# Patient Record
Sex: MALE | Race: WHITE | NOT HISPANIC OR LATINO | Employment: OTHER | URBAN - METROPOLITAN AREA
[De-identification: names, ages, dates, MRNs, and addresses within clinical notes are randomized per-mention and may not be internally consistent; named-entity substitution may affect disease eponyms.]

---

## 2017-10-11 ENCOUNTER — HOSPITAL ENCOUNTER (OUTPATIENT)
Dept: LAB | Facility: HOSPITAL | Age: 82
Discharge: HOME | End: 2017-10-11
Attending: INTERNAL MEDICINE | Admitting: INTERNAL MEDICINE

## 2017-10-11 LAB
ALBUMIN SERPL-MCNC: 3.4 G/DL (ref 3.5–4.8)
ALBUMIN/GLOB SERPL: 1.2 {RATIO} (ref 1–1.7)
ALP SERPL-CCNC: 46 IU/L (ref 32–91)
ALT SERPL-CCNC: 19 IU/L (ref 17–63)
ANION GAP SERPL CALC-SCNC: 10.3 MMOL/L (ref 10–20)
AST SERPL-CCNC: 23 IU/L (ref 15–41)
BASOPHILS # BLD AUTO: 0 10*3/UL (ref 0–0.2)
BASOPHILS NFR BLD AUTO: 1 % (ref 0–2)
BILIRUB SERPL-MCNC: 1 MG/DL (ref 0.3–1.2)
BUN SERPL-MCNC: 16 MG/DL (ref 8–20)
BUN/CREAT SERPL: 17.8 (ref 6.2–20.3)
CALCIUM SERPL-MCNC: 9 MG/DL (ref 8.9–10.3)
CHLORIDE SERPL-SCNC: 109 MMOL/L (ref 101–111)
CHOLEST SERPL-MCNC: 158 MG/DL
CHOLEST/HDLC SERPL: 5.1 {RATIO}
CONV CO2: 23 MMOL/L (ref 22–32)
CONV LDL CHOLESTEROL DIRECT: 104 MG/DL (ref 0–100)
CONV TOTAL PROTEIN: 6.3 G/DL (ref 6.1–7.9)
CREAT UR-MCNC: 0.9 MG/DL (ref 0.7–1.2)
DIFFERENTIAL METHOD BLD: (no result)
EOSINOPHIL # BLD AUTO: 0.1 10*3/UL (ref 0–0.3)
EOSINOPHIL # BLD AUTO: 2 % (ref 0–3)
ERYTHROCYTE [DISTWIDTH] IN BLOOD BY AUTOMATED COUNT: 14.4 % (ref 11.5–14.5)
FERRITIN SERPL-MCNC: 614 NG/ML (ref 24–336)
GLOBULIN UR ELPH-MCNC: 2.9 G/DL (ref 2.5–3.8)
GLUCOSE SERPL-MCNC: 109 MG/DL (ref 65–99)
HCT VFR BLD AUTO: 46.1 % (ref 40–54)
HDLC SERPL-MCNC: 31 MG/DL
HGB BLD-MCNC: 15.7 G/DL (ref 14–18)
IRON SERPL-MCNC: 122 UG/DL (ref 45–182)
LDLC/HDLC SERPL: 3.4 {RATIO}
LIPID INTERPRETATION: ABNORMAL
LYMPHOCYTES # BLD AUTO: 1.6 10*3/UL (ref 0.8–4.8)
LYMPHOCYTES NFR BLD AUTO: 29 % (ref 18–42)
MCH RBC QN AUTO: 34 PG (ref 26–32)
MCHC RBC AUTO-ENTMCNC: 34.1 G/DL (ref 32–36)
MCV RBC AUTO: 99.7 FL (ref 80–94)
MONOCYTES # BLD AUTO: 0.7 10*3/UL (ref 0.1–1.3)
MONOCYTES NFR BLD AUTO: 13 % (ref 2–11)
NEUTROPHILS # BLD AUTO: 3.1 10*3/UL (ref 2.3–8.6)
NEUTROPHILS NFR BLD AUTO: 55 % (ref 50–75)
NRBC BLD AUTO-RTO: 0 /100{WBCS}
NRBC/RBC NFR BLD MANUAL: 0 10*3/UL
PLATELET # BLD AUTO: 155 10*3/UL (ref 150–450)
PMV BLD AUTO: 8.9 FL (ref 7.4–10.4)
POTASSIUM SERPL-SCNC: 4.3 MMOL/L (ref 3.6–5.1)
RBC # BLD AUTO: 4.63 10*6/UL (ref 4.6–6)
SODIUM SERPL-SCNC: 138 MMOL/L (ref 136–144)
TRIGL SERPL-MCNC: 107 MG/DL
VIT B12 SERPL-MCNC: 689 PG/ML (ref 180–914)
VLDLC SERPL CALC-MCNC: 23 MG/DL
WBC # BLD AUTO: 5.6 10*3/UL (ref 4.5–11.5)

## 2018-10-01 ENCOUNTER — HOSPITAL ENCOUNTER (OUTPATIENT)
Dept: LAB | Facility: HOSPITAL | Age: 83
Setting detail: SPECIMEN
Discharge: HOME | End: 2018-10-01
Attending: INTERNAL MEDICINE | Admitting: INTERNAL MEDICINE

## 2018-10-01 LAB
BASOPHILS # BLD AUTO: 0 10*3/UL (ref 0–0.2)
BASOPHILS NFR BLD AUTO: 1 % (ref 0–2)
DIFFERENTIAL METHOD BLD: (no result)
EOSINOPHIL # BLD AUTO: 0.1 10*3/UL (ref 0–0.3)
EOSINOPHIL # BLD AUTO: 1 % (ref 0–3)
ERYTHROCYTE [DISTWIDTH] IN BLOOD BY AUTOMATED COUNT: 14.5 % (ref 11.5–14.5)
HCT VFR BLD AUTO: 42.6 % (ref 40–54)
HGB BLD-MCNC: 14.4 G/DL (ref 14–18)
IRON SATN MFR SERPL: 51 % (ref 20–50)
IRON SERPL-MCNC: 145 UG/DL (ref 45–182)
LYMPHOCYTES # BLD AUTO: 1.5 10*3/UL (ref 0.8–4.8)
LYMPHOCYTES NFR BLD AUTO: 31 % (ref 18–42)
MCH RBC QN AUTO: 33.5 PG (ref 26–32)
MCHC RBC AUTO-ENTMCNC: 33.9 G/DL (ref 32–36)
MCV RBC AUTO: 99 FL (ref 80–94)
MONOCYTES # BLD AUTO: 0.5 10*3/UL (ref 0.1–1.3)
MONOCYTES NFR BLD AUTO: 11 % (ref 2–11)
NEUTROPHILS # BLD AUTO: 2.8 10*3/UL (ref 2.3–8.6)
NEUTROPHILS NFR BLD AUTO: 56 % (ref 50–75)
NRBC BLD AUTO-RTO: 0 /100{WBCS}
NRBC/RBC NFR BLD MANUAL: 0 10*3/UL
PLATELET # BLD AUTO: 163 10*3/UL (ref 150–450)
PMV BLD AUTO: 9 FL (ref 7.4–10.4)
RBC # BLD AUTO: 4.3 10*6/UL (ref 4.6–6)
TIBC SERPL-MCNC: 285 UG/DL (ref 228–428)
WBC # BLD AUTO: 4.9 10*3/UL (ref 4.5–11.5)

## 2019-07-23 ENCOUNTER — TELEPHONE (OUTPATIENT)
Dept: CARDIOLOGY | Facility: CLINIC | Age: 84
End: 2019-07-23

## 2019-07-23 ENCOUNTER — LAB (OUTPATIENT)
Dept: LAB | Facility: HOSPITAL | Age: 84
End: 2019-07-23

## 2019-07-23 DIAGNOSIS — I25.118 CORONARY ARTERY DISEASE OF NATIVE HEART WITH STABLE ANGINA PECTORIS, UNSPECIFIED VESSEL OR LESION TYPE (HCC): ICD-10-CM

## 2019-07-23 DIAGNOSIS — E78.5 HYPERLIPIDEMIA LDL GOAL <100: ICD-10-CM

## 2019-07-23 DIAGNOSIS — I10 ESSENTIAL HYPERTENSION: Primary | ICD-10-CM

## 2019-07-23 DIAGNOSIS — I10 ESSENTIAL HYPERTENSION: ICD-10-CM

## 2019-07-23 LAB
ALBUMIN SERPL-MCNC: 3.3 G/DL (ref 3.5–4.8)
ALBUMIN/GLOB SERPL: 1.3 G/DL (ref 1–1.7)
ALP SERPL-CCNC: 53 U/L (ref 32–91)
ALT SERPL W P-5'-P-CCNC: 14 U/L (ref 17–63)
ANION GAP SERPL CALCULATED.3IONS-SCNC: 12.5 MMOL/L (ref 5–15)
ARTICHOKE IGE QN: 120 MG/DL (ref 0–100)
AST SERPL-CCNC: 22 U/L (ref 15–41)
BASOPHILS # BLD AUTO: 0 10*3/MM3 (ref 0–0.2)
BASOPHILS NFR BLD AUTO: 0.5 % (ref 0–1.5)
BILIRUB SERPL-MCNC: 1.1 MG/DL (ref 0.3–1.2)
BUN BLD-MCNC: 15 MG/DL (ref 8–20)
BUN/CREAT SERPL: 18.8 (ref 6.2–20.3)
CALCIUM SPEC-SCNC: 8.8 MG/DL (ref 8.9–10.3)
CHLORIDE SERPL-SCNC: 107 MMOL/L (ref 101–111)
CHOLEST SERPL-MCNC: 167 MG/DL
CO2 SERPL-SCNC: 23 MMOL/L (ref 22–32)
CREAT BLD-MCNC: 0.8 MG/DL (ref 0.7–1.2)
DEPRECATED RDW RBC AUTO: 51.2 FL (ref 37–54)
EOSINOPHIL # BLD AUTO: 0.2 10*3/MM3 (ref 0–0.4)
EOSINOPHIL NFR BLD AUTO: 3 % (ref 0.3–6.2)
ERYTHROCYTE [DISTWIDTH] IN BLOOD BY AUTOMATED COUNT: 14.9 % (ref 12.3–15.4)
GFR SERPL CREATININE-BSD FRML MDRD: 93 ML/MIN/1.73
GLOBULIN UR ELPH-MCNC: 2.6 GM/DL (ref 2.5–3.8)
GLUCOSE BLD-MCNC: 106 MG/DL (ref 65–99)
HCT VFR BLD AUTO: 45.4 % (ref 37.5–51)
HDLC SERPL QL: 5.39
HDLC SERPL-MCNC: 31 MG/DL
HGB BLD-MCNC: 14.9 G/DL (ref 13–17.7)
LDLC/HDLC SERPL: 3.65 {RATIO}
LYMPHOCYTES # BLD AUTO: 1.6 10*3/MM3 (ref 0.7–3.1)
LYMPHOCYTES NFR BLD AUTO: 30.6 % (ref 19.6–45.3)
MCH RBC QN AUTO: 32.6 PG (ref 26.6–33)
MCHC RBC AUTO-ENTMCNC: 32.8 G/DL (ref 31.5–35.7)
MCV RBC AUTO: 99.4 FL (ref 79–97)
MONOCYTES # BLD AUTO: 0.6 10*3/MM3 (ref 0.1–0.9)
MONOCYTES NFR BLD AUTO: 11.1 % (ref 5–12)
NEUTROPHILS # BLD AUTO: 2.8 10*3/MM3 (ref 1.7–7)
NEUTROPHILS NFR BLD AUTO: 54.8 % (ref 42.7–76)
NRBC BLD AUTO-RTO: 0 /100 WBC (ref 0–0.2)
PLATELET # BLD AUTO: 177 10*3/MM3 (ref 140–450)
PMV BLD AUTO: 8.5 FL (ref 6–12)
POTASSIUM BLD-SCNC: 4.5 MMOL/L (ref 3.6–5.1)
PROT SERPL-MCNC: 5.9 G/DL (ref 6.1–7.9)
RBC # BLD AUTO: 4.56 10*6/MM3 (ref 4.14–5.8)
SODIUM BLD-SCNC: 138 MMOL/L (ref 136–144)
TRIGL SERPL-MCNC: 115 MG/DL
VIT B12 BLD-MCNC: 787 PG/ML (ref 180–914)
VLDLC SERPL-MCNC: 23 MG/DL
WBC NRBC COR # BLD: 5.2 10*3/MM3 (ref 3.4–10.8)

## 2019-07-23 PROCEDURE — 82607 VITAMIN B-12: CPT

## 2019-07-23 PROCEDURE — 85025 COMPLETE CBC W/AUTO DIFF WBC: CPT

## 2019-07-23 PROCEDURE — 36415 COLL VENOUS BLD VENIPUNCTURE: CPT

## 2019-07-23 PROCEDURE — 80061 LIPID PANEL: CPT

## 2019-07-23 PROCEDURE — 80053 COMPREHEN METABOLIC PANEL: CPT

## 2019-07-23 NOTE — TELEPHONE ENCOUNTER
Pt called and wanted to see if lab orders were sent to [facility]. [facility] orders would be over for pt to have drawn.     Placed orders per Dr. Mao in centricity.

## 2019-07-24 RX ORDER — NIACIN 500 MG
TABLET ORAL
COMMUNITY
Start: 2014-08-06

## 2019-07-24 RX ORDER — ATENOLOL 25 MG/1
TABLET ORAL EVERY 24 HOURS
COMMUNITY
Start: 2018-01-15 | End: 2019-07-30 | Stop reason: SDUPTHER

## 2019-07-24 RX ORDER — CHOLECALCIFEROL (VITAMIN D3) 25 MCG
TABLET,CHEWABLE ORAL
COMMUNITY
Start: 2015-08-07

## 2019-07-24 RX ORDER — SIMVASTATIN 20 MG
TABLET ORAL EVERY 24 HOURS
COMMUNITY
Start: 2017-07-17 | End: 2019-07-30 | Stop reason: ALTCHOICE

## 2019-07-24 RX ORDER — MINOXIDIL 2 %
SOLUTION, NON-ORAL TOPICAL
COMMUNITY
Start: 2014-08-06

## 2019-07-30 ENCOUNTER — OFFICE VISIT (OUTPATIENT)
Dept: CARDIOLOGY | Facility: CLINIC | Age: 84
End: 2019-07-30

## 2019-07-30 VITALS
DIASTOLIC BLOOD PRESSURE: 79 MMHG | OXYGEN SATURATION: 94 % | WEIGHT: 243 LBS | HEIGHT: 67 IN | SYSTOLIC BLOOD PRESSURE: 131 MMHG | BODY MASS INDEX: 38.14 KG/M2 | HEART RATE: 43 BPM

## 2019-07-30 DIAGNOSIS — I25.10 CORONARY ARTERY DISEASE INVOLVING NATIVE CORONARY ARTERY OF NATIVE HEART WITHOUT ANGINA PECTORIS: Primary | ICD-10-CM

## 2019-07-30 DIAGNOSIS — E78.5 DYSLIPIDEMIA: ICD-10-CM

## 2019-07-30 PROCEDURE — 99214 OFFICE O/P EST MOD 30 MIN: CPT | Performed by: INTERNAL MEDICINE

## 2019-07-30 PROCEDURE — 93000 ELECTROCARDIOGRAM COMPLETE: CPT | Performed by: INTERNAL MEDICINE

## 2019-07-30 RX ORDER — SIMVASTATIN 40 MG
40 TABLET ORAL DAILY
Qty: 90 TABLET | Refills: 3 | Status: SHIPPED | OUTPATIENT
Start: 2019-07-30 | End: 2020-08-03 | Stop reason: SDUPTHER

## 2019-07-30 RX ORDER — ATENOLOL 25 MG/1
25 TABLET ORAL DAILY
Qty: 90 TABLET | Refills: 3 | Status: SHIPPED | OUTPATIENT
Start: 2019-07-30 | End: 2020-08-03 | Stop reason: SDUPTHER

## 2019-07-30 NOTE — PROGRESS NOTES
Subjective:     Encounter Date:07/30/2019      Patient ID: Royal Beckman is a 83 y.o. male.    Chief Complaint:  1 yr follow up.  CAD with NSTEMI and CABG 2001. Obesity, dyslipidemia, hyperglycemia.   Anemia and thrombocytopenia and iron deficiency and B12 deficiency blood work 2011.     No SOB or chest or arm discomfort with present activity.  No orthopnea . Ankle swelling.  No syncope or near syncope  No sleep disturbance and no past sleep study.  No transient focal neurologic loss.  No discomfort in legs with walking.     PMH:  CAD.     NSTEMI and subsequent CABG in 2001 with LIMA to LAD , SVG to 1st diagonal branch LAD , SVG to marginal branch of Cx , and SVG to  PDA.     At the time of his MI he had discomfort in his left arm.     Hyperlipidemia     ALT 19 Chol  158  HDL 31  10/11/2017  ALT 14 chol 167  HDL 31 /23/2019     Hyperglycemia  8/2/2016, 109 10/11/2017, 106 7/23/2090     Obesity          Streptococcal cellulitis of the lower extremities following CABG.          Anemia  July 2011 Hgb  at that time 11.5 and  B12 169, ferritin 7, iron 21, , saturation 4%. Hgb 15.2 8/4/15, 14.9 8/2/2016. Colonoscopy in 2011 at [facility]. Started on iron sulfate 325 mg qd  in Dec 2013.     Platelets [id number] 8/4/2015, [id number] 8/2/2016     B12 689 10/11/2017, 787 7/23/2019     WBC 4900 Hgb 14.4 MCV 99 iron 145  51% 10/01/2018     WBC 5200 Hgb 14.9 MCV 99.4 platelets [id number]/23/2019       Past cigarette use          Labs      Creatinine 0.9 K 4.3 protein 6.3 albumin 3.4 10/11/2017  Creatinine 0.8 K 4.5 7/23/2019                 ECG 12 Lead  Date/Time: 7/30/2019 11:00 AM  Performed by: Sonny Mao MD  Authorized by: Sonny Mao MD   Previous ECG: no previous ECG available  Comments:   Sinus bradycardia  ST and T wave abnormality  Questionably prolonged QT interval and/or prominent U wave  Abnormal EKG  No previous EKG available for comparison               Objective:      Physical Exam   Constitutional:   Weight 243 pounds with 1 pound loss in the past year and 21 pound loss in the past 2 years  /80 RA= LA lying and standing  HR 50 regular rhythm  O2 sat 94%   Neck: No thyromegaly present.   Cardiovascular:   No carotid bruits  No JVD  No palpable precordial impulses  No murmur, gallop, rub     Pulmonary/Chest:   Unremarkable to percussion and auscultation  No wheezing, rhonchi, rales   Abdominal:   Obese  Liver and spleen not palpable   Musculoskeletal:   Mild lower extremity edema  Right and left dorsalis pedis pulses +2 and posterior tibial pulses not palpable       Lab Review:      7/23/2019  CBC, B12, CMP, lipid panel 7/23/2019 reviewed and pertinent findings recorded PMH  Assessment:       No angina pectoris or SOB with present activity  BP satisfactory  Mild  lower extremity edema   Obese weight  but 21 lb loss in the past 2 years  BS satisfactory  LDL cholesterol still elevated  Creatinine, K, LFT satisfactory  Hemoglobin and platelet count satisfactory       Plan:     Continued weight reduction and 3 g sodium restriction with handout provided and regular mild make exercise 30 minutes 5 days a week.  Increase simvastatin from 20 mg a day to 40 mg a day  Return  appointment 1 year with EKG and CMP, lipid panel, CBC

## 2019-10-15 RX ORDER — SIMVASTATIN 20 MG
TABLET ORAL
Qty: 90 TABLET | Refills: 3 | Status: SHIPPED | OUTPATIENT
Start: 2019-10-15 | End: 2020-08-03 | Stop reason: SDUPTHER

## 2020-08-03 ENCOUNTER — OFFICE VISIT (OUTPATIENT)
Dept: CARDIOLOGY | Facility: CLINIC | Age: 85
End: 2020-08-03

## 2020-08-03 VITALS
OXYGEN SATURATION: 96 % | HEART RATE: 39 BPM | BODY MASS INDEX: 36.49 KG/M2 | DIASTOLIC BLOOD PRESSURE: 68 MMHG | WEIGHT: 219 LBS | HEIGHT: 65 IN | SYSTOLIC BLOOD PRESSURE: 124 MMHG

## 2020-08-03 DIAGNOSIS — I10 ESSENTIAL HYPERTENSION: ICD-10-CM

## 2020-08-03 DIAGNOSIS — I25.810 CORONARY ARTERY DISEASE INVOLVING CORONARY BYPASS GRAFT OF NATIVE HEART WITHOUT ANGINA PECTORIS: ICD-10-CM

## 2020-08-03 DIAGNOSIS — R00.1 BRADYCARDIA: ICD-10-CM

## 2020-08-03 DIAGNOSIS — E78.5 DYSLIPIDEMIA: Primary | ICD-10-CM

## 2020-08-03 DIAGNOSIS — I49.3 PVC (PREMATURE VENTRICULAR CONTRACTION): ICD-10-CM

## 2020-08-03 PROCEDURE — 99214 OFFICE O/P EST MOD 30 MIN: CPT | Performed by: INTERNAL MEDICINE

## 2020-08-03 PROCEDURE — 93000 ELECTROCARDIOGRAM COMPLETE: CPT | Performed by: INTERNAL MEDICINE

## 2020-08-03 RX ORDER — SIMVASTATIN 20 MG
20 TABLET ORAL DAILY
Qty: 90 TABLET | Refills: 3 | Status: SHIPPED | OUTPATIENT
Start: 2020-08-03 | End: 2021-10-04

## 2020-08-03 RX ORDER — ATENOLOL 25 MG/1
25 TABLET ORAL DAILY
Qty: 90 TABLET | Refills: 3 | Status: SHIPPED | OUTPATIENT
Start: 2020-08-03 | End: 2021-08-05 | Stop reason: SDUPTHER

## 2020-08-03 NOTE — PROGRESS NOTES
Subjective:     Encounter Date:08/03/2020      Patient ID: Royal Beckman is a 84 y.o. male.    Chief Complaint : To establish heart doctor history of CAD  History of Present Illness        This is an 83-year-old with PMH of    -CAD,NSTEMI, CABG 2001 with LIMA to LAD, SVG to D1, SVG to OM, SVG to PDA  -Bradycardia  -Dyslipidemia  -Hyperglycemia, obesity  -Anemia, thrombocytopenia  -Strep cellulitis of lower extremities following CABG  -Former smoker quit 1977    Here to establish cardiac care.  Patient denies any chest pain shortness of breath or palpitations.  Review of labs from 7/23/2019 reveal cholesterol 167 triglycerides 115 HDL low at 31 .  CMP with low albumin at 3.3.  Patient's heart rate is low and has PVCs but he states that these are normal for him.  Patient did not have symptoms of chest pain before his car CABG.    Assessment :  -PVCs  -Bradycardia  -CAD, CABG  -Dyslipidemia  -Hypertension    Plan :    Reviewed EKG results with patient  We will check CMP to look at patient's electrolytes causing PVCs.  Will check lipid profile and CMP and CK  Continue aspirin atenolol and simvastatin.  Risk benefits alternatives explained.  Counseled on diet exercise.  Reviewed CAD PVCs bradycardia symptoms to monitor with patient.  We will follow-up in 1 year or sooner if needed.      Assessment:          Diagnosis Plan   1. Dyslipidemia  Comprehensive Metabolic Panel    CK    Lipid Panel   2. Bradycardia  Comprehensive Metabolic Panel    [facility]    Lipid Panel   3. Coronary artery disease involving coronary bypass graft of native heart without angina pectoris  Comprehensive Metabolic Panel    CK    Lipid Panel   4. PVC (premature ventricular contraction)  Comprehensive Metabolic Panel    CK    Lipid Panel   5. Essential hypertension  Comprehensive Metabolic Panel    CK    Lipid Panel          Plan:         Past Medical History:  Past Medical History:   Diagnosis Date   • Anemia    • B12 deficiency    •  Coronary artery disease    • Hyperglycemia    • Hyperlipidemia    • Myocardial infarction (CMS/HCC)      Past Surgical History:  Past Surgical History:   Procedure Laterality Date   • CARDIAC CATHETERIZATION     • COLONOSCOPY     • CORONARY ARTERY BYPASS GRAFT        Allergies:  No Known Allergies  Home Meds:  Current Meds:     Current Outpatient Medications:   •  aspirin 81 MG tablet, ASPIRIN 81 MG ORAL TABLET, Disp: , Rfl:   •  atenolol (TENORMIN) 25 MG tablet, Take 1 tablet by mouth Daily., Disp: 90 tablet, Rfl: 3  •  Cyanocobalamin (B-12) 1000 MCG capsule, B-12 1000 MCG CAPS, Disp: , Rfl:   •  niacin 500 MG tablet, NIACIN 500 MG TABS, Disp: , Rfl:   •  Omega-3 Fatty Acids ([employer] FISH OIL) 1200 MG capsule, [employer]S FISH OIL 1200 MG CAPS, Disp: , Rfl:   •  simvastatin (ZOCOR) 20 MG tablet, Take 1 tablet by mouth Daily., Disp: 90 tablet, Rfl: 3  Social History:   Social History     Tobacco Use   • Smoking status: Former Smoker     Last attempt to quit: 1977     Years since quittin.6   • Smokeless tobacco: Never Used   Substance Use Topics   • Alcohol use: Yes     Comment: occasionally      Family History:  Family History   Problem Relation Age of Onset   • Aneurysm Father    • Heart disease Brother         The following portions of the patient's history were reviewed and updated as appropriate: allergies, current medications, past family history, past medical history, past social history, past surgical history and problem list.      Review of Systems   Constitution: Negative for fever and malaise/fatigue.   HENT: Negative for congestion and hearing loss.    Eyes: Negative for double vision and visual disturbance.   Cardiovascular: Negative for chest pain, claudication, dyspnea on exertion, leg swelling and syncope.   Respiratory: Negative for cough and shortness of breath.    Endocrine: Negative for cold intolerance.   Skin: Negative for color change and rash.   Musculoskeletal: Negative for arthritis and joint pain.  "  Gastrointestinal: Negative for abdominal pain and heartburn.   Genitourinary: Negative for hematuria.   Neurological: Negative for excessive daytime sleepiness and dizziness.   Psychiatric/Behavioral: Negative for depression. The patient is not nervous/anxious.    All other systems reviewed and are negative.    Comprehensive review of systems were reviewed and all others review of systems were found to be negative other than HPI      ECG 12 Lead  Date/Time: 8/3/2020 5:03 PM  Performed by: Sanjiv Bae MD  Authorized by: Sanjiv Bae MD   Comparison: compared with previous ECG from 7/30/2019  Comparison to previous ECG: EKG done today reviewed by me shows marked sinus bradycardia at the rate of 41 bpm with PVCs, compared to EKG from 7/30/2019 patient did not have PVCs but had bradycardia.                 Objective:     Physical Exam  /68   Pulse (!) 39   Ht 165.1 cm (65\")   Wt 99.3 kg (219 lb)   SpO2 96%   BMI 36.44 kg/m²   General:  Appears in no acute distress  Eyes: Sclera is anicteric,  conjunctiva is clear   HEENT:  No JVD. Thyroid not visibly enlarged. No mucosal pallor or cyanosis  Respiratory: Respirations regular and unlabored at rest.  Bilaterally good breath sounds, with good air entry in all fields. No crackles, rubs or wheezes auscultated  Cardiovascular: S1,S2 Regular rate and rhythm. No murmur, rub or gallop auscultated. No pretibial pitting edema  Gastrointestinal: Abdomen soft, flat, non tender. Bowel sounds present.   Musculoskeletal:  No abnormal movements  Extremities: No digital clubbing or cyanosis  Skin: Color pink. Skin warm and dry to touch. No rashes  No xanthoma  Neuro: Alert and awake, no lateralizing deficits appreciated    Lab Reviewed:                  "

## 2021-02-15 PROBLEM — R60.0 LEG EDEMA: Status: ACTIVE | Noted: 2018-11-13

## 2021-07-06 PROCEDURE — 87147 CULTURE TYPE IMMUNOLOGIC: CPT | Performed by: FAMILY MEDICINE

## 2021-07-06 PROCEDURE — 87186 SC STD MICRODIL/AGAR DIL: CPT | Performed by: FAMILY MEDICINE

## 2021-07-06 PROCEDURE — 87070 CULTURE OTHR SPECIMN AEROBIC: CPT | Performed by: FAMILY MEDICINE

## 2021-07-06 PROCEDURE — 87205 SMEAR GRAM STAIN: CPT | Performed by: FAMILY MEDICINE

## 2021-07-27 ENCOUNTER — LAB (OUTPATIENT)
Dept: LAB | Facility: HOSPITAL | Age: 86
End: 2021-07-27

## 2021-07-27 DIAGNOSIS — I10 ESSENTIAL HYPERTENSION: ICD-10-CM

## 2021-07-27 DIAGNOSIS — I49.3 PVC (PREMATURE VENTRICULAR CONTRACTION): ICD-10-CM

## 2021-07-27 DIAGNOSIS — R00.1 BRADYCARDIA: ICD-10-CM

## 2021-07-27 DIAGNOSIS — E78.5 DYSLIPIDEMIA: ICD-10-CM

## 2021-07-27 DIAGNOSIS — I25.810 CORONARY ARTERY DISEASE INVOLVING CORONARY BYPASS GRAFT OF NATIVE HEART WITHOUT ANGINA PECTORIS: ICD-10-CM

## 2021-07-27 LAB
ALBUMIN SERPL-MCNC: 3.9 G/DL (ref 3.5–5.2)
ALBUMIN/GLOB SERPL: 1.5 G/DL
ALP SERPL-CCNC: 60 U/L (ref 39–117)
ALT SERPL W P-5'-P-CCNC: 10 U/L (ref 1–41)
ANION GAP SERPL CALCULATED.3IONS-SCNC: 7.6 MMOL/L (ref 5–15)
AST SERPL-CCNC: 19 U/L (ref 1–40)
BILIRUB SERPL-MCNC: 0.5 MG/DL (ref 0–1.2)
BUN SERPL-MCNC: 14 MG/DL (ref 8–23)
BUN/CREAT SERPL: 15.2 (ref 7–25)
CALCIUM SPEC-SCNC: 9.1 MG/DL (ref 8.6–10.5)
CHLORIDE SERPL-SCNC: 106 MMOL/L (ref 98–107)
CHOLEST SERPL-MCNC: 153 MG/DL (ref 0–200)
CK SERPL-CCNC: 55 U/L (ref 20–200)
CO2 SERPL-SCNC: 24.4 MMOL/L (ref 22–29)
CREAT SERPL-MCNC: 0.92 MG/DL (ref 0.76–1.27)
GFR SERPL CREATININE-BSD FRML MDRD: 79 ML/MIN/1.73
GLOBULIN UR ELPH-MCNC: 2.6 GM/DL
GLUCOSE SERPL-MCNC: 100 MG/DL (ref 65–99)
HDLC SERPL-MCNC: 40 MG/DL (ref 40–60)
LDLC SERPL CALC-MCNC: 97 MG/DL (ref 0–100)
LDLC/HDLC SERPL: 2.41 {RATIO}
POTASSIUM SERPL-SCNC: 4.6 MMOL/L (ref 3.5–5.2)
PROT SERPL-MCNC: 6.5 G/DL (ref 6–8.5)
SODIUM SERPL-SCNC: 138 MMOL/L (ref 136–145)
TRIGL SERPL-MCNC: 84 MG/DL (ref 0–150)
VLDLC SERPL-MCNC: 16 MG/DL (ref 5–40)

## 2021-07-27 PROCEDURE — 80061 LIPID PANEL: CPT

## 2021-07-27 PROCEDURE — 82550 ASSAY OF CK (CPK): CPT

## 2021-07-27 PROCEDURE — 36415 COLL VENOUS BLD VENIPUNCTURE: CPT

## 2021-07-27 PROCEDURE — 80053 COMPREHEN METABOLIC PANEL: CPT

## 2021-08-05 ENCOUNTER — OFFICE VISIT (OUTPATIENT)
Dept: CARDIOLOGY | Facility: CLINIC | Age: 86
End: 2021-08-05

## 2021-08-05 VITALS
HEIGHT: 67 IN | HEART RATE: 43 BPM | WEIGHT: 219 LBS | OXYGEN SATURATION: 98 % | BODY MASS INDEX: 34.37 KG/M2 | DIASTOLIC BLOOD PRESSURE: 79 MMHG | SYSTOLIC BLOOD PRESSURE: 152 MMHG

## 2021-08-05 DIAGNOSIS — I25.810 CORONARY ARTERY DISEASE INVOLVING CORONARY BYPASS GRAFT OF NATIVE HEART WITHOUT ANGINA PECTORIS: ICD-10-CM

## 2021-08-05 DIAGNOSIS — E66.9 OBESITY (BMI 30-39.9): ICD-10-CM

## 2021-08-05 DIAGNOSIS — R00.1 BRADYCARDIA: Primary | ICD-10-CM

## 2021-08-05 DIAGNOSIS — I49.3 PVC (PREMATURE VENTRICULAR CONTRACTION): ICD-10-CM

## 2021-08-05 DIAGNOSIS — I10 ESSENTIAL HYPERTENSION: ICD-10-CM

## 2021-08-05 DIAGNOSIS — E78.5 DYSLIPIDEMIA: ICD-10-CM

## 2021-08-05 PROCEDURE — 93000 ELECTROCARDIOGRAM COMPLETE: CPT | Performed by: INTERNAL MEDICINE

## 2021-08-05 PROCEDURE — 99214 OFFICE O/P EST MOD 30 MIN: CPT | Performed by: INTERNAL MEDICINE

## 2021-08-05 RX ORDER — ATENOLOL 25 MG/1
12.5 TABLET ORAL DAILY
Qty: 90 TABLET | Refills: 3 | Status: SHIPPED | OUTPATIENT
Start: 2021-08-05 | End: 2022-08-22

## 2021-08-05 RX ORDER — AMLODIPINE BESYLATE 10 MG/1
10 TABLET ORAL DAILY
Qty: 90 TABLET | Refills: 3 | Status: SHIPPED | OUTPATIENT
Start: 2021-08-05 | End: 2022-07-25

## 2021-08-05 NOTE — PROGRESS NOTES
Subjective:     Encounter Date:08/05/2021      Patient ID: Royal Beckman is a 85 y.o. male.    Chief Complaint : Bradycardia, CAD, MI, CABG, dyslipidemia  History of Present Illness        This is an 85-year-old with PMH of    -CAD,NSTEMI, CABG 2001 with LIMA to LAD, SVG to D1, SVG to OM, SVG to PDA  -Bradycardia  -Dyslipidemia  -Hyperglycemia, obesity  -Anemia, thrombocytopenia  -Strep cellulitis of lower extremities following CABG  -Former smoker quit 1977    Here for follow-up.  Patient denies any chest pain lightheadedness dizziness loss of consciousness.  Patient had labs from 7/23/2019 reveal cholesterol 167 triglycerides 115 HDL low at 31 .  CMP with low albumin at 3.3.  Labs from 7/27/2021 reveal normal CK and CMP, glucose 100, cholesterol 153, triglycerides 84, HDL 40, LDL 97.  Patient's heart rate is low and has PVCs but he states that these are normal for him all his life.  Patient did not have symptoms of chest pain before his   CABG.  Patient's ideal blood pressure is 152/79, heart rate 43, O2 sat of 98% on room air.    Assessment :  -PVCs  -Bradycardia  -CAD, CABG  -Dyslipidemia  -Hypertension    Plan :    Reviewed EKG results with patient  We will check CMP to look at patient's electrolytes causing PVCs.  Will check lipid profile and CMP and CK  Continue aspirin atenolol and simvastatin to help with PVCs, CAD, CABG, hypertension, dyslipidemia as tolerated.    Reviewed BMI over 34 counseled on weight loss diet and exercise  We will follow-up in 1 year or sooner if needed.  Discussed about Covid vaccination.  Patient's blood pressure is elevated at 152.  Advised him to check blood pressure at home.            ECG 12 Lead    Date/Time: 8/5/2021 9:24 PM  Performed by: Sanjiv Bae MD  Authorized by: Sanjiv Bae MD   Comparison: compared with previous ECG from 8/3/2020  Comparison to previous ECG: EKG done today reviewed/interpreted by me reveals sinus bradycardia at  the rate of 40 bpm with baseline artifact and moderate intraventricular conduction delay and nonspecific ST-T changes, no significant change compared to EKG from 8/3/2020.              The following portions of the patient's history were reviewed and updated as appropriate: allergies, current medications, past family history, past medical history, past social history, past surgical history and problem list.    Assessment:         [facility]     Diagnosis Plan   1. Bradycardia  Comprehensive Metabolic Panel    Lipid Panel    TSH   2. Essential hypertension  Comprehensive Metabolic Panel    Lipid Panel    TSH   3. Coronary artery disease involving coronary bypass graft of native heart without angina pectoris  Comprehensive Metabolic Panel    Lipid Panel    TSH   4. Dyslipidemia  Comprehensive Metabolic Panel    Lipid Panel    TSH   5. PVC (premature ventricular contraction)  Comprehensive Metabolic Panel    Lipid Panel    TSH   6. Obesity (BMI 30-39.9)  Comprehensive Metabolic Panel    Lipid Panel    TSH          Plan:               Past Medical History:  Past Medical History:   Diagnosis Date   • Anemia    • B12 deficiency    • Coronary artery disease    • Hyperglycemia    • Hyperlipidemia    • Myocardial infarction (CMS/HCC)      Past Surgical History:  Past Surgical History:   Procedure Laterality Date   • CARDIAC CATHETERIZATION     • COLONOSCOPY     • CORONARY ARTERY BYPASS GRAFT        Allergies:  No Known Allergies  Home Meds:  Current Meds:     Current Outpatient Medications:   •  aspirin 81 MG tablet, ASPIRIN 81 MG ORAL TABLET, Disp: , Rfl:   •  atenolol (TENORMIN) 25 MG tablet, Take 0.5 tablets by mouth Daily., Disp: 90 tablet, Rfl: 3  •  Cyanocobalamin (B-12) 1000 MCG capsule, B-12 1000 MCG CAPS, Disp: , Rfl:   •  niacin 500 MG tablet, NIACIN 500 MG TABS, Disp: , Rfl:   •  Omega-3 Fatty Acids ([employer] FISH OIL) 1200 MG capsule, [employer] FISH OIL 1200 MG CAPS, Disp: , Rfl:   •  simvastatin (ZOCOR) 20 MG tablet, Take 1  "tablet by mouth Daily., Disp: 90 tablet, Rfl: 3  •  amLODIPine (NORVASC) 10 MG tablet, Take 1 tablet by mouth Daily., Disp: 90 tablet, Rfl: 3  Social History:   Social History     Tobacco Use   • Smoking status: Former Smoker     Quit date:      Years since quittin.6   • Smokeless tobacco: Never Used   Substance Use Topics   • Alcohol use: Yes     Comment: occasionally      Family History:  Family History   Problem Relation Age of Onset   • Aneurysm Father    • Heart disease Brother               Review of Systems   Cardiovascular: Negative for chest pain, leg swelling and palpitations.   Respiratory: Negative for shortness of breath.    Neurological: Negative for dizziness and numbness.     All other systems are negative         Objective:     Physical Exam  /79 (BP Location: Left arm, Patient Position: Sitting, Cuff Size: Large Adult)   Pulse (!) 43   Ht 170.2 cm (67\")   Wt 99.3 kg (219 lb)   SpO2 98%   BMI 34.30 kg/m²   General:  Appears in no acute distress  Eyes: Sclera is anicteric,  conjunctiva is clear   HEENT:  No JVD.  No carotid bruits  Respiratory: Respirations regular and unlabored at rest.  Clear to auscultation  Cardiovascular: S1,S2 Regular rate and rhythm. No murmur, rub or gallop auscultated.   Gastrointestinal: Abdomen nondistended, soft  Extremities: No digital clubbing or cyanosis, no edema  Skin: Color pink. Skin warm and dry to touch. No rashes  No xanthoma  Neuro: Alert and awake, no lateralizing deficits appreciated    Lab Reviewed:                  "

## 2021-10-04 RX ORDER — SIMVASTATIN 20 MG
TABLET ORAL
Qty: 90 TABLET | Refills: 3 | Status: SHIPPED | OUTPATIENT
Start: 2021-10-04 | End: 2022-09-26

## 2021-10-04 NOTE — TELEPHONE ENCOUNTER
Rx Refill Note  Requested Prescriptions     Pending Prescriptions Disp Refills   • simvastatin (ZOCOR) 20 MG tablet [Pharmacy Med Name: SIMVASTATIN 20MG] 90 tablet 2     Sig: TAKE ONE TABLET BY MOUTH EVERY DAY      Last office visit with prescribing clinician: 8/5/2021      Next office visit with prescribing clinician: 3/8/2022     Lipid Panel (07/27/2021 11:40)  Comprehensive Metabolic Panel (07/27/2021 11:40)         Ashli Ortiz MA  10/04/21, 10:09 EDT

## 2022-03-01 ENCOUNTER — LAB (OUTPATIENT)
Dept: LAB | Facility: HOSPITAL | Age: 87
End: 2022-03-01

## 2022-03-01 DIAGNOSIS — I49.3 PVC (PREMATURE VENTRICULAR CONTRACTION): ICD-10-CM

## 2022-03-01 DIAGNOSIS — I10 ESSENTIAL HYPERTENSION: ICD-10-CM

## 2022-03-01 DIAGNOSIS — E78.5 DYSLIPIDEMIA: ICD-10-CM

## 2022-03-01 DIAGNOSIS — E66.9 OBESITY (BMI 30-39.9): ICD-10-CM

## 2022-03-01 DIAGNOSIS — I25.810 CORONARY ARTERY DISEASE INVOLVING CORONARY BYPASS GRAFT OF NATIVE HEART WITHOUT ANGINA PECTORIS: ICD-10-CM

## 2022-03-01 DIAGNOSIS — R00.1 BRADYCARDIA: ICD-10-CM

## 2022-03-01 LAB
ALBUMIN SERPL-MCNC: 3.7 G/DL (ref 3.5–5.2)
ALBUMIN/GLOB SERPL: 1.1 G/DL
ALP SERPL-CCNC: 76 U/L (ref 39–117)
ALT SERPL W P-5'-P-CCNC: 12 U/L (ref 1–41)
ANION GAP SERPL CALCULATED.3IONS-SCNC: 9.4 MMOL/L (ref 5–15)
AST SERPL-CCNC: 18 U/L (ref 1–40)
BILIRUB SERPL-MCNC: 0.6 MG/DL (ref 0–1.2)
BUN SERPL-MCNC: 18 MG/DL (ref 8–23)
BUN/CREAT SERPL: 15.9 (ref 7–25)
CALCIUM SPEC-SCNC: 9.7 MG/DL (ref 8.6–10.5)
CHLORIDE SERPL-SCNC: 109 MMOL/L (ref 98–107)
CHOLEST SERPL-MCNC: 168 MG/DL (ref 0–200)
CO2 SERPL-SCNC: 22.6 MMOL/L (ref 22–29)
CREAT SERPL-MCNC: 1.13 MG/DL (ref 0.76–1.27)
EGFRCR SERPLBLD CKD-EPI 2021: 64.1 ML/MIN/1.73
GLOBULIN UR ELPH-MCNC: 3.3 GM/DL
GLUCOSE SERPL-MCNC: 98 MG/DL (ref 65–99)
HDLC SERPL-MCNC: 41 MG/DL (ref 40–60)
LDLC SERPL CALC-MCNC: 109 MG/DL (ref 0–100)
LDLC/HDLC SERPL: 2.61 {RATIO}
POTASSIUM SERPL-SCNC: 4.7 MMOL/L (ref 3.5–5.2)
PROT SERPL-MCNC: 7 G/DL (ref 6–8.5)
SODIUM SERPL-SCNC: 141 MMOL/L (ref 136–145)
TRIGL SERPL-MCNC: 99 MG/DL (ref 0–150)
TSH SERPL DL<=0.05 MIU/L-ACNC: 1.34 UIU/ML (ref 0.27–4.2)
VLDLC SERPL-MCNC: 18 MG/DL (ref 5–40)

## 2022-03-01 PROCEDURE — 36415 COLL VENOUS BLD VENIPUNCTURE: CPT

## 2022-03-01 PROCEDURE — 80061 LIPID PANEL: CPT

## 2022-03-01 PROCEDURE — 80053 COMPREHEN METABOLIC PANEL: CPT

## 2022-03-01 PROCEDURE — 84443 ASSAY THYROID STIM HORMONE: CPT

## 2022-03-08 ENCOUNTER — OFFICE VISIT (OUTPATIENT)
Dept: CARDIOLOGY | Facility: CLINIC | Age: 87
End: 2022-03-08

## 2022-03-08 VITALS
WEIGHT: 230 LBS | BODY MASS INDEX: 36.1 KG/M2 | SYSTOLIC BLOOD PRESSURE: 134 MMHG | HEART RATE: 49 BPM | DIASTOLIC BLOOD PRESSURE: 72 MMHG | HEIGHT: 67 IN

## 2022-03-08 DIAGNOSIS — R00.1 BRADYCARDIA: Primary | ICD-10-CM

## 2022-03-08 DIAGNOSIS — E66.9 OBESITY (BMI 30-39.9): ICD-10-CM

## 2022-03-08 DIAGNOSIS — I25.810 CORONARY ARTERY DISEASE INVOLVING CORONARY BYPASS GRAFT OF NATIVE HEART WITHOUT ANGINA PECTORIS: ICD-10-CM

## 2022-03-08 DIAGNOSIS — I10 ESSENTIAL HYPERTENSION: ICD-10-CM

## 2022-03-08 DIAGNOSIS — I49.3 PVC (PREMATURE VENTRICULAR CONTRACTION): ICD-10-CM

## 2022-03-08 PROCEDURE — 93000 ELECTROCARDIOGRAM COMPLETE: CPT | Performed by: INTERNAL MEDICINE

## 2022-03-08 PROCEDURE — 99214 OFFICE O/P EST MOD 30 MIN: CPT | Performed by: INTERNAL MEDICINE

## 2022-03-08 NOTE — PROGRESS NOTES
Subjective:     Encounter Date:03/08/2022      Patient ID: Royal Beckman is a 86 y.o. male.    Chief Complaint : Follow-up for CAD, MI, CABG, bradycardia, dyslipidemia  History of Present Illness      Mr. Royal Beckman i has PMH of    -CAD,NSTEMI, CABG 2001 with LIMA to LAD, SVG to D1, SVG to OM, SVG to PDA  -Bradycardia  -Dyslipidemia  -Hyperglycemia, obesity  -Anemia, thrombocytopenia  -Strep cellulitis of lower extremities following CABG  -Former smoker quit 1977    Here for follow-up.  Patient denies any chest pain lightheadedness dizziness loss of consciousness.  Patient had labs from 7/23/2019 reveal cholesterol 167 triglycerides 115 HDL low at 31 .  CMP with low albumin at 3.3.  Labs from 7/27/2021 reveal normal CK and CMP, glucose 100, cholesterol 153, triglycerides 84, HDL 40, LDL 97.  Labs from 3/1/2022 reveal CMP normal.  Lipid profile with cholesterol 168 triglycerides 99 HDL 41 .  TSH normal.    Patient's heart rate is low and has PVCs but he states that these are normal for him all his life.  Patient did not have symptoms of chest pain before his   CABG.  Patient's arterial blood pressure is 134/72, heart rate 44 bpm, O2 sat of 98% on room air.  BMI is over 36.    Assessment :  -PVCs  -Bradycardia  -CAD, CABG  -Dyslipidemia  -Hypertension  -Obesity with BMI over 30    Plan :    Reviewed EKG results with patient  Continue aspirin atenolol and simvastatin to help with PVCs, CAD, CABG, hypertension, dyslipidemia as tolerated.    Reviewed BMI over 30, counseled on weight loss diet and exercise  We will follow-up in 1 year or sooner if needed.  Discussed about Covid vaccination.  Patient already took his booster.  Reviewed low HDL counseled on walking exercise.  Patient already states he is exercising on a regular basis.  We will continue to monitor.  [facility] or shortness of breath walks an hour at 2.4 miles an hour.  We will  follow-up in 1 year/or sooner if needed and check lipid profile and CMP before visit.        ECG 12 Lead    Date/Time: 3/8/2022 6:22 AM  Performed by: Sanjiv Bae MD  Authorized by: Sanjiv Bae MD   Comparison: compared with previous ECG from 8/5/2021  Comparison to previous ECG: EKG done today reviewed/interpreted by me reveals sinus bradycardia at the rate of 43 bpm with occasional PVCs and moderate intraventricular conduction delay nonspecific ST-T changes, no new change compared to EKG from 8/3/2021              The following portions of the patient's history were reviewed and updated as appropriate: allergies, current medications, past family history, past medical history, past social history, past surgical history and problem list.    Assessment:         [facility]     Diagnosis Plan   1. Bradycardia  Comprehensive Metabolic Panel    Lipid Panel   2. PVC (premature ventricular contraction)  Comprehensive Metabolic Panel    Lipid Panel   3. Essential hypertension  Comprehensive Metabolic Panel    Lipid Panel   4. Coronary artery disease involving coronary bypass graft of native heart without angina pectoris  Comprehensive Metabolic Panel    Lipid Panel   5. Obesity (BMI 30-39.9)            Plan:               Past Medical History:  Past Medical History:   Diagnosis Date   • Anemia    • B12 deficiency    • Coronary artery disease    • Hyperglycemia    • Hyperlipidemia    • Myocardial infarction (HCC)      Past Surgical History:  Past Surgical History:   Procedure Laterality Date   • CARDIAC CATHETERIZATION     • COLONOSCOPY     • CORONARY ARTERY BYPASS GRAFT        Allergies:  No Known Allergies  Home Meds:  Current Meds:     Current Outpatient Medications:   •  amLODIPine (NORVASC) 10 MG tablet, Take 1 tablet by mouth Daily., Disp: 90 tablet, Rfl: 3  •  aspirin 81 MG tablet, ASPIRIN 81 MG ORAL TABLET, Disp: , Rfl:   •  atenolol (TENORMIN) 25 MG tablet, Take 0.5 tablets by mouth Daily.,  "Disp: 90 tablet, Rfl: 3  •  Cyanocobalamin (B-12) 1000 MCG capsule, B-12 1000 MCG CAPS, Disp: , Rfl:   •  niacin 500 MG tablet, NIACIN 500 MG TABS, Disp: , Rfl:   •  Omega-3 Fatty Acids ([employer] FISH OIL) 1200 MG capsule, CVS FISH OIL 1200 MG CAPS, Disp: , Rfl:   •  simvastatin (ZOCOR) 20 MG tablet, TAKE ONE TABLET BY MOUTH EVERY DAY, Disp: 90 tablet, Rfl: 3  Social History:   Social History     Tobacco Use   • Smoking status: Former Smoker     Quit date:      Years since quittin.2   • Smokeless tobacco: Never Used   Substance Use Topics   • Alcohol use: Yes     Comment: occasionally      Family History:  Family History   Problem Relation Age of Onset   • Aneurysm Father    • Heart disease Brother               Review of Systems   Cardiovascular: Negative for chest pain, leg swelling and palpitations.   Respiratory: Negative for shortness of breath.    Neurological: Negative for dizziness and numbness.     All other systems are negative         Objective:     Physical Exam  /72 (BP Location: Left arm, Patient Position: Sitting, Cuff Size: Large Adult)   Pulse (!) 49   Ht 170.2 cm (67\")   Wt 104 kg (230 lb)   BMI 36.02 kg/m²   General:  Appears in no acute distress  Eyes: Sclera is anicteric,  conjunctiva is clear   HEENT:  No JVD.  No carotid bruits  Respiratory: Respirations regular and unlabored at rest.  Clear to auscultation  Cardiovascular: S1,S2 Regular rate and rhythm. No murmur, rub or gallop auscultated.   Extremities: No digital clubbing or cyanosis, no edema  Skin: Color pink. Skin warm and dry to touch. No rashes  No xanthoma  Neuro: Alert and awake.    Lab Reviewed:         Sanjiv Bae MD  3/13/2022 06:26 EDT      Much of the above report is an electronic transcription/translation of the spoken language to printed text using Dragon Software. As such, the subtleties and finesse of the spoken language may permit erroneous, or at times, nonsensical words or phrases to be " inadvertently transcribed; thus changes may be made at a later date to rectify these errors.

## 2022-07-25 RX ORDER — AMLODIPINE BESYLATE 10 MG/1
TABLET ORAL
Qty: 90 TABLET | Refills: 2 | Status: SHIPPED | OUTPATIENT
Start: 2022-07-25

## 2022-07-25 NOTE — TELEPHONE ENCOUNTER
Rx Refill Note  Requested Prescriptions     Pending Prescriptions Disp Refills   • amLODIPine (NORVASC) 10 MG tablet [Pharmacy Med Name: AMLODIPINE 10MG] 90 tablet 2     Sig: TAKE ONE TABLET BY MOUTH EVERY DAY      Last office visit with prescribing clinician: 3/8/2022      Next office visit with prescribing clinician: 3/9/2023            Lynn Mckenna MA  07/25/22, 11:05 EDT

## 2022-08-08 ENCOUNTER — TELEPHONE (OUTPATIENT)
Dept: FAMILY MEDICINE CLINIC | Facility: CLINIC | Age: 87
End: 2022-08-08

## 2022-08-08 ENCOUNTER — LAB (OUTPATIENT)
Dept: FAMILY MEDICINE CLINIC | Facility: CLINIC | Age: 87
End: 2022-08-08

## 2022-08-08 ENCOUNTER — OFFICE VISIT (OUTPATIENT)
Dept: FAMILY MEDICINE CLINIC | Facility: CLINIC | Age: 87
End: 2022-08-08

## 2022-08-08 VITALS
HEIGHT: 67 IN | SYSTOLIC BLOOD PRESSURE: 124 MMHG | RESPIRATION RATE: 16 BRPM | HEART RATE: 68 BPM | OXYGEN SATURATION: 96 % | DIASTOLIC BLOOD PRESSURE: 62 MMHG | WEIGHT: 225 LBS | BODY MASS INDEX: 35.31 KG/M2

## 2022-08-08 DIAGNOSIS — R41.3 MEMORY DIFFICULTIES: ICD-10-CM

## 2022-08-08 DIAGNOSIS — Z76.89 ENCOUNTER TO ESTABLISH CARE: Primary | ICD-10-CM

## 2022-08-08 LAB
ALBUMIN SERPL-MCNC: 4 G/DL (ref 3.5–5.2)
ALBUMIN/GLOB SERPL: 1.4 G/DL
ALP SERPL-CCNC: 64 U/L (ref 39–117)
ALT SERPL W P-5'-P-CCNC: 9 U/L (ref 1–41)
AMORPH URATE CRY URNS QL MICRO: ABNORMAL /HPF
ANION GAP SERPL CALCULATED.3IONS-SCNC: 10 MMOL/L (ref 5–15)
AST SERPL-CCNC: 19 U/L (ref 1–40)
BACTERIA UR QL AUTO: ABNORMAL /HPF
BILIRUB SERPL-MCNC: 0.6 MG/DL (ref 0–1.2)
BILIRUB UR QL STRIP: NEGATIVE
BUN SERPL-MCNC: 14 MG/DL (ref 8–23)
BUN/CREAT SERPL: 15.1 (ref 7–25)
CALCIUM SPEC-SCNC: 9 MG/DL (ref 8.6–10.5)
CHLORIDE SERPL-SCNC: 105 MMOL/L (ref 98–107)
CLARITY UR: CLEAR
CO2 SERPL-SCNC: 24 MMOL/L (ref 22–29)
COLOR UR: ABNORMAL
CREAT SERPL-MCNC: 0.93 MG/DL (ref 0.76–1.27)
EGFRCR SERPLBLD CKD-EPI 2021: 81 ML/MIN/1.73
GLOBULIN UR ELPH-MCNC: 2.8 GM/DL
GLUCOSE SERPL-MCNC: 88 MG/DL (ref 65–99)
GLUCOSE UR STRIP-MCNC: NEGATIVE MG/DL
HGB UR QL STRIP.AUTO: NEGATIVE
HYALINE CASTS UR QL AUTO: ABNORMAL /LPF
KETONES UR QL STRIP: ABNORMAL
LEUKOCYTE ESTERASE UR QL STRIP.AUTO: ABNORMAL
MUCOUS THREADS URNS QL MICRO: ABNORMAL /HPF
NITRITE UR QL STRIP: NEGATIVE
PH UR STRIP.AUTO: 5.5 [PH] (ref 5–8)
POTASSIUM SERPL-SCNC: 4.4 MMOL/L (ref 3.5–5.2)
PROT SERPL-MCNC: 6.8 G/DL (ref 6–8.5)
PROT UR QL STRIP: ABNORMAL
RBC # UR STRIP: ABNORMAL /HPF
REF LAB TEST METHOD: ABNORMAL
SODIUM SERPL-SCNC: 139 MMOL/L (ref 136–145)
SP GR UR STRIP: 1.02 (ref 1–1.03)
SQUAMOUS #/AREA URNS HPF: ABNORMAL /HPF
TSH SERPL DL<=0.05 MIU/L-ACNC: 1.47 UIU/ML (ref 0.27–4.2)
UROBILINOGEN UR QL STRIP: ABNORMAL
WBC # UR STRIP: ABNORMAL /HPF

## 2022-08-08 PROCEDURE — 81001 URINALYSIS AUTO W/SCOPE: CPT | Performed by: NURSE PRACTITIONER

## 2022-08-08 PROCEDURE — 99204 OFFICE O/P NEW MOD 45 MIN: CPT | Performed by: NURSE PRACTITIONER

## 2022-08-08 PROCEDURE — 36415 COLL VENOUS BLD VENIPUNCTURE: CPT

## 2022-08-08 PROCEDURE — 84443 ASSAY THYROID STIM HORMONE: CPT | Performed by: NURSE PRACTITIONER

## 2022-08-08 PROCEDURE — 80053 COMPREHEN METABOLIC PANEL: CPT | Performed by: NURSE PRACTITIONER

## 2022-08-08 NOTE — PROGRESS NOTES
"Chief Complaint  Establish Care    Subjective     {Problem List  Visit Diagnosis   Encounters  Notes  Medications  Labs  Result Review Imaging  Media :23}     Royal Beckman presents to [facility]  History of Present Illness    Is a new patient, her today to establish care and has concerns about his memory    His wife and son are present today    He is c/o trouble with remembering things - \"anything and everything, it just depends\"  Worse over the past few months to 1 year  Has forgotten name of his daughter in law ( to son x 30 years), having trouble remembering where places are  Is putting things away in incorrect places at home  Is becoming frustrated with trying to find words  Forgets conversatoins from a couple of days ago and from a couple of minutes ago    Forgets where he is sometimes and where he is going    Has h/o cad/mi/cabg, HTN, varicose vein, b12 def., obesity, hyperglycemia, bph, anemia, retinal detachment  Current medicines are amlodipine 10mg, asa 81mg, atenolol 12.5 mg, b12, niacin, omega 3, zocor 20mg    Sees cardiology, Dr. Bae  Ophthalmology, Dr. Arce    Wife has an upcoming appt with a neurologist and would like for him to see the same - will call with the name    Review of Systems   Constitutional: Negative.  Negative for activity change, appetite change, fatigue and fever.   Respiratory: Negative.  Negative for cough, shortness of breath and wheezing.    Cardiovascular: Negative.  Negative for chest pain.   Gastrointestinal: Negative.  Negative for abdominal pain, constipation and diarrhea.   Genitourinary: Negative.    Musculoskeletal: Positive for back pain.        Endorses low back pain and hip pain  His son tells me that he has an unusually high pain tolerance   Neurological: Negative for dizziness, weakness and headaches.        Endorses dfficulty with his memory   Psychiatric/Behavioral: Negative for dysphoric mood and sleep " "disturbance. The patient is not nervous/anxious.      Exercises regularly, three times weekly at the [facility] since having a heart attack    Objective   Vital Signs:  /62 (BP Location: Left arm, Patient Position: Sitting)   Pulse 68   Resp 16   Ht 170.2 cm (67.01\")   Wt 102 kg (225 lb)   SpO2 96%   BMI 35.23 kg/m²     BP Readings from Last 3 Encounters:   08/08/22 124/62   03/08/22 134/72   08/05/21 152/79        Wt Readings from Last 3 Encounters:   08/08/22 102 kg (225 lb)   03/08/22 104 kg (230 lb)   08/05/21 99.3 kg (219 lb)              Physical Exam  Vitals reviewed.   Constitutional:       Appearance: Normal appearance. He is obese.   Cardiovascular:      Rate and Rhythm: Normal rate and regular rhythm.      Heart sounds: Normal heart sounds.   Pulmonary:      Effort: Pulmonary effort is normal.      Breath sounds: Normal breath sounds.   Musculoskeletal:      Cervical back: Neck supple.      Right lower leg: Edema present.      Left lower leg: Edema present.   Skin:     General: Skin is warm.   Neurological:      Mental Status: He is alert and oriented to person, place, and time.          MMSE done at this visit - scored 27/30    Result Review :     CMP    CMP 3/1/22   Glucose 98   BUN 18   Creatinine 1.13   Sodium 141   Potassium 4.7   Chloride 109 (A)   Calcium 9.7   Albumin 3.70   Total Bilirubin 0.6   Alkaline Phosphatase 76   AST (SGOT) 18   ALT (SGPT) 12   (A) Abnormal value                Lipid Panel    Lipid Panel 3/1/22   Total Cholesterol 168   Triglycerides 99   HDL Cholesterol 41   VLDL Cholesterol 18   LDL Cholesterol  109 (A)   LDL/HDL Ratio 2.61   (A) Abnormal value            TSH    TSH 3/1/22   TSH 1.340                     Assessment and Plan    Diagnoses and all orders for this visit:    1. Encounter to establish care (Primary)    2. Memory difficulties  -     Ambulatory Referral to Neurology  -     Comprehensive metabolic panel; Future  -     TSH Rfx On Abnormal To Free T4; " Future  -     Urinalysis With Culture If Indicated - Urine, Clean Catch; Future             Follow Up   Return in about 4 months (around 12/8/2022).  Patient was given instructions and counseling regarding his condition or for health maintenance advice. Please see specific information pulled into the AVS if appropriate.

## 2022-08-08 NOTE — TELEPHONE ENCOUNTER
PATIENT'S WIFE CALLED BACK TO LET OCTAVIO KNOW THAT SHE GOES TO [facility] AT [facility] AND THAT IS THE OFFICE SHE WOULD LIKE PATIENT TO BE REFERRED TO.

## 2022-08-22 RX ORDER — ATENOLOL 25 MG/1
TABLET ORAL
Qty: 45 TABLET | Refills: 3 | Status: SHIPPED | OUTPATIENT
Start: 2022-08-22

## 2022-08-22 NOTE — TELEPHONE ENCOUNTER
Rx Refill Note  Requested Prescriptions     Pending Prescriptions Disp Refills   • atenolol (TENORMIN) 25 MG tablet [Pharmacy Med Name: ATENOLOL 25MG] 45 tablet 3     Sig: TAKE 1/2 TABLET BY MOUTH EVERY DAY      Last office visit with prescribing clinician: 3/8/2022      Next office visit with prescribing clinician: 3/9/2023            Lin Horner MA  08/22/22, 16:59 EDT

## 2022-09-26 RX ORDER — SIMVASTATIN 20 MG
TABLET ORAL
Qty: 90 TABLET | Refills: 2 | Status: SHIPPED | OUTPATIENT
Start: 2022-09-26

## 2022-09-26 NOTE — TELEPHONE ENCOUNTER
Rx Refill Note  Requested Prescriptions     Pending Prescriptions Disp Refills   • simvastatin (ZOCOR) 20 MG tablet [Pharmacy Med Name: SIMVASTATIN 20MG]  2     Sig: TAKE ONE TABLET BY MOUTH EVERY DAY      Last office visit with prescribing clinician: 3/8/2022      Next office visit with prescribing clinician: 3/9/2023            Iman Sin MA  09/26/22, 10:21 EDT

## 2022-11-28 DIAGNOSIS — E55.9 VITAMIN D DEFICIENCY: ICD-10-CM

## 2022-11-28 DIAGNOSIS — Z12.5 PROSTATE CANCER SCREENING: ICD-10-CM

## 2022-11-28 DIAGNOSIS — Z00.00 MEDICARE ANNUAL WELLNESS VISIT, SUBSEQUENT: Primary | ICD-10-CM

## 2022-12-01 ENCOUNTER — LAB (OUTPATIENT)
Dept: FAMILY MEDICINE CLINIC | Facility: CLINIC | Age: 87
End: 2022-12-01

## 2022-12-01 DIAGNOSIS — Z12.5 PROSTATE CANCER SCREENING: ICD-10-CM

## 2022-12-01 DIAGNOSIS — Z00.00 MEDICARE ANNUAL WELLNESS VISIT, SUBSEQUENT: ICD-10-CM

## 2022-12-01 DIAGNOSIS — E55.9 VITAMIN D DEFICIENCY: ICD-10-CM

## 2022-12-01 LAB
25(OH)D3 SERPL-MCNC: 20.6 NG/ML (ref 30–100)
ALBUMIN SERPL-MCNC: 3.7 G/DL (ref 3.5–5.2)
ALBUMIN/GLOB SERPL: 1.4 G/DL
ALP SERPL-CCNC: 76 U/L (ref 39–117)
ALT SERPL W P-5'-P-CCNC: 10 U/L (ref 1–41)
ANION GAP SERPL CALCULATED.3IONS-SCNC: 10.8 MMOL/L (ref 5–15)
AST SERPL-CCNC: 20 U/L (ref 1–40)
BILIRUB SERPL-MCNC: 0.4 MG/DL (ref 0–1.2)
BUN SERPL-MCNC: 14 MG/DL (ref 8–23)
BUN/CREAT SERPL: 14.6 (ref 7–25)
CALCIUM SPEC-SCNC: 9.1 MG/DL (ref 8.6–10.5)
CHLORIDE SERPL-SCNC: 108 MMOL/L (ref 98–107)
CHOLEST SERPL-MCNC: 130 MG/DL (ref 0–200)
CO2 SERPL-SCNC: 23.2 MMOL/L (ref 22–29)
CREAT SERPL-MCNC: 0.96 MG/DL (ref 0.76–1.27)
DEPRECATED RDW RBC AUTO: 47.9 FL (ref 37–54)
EGFRCR SERPLBLD CKD-EPI 2021: 77.5 ML/MIN/1.73
ERYTHROCYTE [DISTWIDTH] IN BLOOD BY AUTOMATED COUNT: 13.6 % (ref 12.3–15.4)
GLOBULIN UR ELPH-MCNC: 2.6 GM/DL
GLUCOSE SERPL-MCNC: 100 MG/DL (ref 65–99)
HBA1C MFR BLD: 5.2 % (ref 3.5–5.6)
HCT VFR BLD AUTO: 38.4 % (ref 37.5–51)
HDLC SERPL-MCNC: 34 MG/DL (ref 40–60)
HGB BLD-MCNC: 13 G/DL (ref 13–17.7)
LDLC SERPL CALC-MCNC: 78 MG/DL (ref 0–100)
LDLC/HDLC SERPL: 2.28 {RATIO}
MCH RBC QN AUTO: 31.9 PG (ref 26.6–33)
MCHC RBC AUTO-ENTMCNC: 33.9 G/DL (ref 31.5–35.7)
MCV RBC AUTO: 94.3 FL (ref 79–97)
PLATELET # BLD AUTO: 134 10*3/MM3 (ref 140–450)
PMV BLD AUTO: 11.5 FL (ref 6–12)
POTASSIUM SERPL-SCNC: 4.4 MMOL/L (ref 3.5–5.2)
PROT SERPL-MCNC: 6.3 G/DL (ref 6–8.5)
PSA SERPL-MCNC: 1.16 NG/ML (ref 0–4)
RBC # BLD AUTO: 4.07 10*6/MM3 (ref 4.14–5.8)
SODIUM SERPL-SCNC: 142 MMOL/L (ref 136–145)
TRIGL SERPL-MCNC: 92 MG/DL (ref 0–150)
TSH SERPL DL<=0.05 MIU/L-ACNC: 1.87 UIU/ML (ref 0.27–4.2)
VLDLC SERPL-MCNC: 18 MG/DL (ref 5–40)
WBC NRBC COR # BLD: 5.14 10*3/MM3 (ref 3.4–10.8)

## 2022-12-01 PROCEDURE — 85025 COMPLETE CBC W/AUTO DIFF WBC: CPT | Performed by: NURSE PRACTITIONER

## 2022-12-01 PROCEDURE — 83036 HEMOGLOBIN GLYCOSYLATED A1C: CPT | Performed by: NURSE PRACTITIONER

## 2022-12-01 PROCEDURE — 84443 ASSAY THYROID STIM HORMONE: CPT | Performed by: NURSE PRACTITIONER

## 2022-12-01 PROCEDURE — G0103 PSA SCREENING: HCPCS | Performed by: NURSE PRACTITIONER

## 2022-12-01 PROCEDURE — 80053 COMPREHEN METABOLIC PANEL: CPT | Performed by: NURSE PRACTITIONER

## 2022-12-01 PROCEDURE — 36415 COLL VENOUS BLD VENIPUNCTURE: CPT

## 2022-12-01 PROCEDURE — 82306 VITAMIN D 25 HYDROXY: CPT | Performed by: NURSE PRACTITIONER

## 2022-12-01 PROCEDURE — 80061 LIPID PANEL: CPT | Performed by: NURSE PRACTITIONER

## 2022-12-03 RX ORDER — ERGOCALCIFEROL 1.25 MG/1
50000 CAPSULE ORAL WEEKLY
Qty: 12 CAPSULE | Refills: 0 | Status: SHIPPED | OUTPATIENT
Start: 2022-12-03

## 2022-12-12 ENCOUNTER — OFFICE VISIT (OUTPATIENT)
Dept: FAMILY MEDICINE CLINIC | Facility: CLINIC | Age: 87
End: 2022-12-12

## 2022-12-12 VITALS
TEMPERATURE: 97.8 F | DIASTOLIC BLOOD PRESSURE: 62 MMHG | RESPIRATION RATE: 16 BRPM | SYSTOLIC BLOOD PRESSURE: 120 MMHG | BODY MASS INDEX: 35.52 KG/M2 | WEIGHT: 221 LBS | HEART RATE: 46 BPM | HEIGHT: 66 IN

## 2022-12-12 DIAGNOSIS — Z00.00 MEDICARE ANNUAL WELLNESS VISIT, SUBSEQUENT: Primary | ICD-10-CM

## 2022-12-12 DIAGNOSIS — Z00.00 ANNUAL PHYSICAL EXAM: ICD-10-CM

## 2022-12-12 DIAGNOSIS — Z23 NEEDS FLU SHOT: ICD-10-CM

## 2022-12-12 PROCEDURE — G0439 PPPS, SUBSEQ VISIT: HCPCS | Performed by: NURSE PRACTITIONER

## 2022-12-12 PROCEDURE — 1170F FXNL STATUS ASSESSED: CPT | Performed by: NURSE PRACTITIONER

## 2022-12-12 PROCEDURE — 1160F RVW MEDS BY RX/DR IN RCRD: CPT | Performed by: NURSE PRACTITIONER

## 2022-12-12 PROCEDURE — 90686 IIV4 VACC NO PRSV 0.5 ML IM: CPT | Performed by: NURSE PRACTITIONER

## 2022-12-12 PROCEDURE — G0008 ADMIN INFLUENZA VIRUS VAC: HCPCS | Performed by: NURSE PRACTITIONER

## 2022-12-12 NOTE — PROGRESS NOTES
The ABCs of the Annual Wellness Visit  Subsequent Medicare Wellness Visit    Subjective    Royal Beckman is a 87 y.o. male who presents for a Subsequent Medicare Wellness Visit and annual physical exam.  Labs were done prior to visit encounter - overall were fine, vitamin d is low at 200.6, started on replacement therapy.    At last visit was referred to neuro for issues with his memory - he has an appointment with Dr. Seipel on 12/15/22    He is exercising regularly, eating a healthy and balanced diet    The following portions of the patient's history were reviewed and   updated as appropriate: allergies, current medications, past family history, past medical history, past social history, past surgical history and problem list.    Compared to one year ago, the patient feels his physical   health is the same.    Compared to one year ago, the patient feels his mental   health is the same.    Recent Hospitalizations:  He was not admitted to the hospital during the last year.       Current Medical Providers:  Patient Care Team:  Edin Santacruz APRN as PCP - General (Family Medicine)    Outpatient Medications Prior to Visit   Medication Sig Dispense Refill   • amLODIPine (NORVASC) 10 MG tablet TAKE ONE TABLET BY MOUTH EVERY DAY 90 tablet 2   • aspirin 81 MG tablet ASPIRIN 81 MG ORAL TABLET     • atenolol (TENORMIN) 25 MG tablet TAKE 1/2 TABLET BY MOUTH EVERY DAY 45 tablet 3   • Cyanocobalamin (B-12) 1000 MCG capsule B-12 1000 MCG CAPS     • niacin 500 MG tablet NIACIN 500 MG TABS     • Omega-3 Fatty Acids ([employer] FISH OIL) 1200 MG capsule CVS FISH OIL 1200 MG CAPS     • simvastatin (ZOCOR) 20 MG tablet TAKE ONE TABLET BY MOUTH EVERY DAY 90 tablet 2   • vitamin D (ERGOCALCIFEROL) 1.25 MG (23601 UT) capsule capsule Take 1 capsule by mouth 1 (One) Time Per Week. 12 capsule 0     No facility-administered medications prior to visit.       No opioid medication identified on active medication list. I have reviewed chart  "for other potential  high risk medication/s and harmful drug interactions in the elderly.          Aspirin is on active medication list. Aspirin use is indicated based on review of current medical condition/s. Pros and cons of this therapy have been discussed today. Benefits of this medication outweigh potential harm.  Patient has been encouraged to continue taking this medication.  .      Patient Active Problem List   Diagnosis   • Chronic coronary artery disease   • Hypertension   • Old myocardial infarction   • Asymptomatic varicose veins of lower extremity   • Benign prostatic hyperplasia   • Hyperglycemia   • Leg edema   • Obesity with body mass index 30 or greater     Advance Care Planning  Advance Directive is not on file.  ACP discussion was declined by the patient. Patient has an advance directive (not in EMR), copy requested.     Objective    Vitals:    12/12/22 1333   BP: 120/62   BP Location: Right arm   Patient Position: Sitting   Pulse: (!) 46   Resp: 16   Temp: 97.8 °F (36.6 °C)   Weight: 100 kg (221 lb)   Height: 167.6 cm (66\")     Estimated body mass index is 35.67 kg/m² as calculated from the following:    Height as of this encounter: 167.6 cm (66\").    Weight as of this encounter: 100 kg (221 lb).    Class 2 Severe Obesity (BMI >=35 and <=39.9). Obesity-related health conditions include the following: hypertension and coronary heart disease. Obesity is unchanged. BMI is is above average; BMI management plan is completed. We discussed portion control and increasing exercise.      Does the patient have evidence of cognitive impairment? Yes    He has been having difficulties with his memory.    Lab Results   Component Value Date    TRIG 92 12/01/2022    HDL 34 (L) 12/01/2022    LDL 78 12/01/2022    VLDL 18 12/01/2022    HGBA1C 5.2 12/01/2022        HEALTH RISK ASSESSMENT    Smoking Status:  Social History     Tobacco Use   Smoking Status Former   • Types: Cigarettes   • Quit date: 1977   • Years " since quittin.9   Smokeless Tobacco Never     Alcohol Consumption:  Social History     Substance and Sexual Activity   Alcohol Use Yes    Comment: occasionally     Fall Risk Screen:    RAGHAVENDRA Fall Risk Assessment was completed, and patient is at LOW risk for falls.Assessment completed on:2022    Depression Screening:  PHQ-2/PHQ-9 Depression Screening 2022   Little Interest or Pleasure in Doing Things 0-->not at all   Feeling Down, Depressed or Hopeless 0-->not at all   PHQ-9: Brief Depression Severity Measure Score 0       Health Habits and Functional and Cognitive Screening:  No flowsheet data found.    Age-appropriate Screening Schedule:  Refer to the list below for future screening recommendations based on patient's age, sex and/or medical conditions. Orders for these recommended tests are listed in the plan section. The patient has been provided with a written plan.    Health Maintenance   Topic Date Due   • TDAP/TD VACCINES (1 - Tdap) Never done   • ZOSTER VACCINE (1 of 2) Never done   • INFLUENZA VACCINE  Never done   • LIPID PANEL  2023                CMS Preventative Services Quick Reference  Risk Factors Identified During Encounter  Hearing Problem: see's audiologist, has hearing aid  The above risks/problems have been discussed with the patient.  Pertinent information has been shared with the patient in the After Visit Summary.  An After Visit Summary and PPPS were made available to the patient.    Follow Up:   Next Medicare Wellness visit to be scheduled in 1 year.       Additional E&M Note during same encounter follows:  Patient has multiple medical problems which are significant and separately identifiable that require additional work above and beyond the Medicare Wellness Visit.      Chief Complaint  Annual Exam and Medicare Wellness-subsequent    Subjective        HPI  Royal Beckman is also being seen today for annual physical    Review of Systems   Constitutional: Negative for  "appetite change, fatigue and fever.   Respiratory: Negative.  Negative for chest tightness and shortness of breath.    Cardiovascular: Negative.  Negative for chest pain, palpitations and leg swelling.   Gastrointestinal: Negative.  Negative for abdominal pain, constipation and diarrhea.   Genitourinary: Negative.  Negative for dysuria, frequency and urgency.   Musculoskeletal: Negative.    Allergic/Immunologic: Negative for environmental allergies.   Neurological: Negative.  Negative for dizziness and weakness.   Psychiatric/Behavioral: Negative.  Negative for dysphoric mood and sleep disturbance. The patient is not nervous/anxious.        Objective   Vital Signs:  /62 (BP Location: Right arm, Patient Position: Sitting)   Pulse (!) 46   Temp 97.8 °F (36.6 °C)   Resp 16   Ht 167.6 cm (66\")   Wt 100 kg (221 lb)   BMI 35.67 kg/m²     Physical Exam  Vitals reviewed.   Constitutional:       Appearance: Normal appearance. He is obese.   HENT:      Right Ear: Tympanic membrane and ear canal normal.      Left Ear: Tympanic membrane and ear canal normal.      Mouth/Throat:      Mouth: Mucous membranes are moist.      Pharynx: Oropharynx is clear.   Neck:      Vascular: No carotid bruit.   Cardiovascular:      Rate and Rhythm: Regular rhythm. Bradycardia present.      Pulses: Normal pulses.      Comments: Trace bilateral ankle edema  Pulmonary:      Effort: Pulmonary effort is normal.      Breath sounds: Normal breath sounds.   Abdominal:      General: Bowel sounds are normal.      Palpations: Abdomen is soft.      Tenderness: There is no abdominal tenderness. There is no right CVA tenderness or left CVA tenderness.   Musculoskeletal:      Cervical back: Neck supple. No tenderness.      Right lower le+ Edema present.      Left lower le+ Edema present.   Skin:     General: Skin is warm.   Neurological:      Mental Status: He is alert and oriented to person, place, and time.            Lipid Panel    Lipid " Panel 3/1/22 12/1/22   Total Cholesterol 168 130   Triglycerides 99 92   HDL Cholesterol 41 34 (A)   VLDL Cholesterol 18 18   LDL Cholesterol  109 (A) 78   LDL/HDL Ratio 2.61 2.28   (A) Abnormal value            TSH    TSH 3/1/22 8/8/22 12/1/22   TSH 1.340 1.470 1.870           Most Recent A1C    HGBA1C Most Recent 12/1/22   Hemoglobin A1C 5.2           UA    Urinalysis 8/8/22 8/8/22    1044 1044   Squamous Epithelial Cells, UA  0-2   Specific Gravity, UA 1.023    Ketones, UA Trace (A)    Blood, UA Negative    Leukocytes, UA Trace (A)    Nitrite, UA Negative    RBC, UA  0-2   WBC, UA  0-2   Bacteria, UA  None Seen   (A) Abnormal value            PSA    PSA 12/1/22   PSA 1.160                      Assessment and Plan   Diagnoses and all orders for this visit:    1. Medicare annual wellness visit, subsequent (Primary)    2. Annual physical exam    3. Needs flu shot  -     FluLaval/Fluzone >6 mos (5227-3210)             Follow Up   Return in about 1 year (around 12/12/2023) for Annual physical, Medicare Wellness.  Patient was given instructions and counseling regarding his condition or for health maintenance advice. Please see specific information pulled into the AVS if appropriate.

## 2022-12-13 NOTE — PROGRESS NOTES
"Chief Complaint  NEW PATIENT (MEMORY)    Subjective            Royal Beckman presents to [facility] for Memory  History of Present Illness   New patient referred by Edin JAY for memory Patient He c/o trouble with remembering things - \"anything and everything, it just depends\"     Has forgotten name of his daughter in law ( to son x 30 years),   having trouble remembering where places. Is putting things away in incorrect places at home  ,is becoming frustrated with trying to find words,    Forgets conversatoins from a couple of days ago and from a couple of minutes and   Forgets where he is sometimes and where he is going  .patient states memory has been worse over the past few months to 1 year.     Patient does have a family  h/o dementia (father)   He is currently not taking any medications for memory    Worked for telephone company for 30 years.     Has cochlear implant about 2008    Maximum   Score Patient's   Score Questions   5 5 \"What is the year?Season?Date?Day of the week?Month?\"   5 5 \"Where are we now: State?County?Town/city?Hospital?Floor?\"   3 3 3 Unrelated objects Number of trials:___   5 5 Count backward from 100 by sevens or spell WORLD backwards   3 0 Name 3 things from above   2 2 Identify 2 objects   1 1 Repeat the phrase: No ifs, ands,or buts.   3 2 Take paper in right hand, fold it in half, and put it on the floor.   1 1 Please read this and do what it says. \"Close your eyes\"   1 1 Make up and write a sentence about anything. Noun and verb   1 1 Copy this picture 10 angles must be present.   30 27 Total MMSE       Family History   Problem Relation Age of Onset   • Aneurysm Father    • Heart disease Brother        Past Medical History:   Diagnosis Date   • Anemia    • B12 deficiency    • Coronary artery disease    • Hyperglycemia    • Hyperlipidemia    • Myocardial infarction (HCC)        Social History     Socioeconomic History   • Marital status: " "   Tobacco Use   • Smoking status: Former     Types: Cigarettes     Quit date:      Years since quittin.9   • Smokeless tobacco: Never   Vaping Use   • Vaping Use: Never used   Substance and Sexual Activity   • Alcohol use: Yes     Comment: occasionally   • Drug use: Never   • Sexual activity: Defer         Current Outpatient Medications:   •  amLODIPine (NORVASC) 10 MG tablet, TAKE ONE TABLET BY MOUTH EVERY DAY, Disp: 90 tablet, Rfl: 2  •  aspirin 81 MG tablet, ASPIRIN 81 MG ORAL TABLET, Disp: , Rfl:   •  atenolol (TENORMIN) 25 MG tablet, TAKE 1/2 TABLET BY MOUTH EVERY DAY, Disp: 45 tablet, Rfl: 3  •  Cyanocobalamin (B-12) 1000 MCG capsule, B-12 1000 MCG CAPS, Disp: , Rfl:   •  niacin 500 MG tablet, NIACIN 500 MG TABS, Disp: , Rfl:   •  Omega-3 Fatty Acids ([employer]S FISH OIL) 1200 MG capsule, CVS FISH OIL 1200 MG CAPS, Disp: , Rfl:   •  simvastatin (ZOCOR) 20 MG tablet, TAKE ONE TABLET BY MOUTH EVERY DAY, Disp: 90 tablet, Rfl: 2  •  vitamin D (ERGOCALCIFEROL) 1.25 MG (94693 UT) capsule capsule, Take 1 capsule by mouth 1 (One) Time Per Week., Disp: 12 capsule, Rfl: 0  •  donepezil (Aricept) 5 MG tablet, Take 1 tablet by mouth Every Night., Disp: 90 tablet, Rfl: 0    Review of Systems   HENT: Positive for hearing loss.    Endocrine: Positive for cold intolerance.   Psychiatric/Behavioral: Positive for confusion.   All other systems reviewed and are negative.           Objective   Vital Signs:   /74   Pulse 53   Temp 97.8 °F (36.6 °C) (Infrared)   Ht 167.6 cm (66\")   Wt 102 kg (224 lb)   BMI 36.15 kg/m²     Physical Exam  Vitals reviewed.   Constitutional:       Appearance: Normal appearance.   HENT:      Nose: Nose normal.   Eyes:      Extraocular Movements: Extraocular movements intact.      Conjunctiva/sclera: Conjunctivae normal.      Pupils: Pupils are equal, round, and reactive to light.   Cardiovascular:      Rate and Rhythm: Normal rate.      Pulses: Normal pulses.   Pulmonary:      " Effort: Pulmonary effort is normal. No respiratory distress.   Neurological:      General: No focal deficit present.      Mental Status: He is alert and oriented to person, place, and time.   Psychiatric:         Mood and Affect: Mood normal.        Result Review :                Neurologic Exam     Mental Status   Oriented to person, place, and time.   Level of consciousness: alert    Cranial Nerves     CN III, IV, VI   Pupils are equal, round, and reactive to light.    CN V   Facial sensation intact.     CN VII   Facial expression full, symmetric.              Assessment and Plan    Diagnoses and all orders for this visit:    1. Memory loss (Primary)  -     CT Head With & Without Contrast; Future  -     Vitamin E; Future  -     Vitamin B6; Future  -     T Pallidum Antibody (FTA-Ab); Future  -     donepezil (Aricept) 5 MG tablet; Take 1 tablet by mouth Every Night.  Dispense: 90 tablet; Refill: 0      Mild memory impairment  Will obtain additional labs  Ct of head since cannot have mri brain  Start aricept      Follow Up   Return in about 6 months (around 6/15/2023).  Patient was given instructions and counseling regarding his condition or for health maintenance advice. Please see specific information pulled into the AVS if appropriate.         This document has been electronically signed by Joseph Seipel, MD on December 15, 2022 10:14 EST

## 2022-12-15 ENCOUNTER — LAB (OUTPATIENT)
Dept: LAB | Facility: HOSPITAL | Age: 87
End: 2022-12-15

## 2022-12-15 ENCOUNTER — OFFICE VISIT (OUTPATIENT)
Dept: NEUROLOGY | Facility: CLINIC | Age: 87
End: 2022-12-15

## 2022-12-15 VITALS
DIASTOLIC BLOOD PRESSURE: 74 MMHG | HEART RATE: 53 BPM | TEMPERATURE: 97.8 F | SYSTOLIC BLOOD PRESSURE: 126 MMHG | BODY MASS INDEX: 36 KG/M2 | HEIGHT: 66 IN | WEIGHT: 224 LBS

## 2022-12-15 DIAGNOSIS — R41.3 MEMORY LOSS: Primary | ICD-10-CM

## 2022-12-15 DIAGNOSIS — R41.3 MEMORY LOSS: ICD-10-CM

## 2022-12-15 PROCEDURE — 84207 ASSAY OF VITAMIN B-6: CPT

## 2022-12-15 PROCEDURE — 86780 TREPONEMA PALLIDUM: CPT

## 2022-12-15 PROCEDURE — 99204 OFFICE O/P NEW MOD 45 MIN: CPT | Performed by: PSYCHIATRY & NEUROLOGY

## 2022-12-15 PROCEDURE — 36415 COLL VENOUS BLD VENIPUNCTURE: CPT

## 2022-12-15 PROCEDURE — 84446 ASSAY OF VITAMIN E: CPT

## 2022-12-15 RX ORDER — DONEPEZIL HYDROCHLORIDE 5 MG/1
5 TABLET, FILM COATED ORAL NIGHTLY
Qty: 90 TABLET | Refills: 0 | Status: SHIPPED | OUTPATIENT
Start: 2022-12-15 | End: 2023-01-12 | Stop reason: SDUPTHER

## 2022-12-16 LAB — T PALLIDUM AB SER QL IF: NON REACTIVE

## 2022-12-19 LAB — PYRIDOXAL PHOS SERPL-MCNC: 5.7 UG/L (ref 3.4–65.2)

## 2022-12-20 LAB
A-TOCOPHEROL VIT E SERPL-MCNC: 16 MG/L (ref 9–29)
GAMMA TOCOPHEROL SERPL-MCNC: 0.6 MG/L (ref 0.5–4.9)

## 2023-01-05 ENCOUNTER — TELEPHONE (OUTPATIENT)
Dept: NEUROLOGY | Facility: CLINIC | Age: 88
End: 2023-01-05
Payer: MEDICARE

## 2023-01-05 ENCOUNTER — HOSPITAL ENCOUNTER (OUTPATIENT)
Dept: CT IMAGING | Facility: HOSPITAL | Age: 88
Discharge: HOME | End: 2023-01-05
Admitting: PSYCHIATRY & NEUROLOGY
Payer: MEDICARE

## 2023-01-05 DIAGNOSIS — R41.3 MEMORY LOSS: ICD-10-CM

## 2023-01-05 LAB
CREAT BLDA-MCNC: 0.8 MG/DL (ref 0.6–1.3)
EGFRCR SERPLBLD CKD-EPI 2021: 86.7 ML/MIN/1.73

## 2023-01-05 PROCEDURE — 0 IOPAMIDOL PER 1 ML: Performed by: PSYCHIATRY & NEUROLOGY

## 2023-01-05 PROCEDURE — 70470 CT HEAD/BRAIN W/O & W/DYE: CPT

## 2023-01-05 PROCEDURE — 82565 ASSAY OF CREATININE: CPT

## 2023-01-05 RX ADMIN — IOPAMIDOL 50 ML: 755 INJECTION, SOLUTION INTRAVENOUS at 14:35

## 2023-01-05 NOTE — PROGRESS NOTES
The brain shows no specific cause for memory impairment except for jayne age related changes.  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

## 2023-01-06 ENCOUNTER — TELEPHONE (OUTPATIENT)
Dept: NEUROLOGY | Facility: CLINIC | Age: 88
End: 2023-01-06
Payer: MEDICARE

## 2023-01-06 NOTE — TELEPHONE ENCOUNTER
----- Message from Joseph F Seipel, MD sent at 1/5/2023  4:55 PM EST -----  The brain shows no specific cause for memory impairment except for jayne age related changes.  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

## 2023-01-09 ENCOUNTER — TELEPHONE (OUTPATIENT)
Dept: NEUROLOGY | Facility: CLINIC | Age: 88
End: 2023-01-09
Payer: MEDICARE

## 2023-01-09 DIAGNOSIS — I67.1 ANEURYSM, CEREBRAL, NONRUPTURED: Primary | ICD-10-CM

## 2023-01-09 NOTE — TELEPHONE ENCOUNTER
.  Will refer to neurosurgery, dr Edis Huerta at [facility], operated by [facility] for evaluation and management of the aneurysm found on the head CT

## 2023-01-11 ENCOUNTER — TELEPHONE (OUTPATIENT)
Dept: NEUROLOGY | Facility: CLINIC | Age: 88
End: 2023-01-11

## 2023-01-11 DIAGNOSIS — R41.3 MEMORY LOSS: ICD-10-CM

## 2023-01-11 NOTE — TELEPHONE ENCOUNTER
Provider: SEIPEL  Caller: JANET  Relationship to Patient: WIFE  Pharmacy: N/A  Phone Number: [phone]  Reason for Call: PT'S WIFE CALLED AND WANTED TO KNOW IF PATIENT SHOULD CONTINUE TO TAKE DONEPEZIL?    PLEASE REVIEW AND ADVISE.  THANK YOU

## 2023-01-12 ENCOUNTER — TELEPHONE (OUTPATIENT)
Dept: NEUROLOGY | Facility: CLINIC | Age: 88
End: 2023-01-12
Payer: MEDICARE

## 2023-01-12 RX ORDER — DONEPEZIL HYDROCHLORIDE 10 MG/1
10 TABLET, FILM COATED ORAL NIGHTLY
Qty: 90 TABLET | Refills: 3 | Status: SHIPPED | OUTPATIENT
Start: 2023-01-12 | End: 2023-06-12 | Stop reason: SDUPTHER

## 2023-01-13 NOTE — TELEPHONE ENCOUNTER
Left detail message  
Royal calling into the pharmacy in regards to his prescription donepezil (Aricept) 10 MG tablet.  Patient went to [facility] to  new script and patient was going to be charged 90 dollars for this.  Patient mentioned he still has 20-30 days left of his 5mg pills and wanted to know why it was switched to one 10mg pill vs the 5mg tablets.  Patient does not want to get new script if he still has the 5mg tablet for a little while.  Mentioned to patient I would send a message to Dr. Seipel and see what he advises.   
THE standard dose after one month is 10mg    Use up the 5mg pills before starting the 10mg pills    Is the 10mg dose more expensive than the 5mg dose?  
Previously Declined (within the last year)

## 2023-01-16 NOTE — PROGRESS NOTES
Subjective   History of Present Illness: Royal Beckman is a 87 y.o. 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   CT head done on 1/5/23.  He is doing well today.  He is with his son and wife.  They help provide some of the medical history.  The CT was obtained because he has had severe memory loss over the past year.  He has been getting confused frequently and forgetting where he was supposed to be going and what he was doing.  He is also at times forgetting the names of friends and family members.  He denies any headaches.  Denies any episodes of loss of consciousness.  Denies any changes in strength or sensation.  Denies any strokelike episodes.    History of Present Illness    The following portions of the patient's history were reviewed and updated as appropriate: allergies, past family history, past medical history, past social history, past surgical history and problem list.    Past Medical History:   Diagnosis Date   • Anemia    • B12 deficiency    • Coronary artery disease    • Hyperglycemia    • Hyperlipidemia    • Myocardial infarction (HCC)         Past Surgical History:   Procedure Laterality Date   • CARDIAC CATHETERIZATION     • COLONOSCOPY     • CORONARY ARTERY BYPASS GRAFT            Current Outpatient Medications:   •  amLODIPine (NORVASC) 10 MG tablet, TAKE ONE TABLET BY MOUTH EVERY DAY, Disp: 90 tablet, Rfl: 2  •  aspirin 81 MG tablet, ASPIRIN 81 MG ORAL TABLET, Disp: , Rfl:   •  atenolol (TENORMIN) 25 MG tablet, TAKE 1/2 TABLET BY MOUTH EVERY DAY, Disp: 45 tablet, Rfl: 3  •  Cyanocobalamin (B-12) 1000 MCG capsule, B-12 1000 MCG CAPS, Disp: , Rfl:   •  donepezil (Aricept) 10 MG tablet, Take 1 tablet by mouth Every Night., Disp: 90 tablet, Rfl: 3  •  niacin 500 MG tablet, NIACIN 500 MG TABS, Disp: , Rfl:   •  Omega-3 Fatty Acids (CVS FISH OIL) 1200 MG capsule, CVS FISH OIL 1200 MG CAPS, Disp: , Rfl:   •   "simvastatin (ZOCOR) 20 MG tablet, TAKE ONE TABLET BY MOUTH EVERY DAY, Disp: 90 tablet, Rfl: 2  •  vitamin D (ERGOCALCIFEROL) 1.25 MG (51393 UT) capsule capsule, Take 1 capsule by mouth 1 (One) Time Per Week., Disp: 12 capsule, Rfl: 0     No Known Allergies     Social History     Socioeconomic History   • Marital status:    Tobacco Use   • Smoking status: Former     Types: Cigarettes     Quit date:      Years since quittin.0   • Smokeless tobacco: Never   Vaping Use   • Vaping Use: Never used   Substance and Sexual Activity   • Alcohol use: Yes     Comment: occasionally   • Drug use: Never   • Sexual activity: Defer        Family History   Problem Relation Age of Onset   • Aneurysm Father    • Heart disease Brother         Review of Systems   Constitutional: Positive for fatigue. Negative for chills and fever.   HENT: Positive for tinnitus (due to hearing aid). Negative for ear pain.    Eyes: Negative for pain and visual disturbance.   Respiratory: Negative for cough and shortness of breath.    Cardiovascular: Negative for chest pain and palpitations.   Gastrointestinal: Negative for nausea and vomiting.   Genitourinary: Negative for difficulty urinating and enuresis.   Musculoskeletal: Positive for gait problem (hip issue).   Skin: Negative for rash.   Neurological: Negative for dizziness, seizures, syncope, speech difficulty, light-headedness, numbness and headaches.   Psychiatric/Behavioral: Positive for confusion and decreased concentration.       Objective     Vitals:    23 1401   BP: 138/64   Pulse: (!) 49   Resp: 16   Temp: 97 °F (36.1 °C)   SpO2: 96%   Weight: 100 kg (221 lb)   Height: 167.6 cm (66\")     Body mass index is 35.67 kg/m².      Physical Exam  Neurologic Exam  Awake, alert, oriented x3  Pupils equal round reactive to light  Extraocular muscles intact  Face symmetric  Speech is fluent and clear  No pronator drift  Motor exam  Bilateral deltoids 5/5, bilateral biceps 5/5, " bilateral triceps 5/5, bilateral wrist extension 5/5 bilateral hand  5/5  Bilateral hip flexion 5/5, bilateral knee extension 5/5, bilateral DF/PF 5/5  No clonus  No Onofre's reflex  Steady normal gait  Able to detect  light touch in all 4 extremities      Assessment & Plan   Independent Review of Radiographic Studies:      I personally reviewed the images from the following studies.  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  There is significant streak artifact from the left-sided cochlear implant.  No other lesions are identified.    Medical Decision Makin-year-old male with a 10 mm incidentally found anterior communicating artery aneurysm  -The CT head was performed due to severe memory loss which has developed over the past year.  We reviewed the natural history of intracranial aneurysms.  We also discussed management strategies including a craniotomy for clip ligation and endovascular embolization.  We talked about the risks and benefits of both techniques.  We have decided to follow-up in 6 months with a repeat CTA to evaluate for any growth of the aneurysm.  If there is any growth of the aneurysm we will consider treatment.  We have decided to hold off on embolization at this time due to the ongoing cognitive decline  -I have stressed the importance of maintaining systolic blood pressure less than 140 mmHg.  He currently does not smoke cigarettes  -I have asked him to call back or come back sooner if he develops any severe headaches or new neurologic symptoms  Diagnoses and all orders for this visit:    1. Brain aneurysm (Primary)  -     CT Angiogram Head With Contrast; Future  -     CT Angiogram Carotids; Future      Return in about 6 months (around 2023).    I spent 45 minutes reviewing the medical record, reviewing the CT images, discussing the management of intracranial aneurysms

## 2023-01-25 ENCOUNTER — OFFICE VISIT (OUTPATIENT)
Dept: NEUROSURGERY | Facility: CLINIC | Age: 88
End: 2023-01-25
Payer: MEDICARE

## 2023-01-25 VITALS
RESPIRATION RATE: 16 BRPM | TEMPERATURE: 97 F | DIASTOLIC BLOOD PRESSURE: 64 MMHG | SYSTOLIC BLOOD PRESSURE: 138 MMHG | BODY MASS INDEX: 35.52 KG/M2 | HEART RATE: 49 BPM | OXYGEN SATURATION: 96 % | WEIGHT: 221 LBS | HEIGHT: 66 IN

## 2023-01-25 DIAGNOSIS — I67.1 BRAIN ANEURYSM: Primary | ICD-10-CM

## 2023-01-25 PROCEDURE — 99204 OFFICE O/P NEW MOD 45 MIN: CPT | Performed by: NEUROLOGICAL SURGERY

## 2023-01-27 ENCOUNTER — PATIENT ROUNDING (BHMG ONLY) (OUTPATIENT)
Dept: NEUROSURGERY | Facility: CLINIC | Age: 88
End: 2023-01-27
Payer: MEDICARE

## 2023-03-07 ENCOUNTER — LAB (OUTPATIENT)
Dept: LAB | Facility: HOSPITAL | Age: 88
End: 2023-03-07
Payer: MEDICARE

## 2023-03-07 DIAGNOSIS — R00.1 BRADYCARDIA: ICD-10-CM

## 2023-03-07 DIAGNOSIS — I49.3 PVC (PREMATURE VENTRICULAR CONTRACTION): ICD-10-CM

## 2023-03-07 DIAGNOSIS — I25.810 CORONARY ARTERY DISEASE INVOLVING CORONARY BYPASS GRAFT OF NATIVE HEART WITHOUT ANGINA PECTORIS: ICD-10-CM

## 2023-03-07 DIAGNOSIS — I10 ESSENTIAL HYPERTENSION: ICD-10-CM

## 2023-03-07 LAB
ALBUMIN SERPL-MCNC: 4 G/DL (ref 3.5–5.2)
ALBUMIN/GLOB SERPL: 1.5 G/DL
ALP SERPL-CCNC: 90 U/L (ref 39–117)
ALT SERPL W P-5'-P-CCNC: 13 U/L (ref 1–41)
ANION GAP SERPL CALCULATED.3IONS-SCNC: 8.5 MMOL/L (ref 5–15)
AST SERPL-CCNC: 21 U/L (ref 1–40)
BILIRUB SERPL-MCNC: 0.5 MG/DL (ref 0–1.2)
BUN SERPL-MCNC: 18 MG/DL (ref 8–23)
BUN/CREAT SERPL: 22.5 (ref 7–25)
CALCIUM SPEC-SCNC: 9.5 MG/DL (ref 8.6–10.5)
CHLORIDE SERPL-SCNC: 106 MMOL/L (ref 98–107)
CHOLEST SERPL-MCNC: 139 MG/DL (ref 0–200)
CO2 SERPL-SCNC: 26.5 MMOL/L (ref 22–29)
CREAT SERPL-MCNC: 0.8 MG/DL (ref 0.76–1.27)
EGFRCR SERPLBLD CKD-EPI 2021: 86.7 ML/MIN/1.73
GLOBULIN UR ELPH-MCNC: 2.6 GM/DL
GLUCOSE SERPL-MCNC: 99 MG/DL (ref 65–99)
HDLC SERPL-MCNC: 40 MG/DL (ref 40–60)
LDLC SERPL CALC-MCNC: 84 MG/DL (ref 0–100)
LDLC/HDLC SERPL: 2.09 {RATIO}
POTASSIUM SERPL-SCNC: 4.5 MMOL/L (ref 3.5–5.2)
PROT SERPL-MCNC: 6.6 G/DL (ref 6–8.5)
SODIUM SERPL-SCNC: 141 MMOL/L (ref 136–145)
TRIGL SERPL-MCNC: 78 MG/DL (ref 0–150)
VLDLC SERPL-MCNC: 15 MG/DL (ref 5–40)

## 2023-03-07 PROCEDURE — 80053 COMPREHEN METABOLIC PANEL: CPT

## 2023-03-07 PROCEDURE — 36415 COLL VENOUS BLD VENIPUNCTURE: CPT

## 2023-03-07 PROCEDURE — 80061 LIPID PANEL: CPT

## 2023-03-09 ENCOUNTER — OFFICE VISIT (OUTPATIENT)
Dept: CARDIOLOGY | Facility: CLINIC | Age: 88
End: 2023-03-09
Payer: MEDICARE

## 2023-03-09 VITALS
HEART RATE: 57 BPM | WEIGHT: 216 LBS | SYSTOLIC BLOOD PRESSURE: 117 MMHG | DIASTOLIC BLOOD PRESSURE: 53 MMHG | HEIGHT: 66 IN | BODY MASS INDEX: 34.72 KG/M2 | OXYGEN SATURATION: 96 %

## 2023-03-09 DIAGNOSIS — R00.1 BRADYCARDIA: Primary | ICD-10-CM

## 2023-03-09 DIAGNOSIS — E66.9 OBESITY (BMI 30-39.9): ICD-10-CM

## 2023-03-09 DIAGNOSIS — I10 ESSENTIAL HYPERTENSION: ICD-10-CM

## 2023-03-09 DIAGNOSIS — E78.5 DYSLIPIDEMIA: ICD-10-CM

## 2023-03-09 DIAGNOSIS — I49.3 PVC (PREMATURE VENTRICULAR CONTRACTION): ICD-10-CM

## 2023-03-09 DIAGNOSIS — I25.810 CORONARY ARTERY DISEASE INVOLVING CORONARY BYPASS GRAFT OF NATIVE HEART WITHOUT ANGINA PECTORIS: ICD-10-CM

## 2023-03-09 PROCEDURE — 93000 ELECTROCARDIOGRAM COMPLETE: CPT | Performed by: INTERNAL MEDICINE

## 2023-03-09 PROCEDURE — 99214 OFFICE O/P EST MOD 30 MIN: CPT | Performed by: INTERNAL MEDICINE

## 2023-04-24 RX ORDER — AMLODIPINE BESYLATE 10 MG/1
TABLET ORAL
Qty: 90 TABLET | Refills: 1 | Status: SHIPPED | OUTPATIENT
Start: 2023-04-24

## 2023-04-24 NOTE — TELEPHONE ENCOUNTER
Rx Refill Note  Requested Prescriptions     Pending Prescriptions Disp Refills   • amLODIPine (NORVASC) 10 MG tablet [Pharmacy Med Name: AMLODIPINE 10MG] 90 tablet 1     Sig: TAKE ONE TABLET BY MOUTH EVERY DAY      Last office visit with prescribing clinician: 3/9/2023   Next office visit with prescribing clinician: 3/11/2024                        Lynn Mckenna MA  04/24/23, 09:48 EDT

## 2023-06-12 ENCOUNTER — OFFICE VISIT (OUTPATIENT)
Dept: NEUROLOGY | Facility: CLINIC | Age: 88
End: 2023-06-12
Payer: MEDICARE

## 2023-06-12 VITALS
HEIGHT: 66 IN | DIASTOLIC BLOOD PRESSURE: 67 MMHG | WEIGHT: 205 LBS | BODY MASS INDEX: 32.95 KG/M2 | HEART RATE: 42 BPM | SYSTOLIC BLOOD PRESSURE: 120 MMHG

## 2023-06-12 DIAGNOSIS — R41.3 MEMORY LOSS: Primary | ICD-10-CM

## 2023-06-12 DIAGNOSIS — I67.1 CEREBRAL ANEURYSM WITHOUT RUPTURE: ICD-10-CM

## 2023-06-12 PROCEDURE — 3078F DIAST BP <80 MM HG: CPT | Performed by: PSYCHIATRY & NEUROLOGY

## 2023-06-12 PROCEDURE — 99214 OFFICE O/P EST MOD 30 MIN: CPT | Performed by: PSYCHIATRY & NEUROLOGY

## 2023-06-12 PROCEDURE — 1160F RVW MEDS BY RX/DR IN RCRD: CPT | Performed by: PSYCHIATRY & NEUROLOGY

## 2023-06-12 PROCEDURE — 3074F SYST BP LT 130 MM HG: CPT | Performed by: PSYCHIATRY & NEUROLOGY

## 2023-06-12 PROCEDURE — 1159F MED LIST DOCD IN RCRD: CPT | Performed by: PSYCHIATRY & NEUROLOGY

## 2023-06-12 RX ORDER — DONEPEZIL HYDROCHLORIDE 10 MG/1
10 TABLET, FILM COATED ORAL 2 TIMES DAILY
Qty: 180 TABLET | Refills: 3 | Status: SHIPPED | OUTPATIENT
Start: 2023-06-12

## 2023-06-12 NOTE — PROGRESS NOTES
"Chief Complaint  Follow-up (MEMORY)    Subjective          Royal Beckman presents to [facility] for F/U MEMORY  History of Present Illness  Patient is here to f/u on memory, per spouse patient has good and bad days,   Memory is some better.   he is currently taking aricept 10 mg 1 hs and reports no side effects    He will turn the wrong way at times, eventually gets to where he needs to be.     Pt has a aneurysm has seen surgeon will have repeat scan, may have procedure                  ===PREV. OV 12/15/22===  New patient referred by Edin JAY for memory Patient He c/o trouble with remembering things - \"anything and everything, it just depends\"      Has forgotten name of his daughter in law ( to son x 30 years),   having trouble remembering where places. Is putting things away in incorrect places at home  ,is becoming frustrated with trying to find words,     Forgets conversatoins from a couple of days ago and from a couple of minutes and   Forgets where he is sometimes and where he is going  .patient states memory has been worse over the past few months to 1 year.      Patient does have a family  h/o dementia (father)   He is currently not taking any medications for memory     Worked for [employer] company for 30 years.      Has cochlear implant about 2008     Maximum   Score Patient's   Score Questions   5 5 \"What is the year?Season?Date?Day of the week?Month?\"   5 5 \"Where are we now: State?County?Town/city?Hospital?Floor?\"   3 3 3 Unrelated objects Number of trials:___   5 5 Count backward from 100 by sevens or spell WORLD backwards   3 0 Name 3 things from above   2 2 Identify 2 objects   1 1 Repeat the phrase: No ifs, ands,or buts.   3 2 Take paper in right hand, fold it in half, and put it on the floor.   1 1 Please read this and do what it says. \"Close your eyes\"   1 1 Make up and write a sentence about anything. Noun and verb   1 1 Copy this picture 10 angles must " "be present.   30 27 Total MMS       Current Outpatient Medications:     amLODIPine (NORVASC) 10 MG tablet, TAKE ONE TABLET BY MOUTH EVERY DAY, Disp: 90 tablet, Rfl: 1    aspirin 81 MG tablet, ASPIRIN 81 MG ORAL TABLET, Disp: , Rfl:     atenolol (TENORMIN) 25 MG tablet, TAKE 1/2 TABLET BY MOUTH EVERY DAY, Disp: 45 tablet, Rfl: 3    Cyanocobalamin (B-12) 1000 MCG capsule, B-12 1000 MCG CAPS, Disp: , Rfl:     donepezil (Aricept) 10 MG tablet, Take 1 tablet by mouth 2 (Two) Times a Day., Disp: 180 tablet, Rfl: 3    niacin 500 MG tablet, NIACIN 500 MG TABS, Disp: , Rfl:     Omega-3 Fatty Acids (CVS FISH OIL) 1200 MG capsule, CVS FISH OIL 1200 MG CAPS, Disp: , Rfl:     simvastatin (ZOCOR) 20 MG tablet, TAKE ONE TABLET BY MOUTH EVERY DAY, Disp: 90 tablet, Rfl: 2    vitamin D (ERGOCALCIFEROL) 1.25 MG (28108 UT) capsule capsule, Take 1 capsule by mouth 1 (One) Time Per Week., Disp: 12 capsule, Rfl: 0    Review of Systems   Constitutional:  Positive for activity change, appetite change and fatigue.   Respiratory:  Positive for cough.    Cardiovascular:  Positive for leg swelling.   Endocrine: Positive for cold intolerance.   Musculoskeletal:  Positive for back pain and joint swelling.   Psychiatric/Behavioral:  Positive for behavioral problems, confusion and sleep disturbance.    All other systems reviewed and are negative.       Objective:    Vital Signs:   /67   Pulse (!) 42   Ht 167.6 cm (66\")   Wt 93 kg (205 lb)   BMI 33.09 kg/m²     Physical Exam  Vitals reviewed.   Constitutional:       Appearance: Normal appearance.   Cardiovascular:      Rate and Rhythm: Normal rate.   Pulmonary:      Effort: Pulmonary effort is normal. No respiratory distress.   Neurological:      Mental Status: He is alert and oriented to person, place, and time.   Psychiatric:         Mood and Affect: Mood normal.      Result Review :                Neurologic Exam     Mental Status   Oriented to person, place, and time.       Assessment " and Plan    Diagnoses and all orders for this visit:    1. Memory loss (Primary)  -     donepezil (Aricept) 10 MG tablet; Take 1 tablet by mouth 2 (Two) Times a Day.  Dispense: 180 tablet; Refill: 3    2. 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  Patient was given instructions and counseling regarding his condition or for health maintenance advice. Please see specific information pulled into the AVS if appropriate.     This document has been electronically signed by Joseph Seipel, MD on June 12, 2023 16:15 EDT

## 2023-07-03 ENCOUNTER — TELEPHONE (OUTPATIENT)
Dept: NEUROLOGY | Facility: CLINIC | Age: 88
End: 2023-07-03

## 2023-07-03 NOTE — TELEPHONE ENCOUNTER
Provider: SEIPEL, JOSEPH, MD    Caller: JANET    Relationship to Patient: SELF    Phone Number: [phone]    Reason for Call: CALLING REGARDING THE CHANGE OF PRESCRIPTION ON DONEPEZIL 10 MG.  STATED PT IS TO TAKE IT TWICE A DAY.   STATED THE PHARMACY ([facility]) TOLD HER THE INSURANCE WON'T PAY FOR THE INCREASE AND THEY WOULD GET IN TOUCH WITH PROVIDER.   STATED SHE WENT TO GET A REFILL AND WAS TOLD IT WAS TOO EARLY.   STATED PT HAS 10 DAYS LEFT.  STATED IT IS ALRIGHT TO LEAVE A MESSAGE ON THE MACHINE.    PLEASE CALL & ADVISE

## 2023-07-21 NOTE — PROGRESS NOTES
Subjective   History of Present Illness: Royal Beckman is a 87 y.o. male is here today for follow-up for brain aneurysm. CTA head/CTA Carotids done on 7/6/23.  He is here today with his son and wife.  They help provide much of the medical history.  They report that his dementia has progressed over the past year.  They report that he frequently gets lost.  He also has outbursts of anger and trouble controlling his emotions.  He denies any headaches.  Denies any changes in strength or sensation.  Denies any strokelike episodes.     History of Present Illness    The following portions of the patient's history were reviewed and updated as appropriate: allergies, current medications, past family history, past medical history, past social history, past surgical history, and problem list.    Past Medical History:   Diagnosis Date    Anemia     B12 deficiency     Coronary artery disease     Deep vein thrombosis 10/2001    quad bypass    Hyperglycemia     Hyperlipidemia     Low back pain 6 to 8 months ago    Myocardial infarction     Peripheral neuropathy don't remember        Past Surgical History:   Procedure Laterality Date    CARDIAC CATHETERIZATION      COLONOSCOPY      CORONARY ARTERY BYPASS GRAFT            Current Outpatient Medications:     amLODIPine (NORVASC) 10 MG tablet, TAKE ONE TABLET BY MOUTH EVERY DAY, Disp: 90 tablet, Rfl: 3    aspirin 81 MG tablet, ASPIRIN 81 MG ORAL TABLET, Disp: , Rfl:     atenolol (TENORMIN) 25 MG tablet, TAKE 1/2 TABLET BY MOUTH EVERY DAY, Disp: 45 tablet, Rfl: 3    Cyanocobalamin (B-12) 1000 MCG capsule, B-12 1000 MCG CAPS, Disp: , Rfl:     donepezil (Aricept) 10 MG tablet, Take 1 tablet by mouth 2 (Two) Times a Day., Disp: 180 tablet, Rfl: 3    niacin 500 MG tablet, NIACIN 500 MG TABS, Disp: , Rfl:     Omega-3 Fatty Acids (CVS FISH OIL) 1200 MG capsule, CVS FISH OIL 1200 MG CAPS, Disp: , Rfl:     simvastatin (ZOCOR) 20 MG tablet, TAKE ONE TABLET BY MOUTH EVERY DAY, Disp: 90 tablet,  "Rfl: 2    vitamin D (ERGOCALCIFEROL) 1.25 MG (30219 UT) capsule capsule, Take 1 capsule by mouth 1 (One) Time Per Week., Disp: 12 capsule, Rfl: 0     No Known Allergies     Social History     Socioeconomic History    Marital status:    Tobacco Use    Smoking status: Former     Packs/day: 1.50     Years: 5.00     Pack years: 7.50     Types: Cigarettes     Start date: 10/10/1964     Quit date: 12/15/1969     Years since quittin.6    Smokeless tobacco: Never   Vaping Use    Vaping Use: Never used   Substance and Sexual Activity    Alcohol use: Yes     Comment: occasional, only    Drug use: No    Sexual activity: Not Currently     Partners: Female        Family History   Problem Relation Age of Onset    Aneurysm Father     Heart disease Brother         Review of Systems   Constitutional:  Positive for fatigue.   Musculoskeletal:  Positive for gait problem (legs are weak and make balance off sometimes).   Neurological:  Positive for weakness (legs). Negative for dizziness, light-headedness and headaches.   Psychiatric/Behavioral:  Positive for confusion and decreased concentration.      Objective     Vitals:    23 1422   BP: 110/58   Pulse: (!) 43   Resp: 16   Temp: 97.3 °F (36.3 °C)   SpO2: 97%   Weight: 91.9 kg (202 lb 9.6 oz)   Height: 167.6 cm (65.98\")     Body mass index is 32.72 kg/m².      Physical Exam  Neurologic Exam  Awake, alert, oriented x3  Pupils equal round reactive to light  Extraocular muscles intact  Face symmetric  Speech is fluent and clear  No pronator drift  Motor exam  Bilateral deltoids 5/5, bilateral biceps 5/5, bilateral triceps 5/5, bilateral wrist extension 5/5 bilateral hand  5/5  Bilateral hip flexion 5/5, bilateral knee extension 5/5, bilateral DF/PF 5/5  No clonus  No Onofre's reflex  Steady normal gait  Able to detect  light touch in all 4 extremities        Assessment & Plan   Independent Review of Radiographic Studies:      I personally reviewed the images from " the following studies.  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  He denies any recent headaches.  He has had further progression of his cognitive decline.  We discussed the risks and benefits of a cerebral angiogram and the natural history of intracranial aneurysms.  Due to the progressive nature of his cognitive decline/dementia he would like to hold off from treatment for now of the aneurysm.  I will plan to see him back in 1 year with a repeat CTA to evaluate for any growth.  If there is any growth of the aneurysm we will again discussed the risks and benefits of treatment  Diagnoses and all orders for this visit:    1. Brain aneurysm (Primary)  -     CT Angiogram Head With Contrast; Future  -     CT Angiogram Carotids; Future      Return in about 1 year (around 2024).  I spent 30 minutes reviewing the medical record, discussing the natural history of aneurysms, discussing the signs and symptoms of subarachnoid hemorrhage/aneurysm rupture, discussing the risks and benefits of aneurysm treatment

## 2023-07-24 ENCOUNTER — OFFICE VISIT (OUTPATIENT)
Dept: NEUROSURGERY | Facility: CLINIC | Age: 88
End: 2023-07-24
Payer: MEDICARE

## 2023-07-24 VITALS
DIASTOLIC BLOOD PRESSURE: 58 MMHG | RESPIRATION RATE: 16 BRPM | HEIGHT: 66 IN | SYSTOLIC BLOOD PRESSURE: 110 MMHG | OXYGEN SATURATION: 97 % | WEIGHT: 202.6 LBS | HEART RATE: 43 BPM | BODY MASS INDEX: 32.56 KG/M2 | TEMPERATURE: 97.3 F

## 2023-07-24 DIAGNOSIS — I67.1 BRAIN ANEURYSM: Primary | ICD-10-CM

## 2023-07-24 PROCEDURE — 3074F SYST BP LT 130 MM HG: CPT | Performed by: NEUROLOGICAL SURGERY

## 2023-07-24 PROCEDURE — 3078F DIAST BP <80 MM HG: CPT | Performed by: NEUROLOGICAL SURGERY

## 2023-07-24 PROCEDURE — 99214 OFFICE O/P EST MOD 30 MIN: CPT | Performed by: NEUROLOGICAL SURGERY

## 2023-07-24 PROCEDURE — 1159F MED LIST DOCD IN RCRD: CPT | Performed by: NEUROLOGICAL SURGERY

## 2023-07-24 PROCEDURE — 1160F RVW MEDS BY RX/DR IN RCRD: CPT | Performed by: NEUROLOGICAL SURGERY

## 2023-08-12 ENCOUNTER — HOSPITAL ENCOUNTER (OUTPATIENT)
Dept: CARDIOLOGY | Facility: HOSPITAL | Age: 88
Discharge: HOME | End: 2023-08-12
Payer: MEDICARE

## 2023-08-12 ENCOUNTER — TRANSCRIBE ORDERS (OUTPATIENT)
Dept: CARDIOLOGY | Facility: HOSPITAL | Age: 88
End: 2023-08-12
Payer: MEDICARE

## 2023-08-12 DIAGNOSIS — R60.0 LOCALIZED EDEMA: Primary | ICD-10-CM

## 2023-08-12 PROCEDURE — 93971 EXTREMITY STUDY: CPT

## 2023-08-16 ENCOUNTER — TELEPHONE (OUTPATIENT)
Dept: FAMILY MEDICINE CLINIC | Facility: CLINIC | Age: 88
End: 2023-08-16

## 2023-08-16 ENCOUNTER — OFFICE VISIT (OUTPATIENT)
Dept: FAMILY MEDICINE CLINIC | Facility: CLINIC | Age: 88
End: 2023-08-16
Payer: MEDICARE

## 2023-08-16 ENCOUNTER — LAB (OUTPATIENT)
Dept: FAMILY MEDICINE CLINIC | Facility: CLINIC | Age: 88
End: 2023-08-16
Payer: MEDICARE

## 2023-08-16 VITALS
RESPIRATION RATE: 18 BRPM | OXYGEN SATURATION: 97 % | HEART RATE: 50 BPM | HEIGHT: 66 IN | DIASTOLIC BLOOD PRESSURE: 58 MMHG | WEIGHT: 189 LBS | SYSTOLIC BLOOD PRESSURE: 116 MMHG | BODY MASS INDEX: 30.37 KG/M2

## 2023-08-16 DIAGNOSIS — M79.662 PAIN OF LEFT CALF: ICD-10-CM

## 2023-08-16 DIAGNOSIS — R60.0 LEG EDEMA: ICD-10-CM

## 2023-08-16 DIAGNOSIS — I83.92 VARICOSE VEINS OF LEFT LOWER EXTREMITY, UNSPECIFIED WHETHER COMPLICATED: Primary | ICD-10-CM

## 2023-08-16 PROCEDURE — 99213 OFFICE O/P EST LOW 20 MIN: CPT | Performed by: NURSE PRACTITIONER

## 2023-08-16 PROCEDURE — 1160F RVW MEDS BY RX/DR IN RCRD: CPT | Performed by: NURSE PRACTITIONER

## 2023-08-16 PROCEDURE — 80048 BASIC METABOLIC PNL TOTAL CA: CPT | Performed by: NURSE PRACTITIONER

## 2023-08-16 PROCEDURE — 1159F MED LIST DOCD IN RCRD: CPT | Performed by: NURSE PRACTITIONER

## 2023-08-16 PROCEDURE — 36415 COLL VENOUS BLD VENIPUNCTURE: CPT

## 2023-08-16 NOTE — PROGRESS NOTES
"Chief Complaint  Leg Pain    Subjective          Royal Beckman presents to [facility]  History of Present Illness    H/o cad, htn, varicose veins, obesity, bph, peripheral neuropathy, low back pain    Is here today with c/o lower left leg pain    Was seen on 8/12 at  with ble edema, pain in the left calf and varicose veins on the left  He had an ultrasound    He also c/o left knee and hip feeling achy  Has been using diclofenac topical pain reliever with some relief    He tells me that his leg hurts more when he is walking, however sitting down does not alleviate the pain    Review of Systems   Respiratory: Negative.  Negative for shortness of breath.    Cardiovascular: Negative.  Negative for chest pain.   Musculoskeletal:  Positive for myalgias.        H/o aching pain in his left leg which has been ongoing for the past two months, it has not gotten any worse, he does get some relief with use of topical pain releiver   Neurological: Negative.        Objective   Vital Signs:  /58 (BP Location: Right arm)   Pulse 50   Resp 18   Ht 167.4 cm (65.91\")   Wt 85.7 kg (189 lb)   SpO2 97%   BMI 30.59 kg/mý     BP Readings from Last 3 Encounters:   08/16/23 116/58   08/12/23 99/55   07/24/23 110/58        Wt Readings from Last 3 Encounters:   08/16/23 85.7 kg (189 lb)   08/12/23 85.7 kg (189 lb)   07/24/23 91.9 kg (202 lb 9.6 oz)              Physical Exam  Vitals reviewed.   Constitutional:       Appearance: Normal appearance.   Cardiovascular:      Rate and Rhythm: Normal rate and regular rhythm.      Heart sounds: Normal heart sounds.      Comments: Trace b/l le edema  Pulmonary:      Effort: Pulmonary effort is normal.      Breath sounds: Normal breath sounds.   Musculoskeletal:      Right lower leg: Edema present.      Left lower leg: Edema present.   Skin:     General: Skin is warm.   Neurological:      Mental Status: He is alert and oriented to person, place, and time. "      Result Review :                 Assessment and Plan    Diagnoses and all orders for this visit:    1. Varicose veins of left lower extremity, unspecified whether complicated (Primary)  -     Ambulatory Referral to Vascular Surgery    2. Leg edema  -     Basic metabolic panel; Future             Follow Up   Return in about 3 months (around 11/16/2023) for Medicare Wellness, annual physical.  Patient was given instructions and counseling regarding his condition or for health maintenance advice. Please see specific information pulled into the AVS if appropriate.

## 2023-08-16 NOTE — TELEPHONE ENCOUNTER
You referred patient to [facility] today but that is in [place] and they have trouble getting to [place].  Is there some place patient can be referred to in [place]?  Wife wants to be called with your answer.

## 2023-08-17 ENCOUNTER — TELEPHONE (OUTPATIENT)
Dept: FAMILY MEDICINE CLINIC | Facility: CLINIC | Age: 88
End: 2023-08-17
Payer: MEDICARE

## 2023-08-17 LAB
ANION GAP SERPL CALCULATED.3IONS-SCNC: 12.1 MMOL/L (ref 5–15)
BUN SERPL-MCNC: 17 MG/DL (ref 8–23)
BUN/CREAT SERPL: 27.4 (ref 7–25)
CALCIUM SPEC-SCNC: 9.4 MG/DL (ref 8.6–10.5)
CHLORIDE SERPL-SCNC: 108 MMOL/L (ref 98–107)
CO2 SERPL-SCNC: 21.9 MMOL/L (ref 22–29)
CREAT SERPL-MCNC: 0.62 MG/DL (ref 0.76–1.27)
EGFRCR SERPLBLD CKD-EPI 2021: 93.1 ML/MIN/1.73
GLUCOSE SERPL-MCNC: 95 MG/DL (ref 65–99)
POTASSIUM SERPL-SCNC: 4.2 MMOL/L (ref 3.5–5.2)
SODIUM SERPL-SCNC: 142 MMOL/L (ref 136–145)

## 2023-08-17 NOTE — TELEPHONE ENCOUNTER
----- Message from PIERRE Love sent at 8/17/2023  7:23 AM EDT -----  Please call  Karuna and tell him that his lab results are fine.  Thank you

## 2023-08-17 NOTE — TELEPHONE ENCOUNTER
Please call Mr. Beckman and tell him that the referral was sent to a practice in [place].  Thank you

## 2023-08-28 RX ORDER — ATENOLOL 25 MG/1
TABLET ORAL
Qty: 45 TABLET | Refills: 2 | Status: SHIPPED | OUTPATIENT
Start: 2023-08-28

## 2023-08-28 NOTE — TELEPHONE ENCOUNTER
Rx Refill Note  Requested Prescriptions     Signed Prescriptions Disp Refills    atenolol (TENORMIN) 25 MG tablet 45 tablet 2     Sig: TAKE 1/2 TABLET BY MOUTH EVERY DAY     Authorizing Provider: CHAPINCITO SHEA     Ordering User: YONI PAEZ      Last office visit with prescribing clinician: 3/9/2023   Last telemedicine visit with prescribing clinician: Visit date not found   Next office visit with prescribing clinician: 3/11/2024                           Yoni Paez MA  08/28/23, 14:48 EDT

## 2023-12-14 ENCOUNTER — LAB (OUTPATIENT)
Dept: FAMILY MEDICINE CLINIC | Facility: CLINIC | Age: 88
End: 2023-12-14
Payer: MEDICARE

## 2023-12-14 ENCOUNTER — OFFICE VISIT (OUTPATIENT)
Dept: FAMILY MEDICINE CLINIC | Facility: CLINIC | Age: 88
End: 2023-12-14
Payer: MEDICARE

## 2023-12-14 VITALS
RESPIRATION RATE: 18 BRPM | OXYGEN SATURATION: 98 % | HEIGHT: 66 IN | HEART RATE: 54 BPM | SYSTOLIC BLOOD PRESSURE: 118 MMHG | DIASTOLIC BLOOD PRESSURE: 60 MMHG | BODY MASS INDEX: 33.27 KG/M2 | WEIGHT: 207 LBS

## 2023-12-14 DIAGNOSIS — E55.9 VITAMIN D DEFICIENCY: ICD-10-CM

## 2023-12-14 DIAGNOSIS — G89.29 CHRONIC PAIN OF LEFT KNEE: ICD-10-CM

## 2023-12-14 DIAGNOSIS — Z00.00 MEDICARE ANNUAL WELLNESS VISIT, SUBSEQUENT: ICD-10-CM

## 2023-12-14 DIAGNOSIS — M25.562 CHRONIC PAIN OF LEFT KNEE: ICD-10-CM

## 2023-12-14 DIAGNOSIS — R71.0 DECREASED HEMOGLOBIN: ICD-10-CM

## 2023-12-14 DIAGNOSIS — Z00.00 MEDICARE ANNUAL WELLNESS VISIT, SUBSEQUENT: Primary | ICD-10-CM

## 2023-12-14 LAB
25(OH)D3 SERPL-MCNC: 25.6 NG/ML (ref 30–100)
ALBUMIN SERPL-MCNC: 4.1 G/DL (ref 3.5–5.2)
ALBUMIN/GLOB SERPL: 2 G/DL
ALP SERPL-CCNC: 88 U/L (ref 39–117)
ALT SERPL W P-5'-P-CCNC: 16 U/L (ref 1–41)
ANION GAP SERPL CALCULATED.3IONS-SCNC: 9 MMOL/L (ref 5–15)
AST SERPL-CCNC: 20 U/L (ref 1–40)
BILIRUB SERPL-MCNC: 0.4 MG/DL (ref 0–1.2)
BUN SERPL-MCNC: 15 MG/DL (ref 8–23)
BUN/CREAT SERPL: 17.6 (ref 7–25)
CALCIUM SPEC-SCNC: 9.3 MG/DL (ref 8.6–10.5)
CHLORIDE SERPL-SCNC: 109 MMOL/L (ref 98–107)
CO2 SERPL-SCNC: 23 MMOL/L (ref 22–29)
CREAT SERPL-MCNC: 0.85 MG/DL (ref 0.76–1.27)
DEPRECATED RDW RBC AUTO: 47.8 FL (ref 37–54)
EGFRCR SERPLBLD CKD-EPI 2021: 84.6 ML/MIN/1.73
ERYTHROCYTE [DISTWIDTH] IN BLOOD BY AUTOMATED COUNT: 13.6 % (ref 12.3–15.4)
GLOBULIN UR ELPH-MCNC: 2.1 GM/DL
GLUCOSE SERPL-MCNC: 120 MG/DL (ref 65–99)
HCT VFR BLD AUTO: 39.5 % (ref 37.5–51)
HGB BLD-MCNC: 12.8 G/DL (ref 13–17.7)
MCH RBC QN AUTO: 30.9 PG (ref 26.6–33)
MCHC RBC AUTO-ENTMCNC: 32.4 G/DL (ref 31.5–35.7)
MCV RBC AUTO: 95.4 FL (ref 79–97)
PLATELET # BLD AUTO: 179 10*3/MM3 (ref 140–450)
PMV BLD AUTO: 11.6 FL (ref 6–12)
POTASSIUM SERPL-SCNC: 4.1 MMOL/L (ref 3.5–5.2)
PROT SERPL-MCNC: 6.2 G/DL (ref 6–8.5)
RBC # BLD AUTO: 4.14 10*6/MM3 (ref 4.14–5.8)
SODIUM SERPL-SCNC: 141 MMOL/L (ref 136–145)
WBC NRBC COR # BLD AUTO: 5.31 10*3/MM3 (ref 3.4–10.8)

## 2023-12-14 PROCEDURE — 82306 VITAMIN D 25 HYDROXY: CPT | Performed by: NURSE PRACTITIONER

## 2023-12-14 PROCEDURE — 80053 COMPREHEN METABOLIC PANEL: CPT | Performed by: NURSE PRACTITIONER

## 2023-12-14 PROCEDURE — 85027 COMPLETE CBC AUTOMATED: CPT | Performed by: NURSE PRACTITIONER

## 2023-12-14 PROCEDURE — 81003 URINALYSIS AUTO W/O SCOPE: CPT | Performed by: NURSE PRACTITIONER

## 2023-12-14 PROCEDURE — 36415 COLL VENOUS BLD VENIPUNCTURE: CPT

## 2023-12-14 NOTE — PROGRESS NOTES
The ABCs of the Annual Wellness Visit  Subsequent Medicare Wellness Visit    Subjective      Royal Beckman is a 88 y.o. male who presents for a Subsequent Medicare Wellness Visit.    The following portions of the patient's history were reviewed and   updated as appropriate: allergies, current medications, past family history, past medical history, past social history, past surgical history, and problem list.  He has h/o cad, htn, varicose veins, hld, dvt, obesity, hyperglycemia, b12 def., anemia, low back pain, peripheral neuropathy    His current medicines are as noted below    BP is managed with amlodipine 10 mg qd, and atenolol 12.5 mg qd    Cholesterol is treated with simvastatin 20 mg qd    Sees cardiology, Dr. Bae,  Neurology, Dr. Seipel    Compared to one year ago, the patient feels his physical   health is the same.    Compared to one year ago, the patient feels his mental   health is the same.    Review of Systems   Constitutional: Negative.  Negative for appetite change, fatigue and fever.   Respiratory: Negative.  Negative for shortness of breath.    Cardiovascular: Negative.  Negative for chest pain.   Gastrointestinal: Negative.  Negative for abdominal pain, blood in stool, constipation and diarrhea.   Genitourinary:  Positive for frequency. Negative for dysuria and urgency.        Endorses chronic urinary frequency   Musculoskeletal:  Positive for arthralgias.        C/o chronic left knee pain for the past 1 year, his wife tells me that he has complained about it more in the past year but it has been bothering him for more than 1 year   Neurological:  Negative for dizziness, weakness and headaches.        Endorses some positional vertigo at times    Has h/o brain aneurysm, sees neurology, Dr. Seipel and has been referred to a neurosurgeon   Psychiatric/Behavioral: Negative.  Negative for dysphoric mood and sleep disturbance. The patient is not nervous/anxious.        Recent Hospitalizations:  He  was not admitted to the hospital during the last year.     Physical Exam  Vitals reviewed.   Constitutional:       Appearance: Normal appearance.   HENT:      Right Ear: Ear canal and external ear normal.      Left Ear: Tympanic membrane, ear canal and external ear normal.      Nose: Nose normal.      Mouth/Throat:      Mouth: Mucous membranes are moist.      Pharynx: Oropharynx is clear.   Eyes:      Extraocular Movements: Extraocular movements intact.   Neck:      Thyroid: No thyromegaly.      Vascular: No carotid bruit.   Cardiovascular:      Rate and Rhythm: Normal rate and regular rhythm.      Pulses: Normal pulses.      Heart sounds: Normal heart sounds.   Pulmonary:      Effort: Pulmonary effort is normal.      Breath sounds: Normal breath sounds.   Abdominal:      General: Bowel sounds are normal.      Palpations: Abdomen is soft.      Tenderness: There is no abdominal tenderness. There is no right CVA tenderness or left CVA tenderness.   Musculoskeletal:         General: Normal range of motion.      Cervical back: Neck supple. No tenderness.      Right lower leg: No edema.      Left lower leg: No edema.   Lymphadenopathy:      Cervical: No cervical adenopathy.   Skin:     General: Skin is warm.   Neurological:      Mental Status: He is alert and oriented to person, place, and time.   Psychiatric:         Mood and Affect: Mood normal.           BP Readings from Last 3 Encounters:   12/14/23 118/60   08/16/23 116/58   08/12/23 99/55      Current Medical Providers:  Patient Care Team:  Edin Mckeon APRN as PCP - General (Family Medicine)  Sanjiv Bae MD as Consulting Physician (Cardiology)    Outpatient Medications Prior to Visit   Medication Sig Dispense Refill    amLODIPine (NORVASC) 10 MG tablet TAKE ONE TABLET BY MOUTH EVERY DAY 90 tablet 3    aspirin 81 MG tablet ASPIRIN 81 MG ORAL TABLET      atenolol (TENORMIN) 25 MG tablet TAKE 1/2 TABLET BY MOUTH EVERY DAY 45 tablet 2     "Cyanocobalamin (B-12) 1000 MCG capsule B-12 1000 MCG CAPS      Diclofenac Sodium (VOLTAREN) 1 % gel gel Apply 4 g topically to the appropriate area as directed 3 (Three) Times a Day As Needed (pain). 100 g 0    donepezil (Aricept) 10 MG tablet Take 1 tablet by mouth 2 (Two) Times a Day. 180 tablet 3    niacin 500 MG tablet NIACIN 500 MG TABS      Omega-3 Fatty Acids ([employer] FISH OIL) 1200 MG capsule [employer] FISH OIL 1200 MG CAPS      simvastatin (ZOCOR) 20 MG tablet TAKE ONE TABLET BY MOUTH EVERY DAY 90 tablet 2    vitamin D (ERGOCALCIFEROL) 1.25 MG (97530 UT) capsule capsule Take 1 capsule by mouth 1 (One) Time Per Week. (Patient not taking: Reported on 8/16/2023) 12 capsule 0     No facility-administered medications prior to visit.       No opioid medication identified on active medication list. I have reviewed chart for other potential  high risk medication/s and harmful drug interactions in the elderly.        Aspirin is on active medication list. Aspirin use is indicated based on review of current medical condition/s. Pros and cons of this therapy have been discussed today. Benefits of this medication outweigh potential harm.  Patient has been encouraged to continue taking this medication.  .      Patient Active Problem List   Diagnosis    Chronic coronary artery disease    Hypertension    Old myocardial infarction    Asymptomatic varicose veins of lower extremity    Benign prostatic hyperplasia    Hyperglycemia    Leg edema    Obesity with body mass index 30 or greater     Advance Care Planning   Advance Care Planning     Advance Directive is not on file.  ACP discussion was declined by the patient. Patient has an advance directive (not in EMR), copy requested.     Objective    Vitals:    12/14/23 1103   BP: 118/60   BP Location: Left arm   Patient Position: Sitting   Cuff Size: Adult   Pulse: 54   Resp: 18   SpO2: 98%   Weight: 93.9 kg (207 lb)   Height: 167.4 cm (65.91\")     Estimated body mass index is 33.5 kg/m² " "as calculated from the following:    Height as of this encounter: 167.4 cm (65.91\").    Weight as of this encounter: 93.9 kg (207 lb).    BMI is >= 30 and <35. (Class 1 Obesity). The following options were offered after discussion;: exercise counseling/recommendations and nutrition counseling/recommendations    Does the patient have evidence of cognitive impairment?   No            HEALTH RISK ASSESSMENT    Smoking Status:  Social History     Tobacco Use   Smoking Status Former    Packs/day: 1.50    Years: 5.00    Additional pack years: 0.00    Total pack years: 7.50    Types: Cigarettes    Start date: 10/10/1964    Quit date: 12/15/1969    Years since quittin.0    Passive exposure: Past   Smokeless Tobacco Never     Alcohol Consumption:  Social History     Substance and Sexual Activity   Alcohol Use Not Currently    Comment: occasional, only     Fall Risk Screen:    RAGHAVENDRA Fall Risk Assessment was completed, and patient is at LOW risk for falls.Assessment completed on:2023    Depression Screenin/16/2023     1:46 PM   PHQ-2/PHQ-9 Depression Screening   Little Interest or Pleasure in Doing Things 0-->not at all   Feeling Down, Depressed or Hopeless 0-->not at all   PHQ-9: Brief Depression Severity Measure Score 0       Health Habits and Functional and Cognitive Screenin/14/2023    11:05 AM   Functional & Cognitive Status   Do you have difficulty preparing food and eating? No   Do you have difficulty bathing yourself, getting dressed or grooming yourself? No   Do you have difficulty using the toilet? No   Do you have difficulty moving around from place to place? No   Do you have trouble with steps or getting out of a bed or a chair? No   Current Diet Well Balanced Diet   Dental Exam Up to date   Eye Exam Up to date   Exercise (times per week) 3 times per week   Current Exercises Include Walking;Treadmill   Do you need help using the phone?  No   Are you deaf or do you have serious " difficulty hearing?  No   Do you need help to go to places out of walking distance? No   Do you need help shopping? No   Do you need help preparing meals?  No   Do you need help with housework?  No   Do you need help with laundry? No   Do you need help taking your medications? No   Do you need help managing money? No   Do you ever drive or ride in a car without wearing a seat belt? No   Have you felt unusual stress, anger or loneliness in the last month? No   Who do you live with? Spouse   If you need help, do you have trouble finding someone available to you? No   Have you been bothered in the last four weeks by sexual problems? No   Do you have difficulty concentrating, remembering or making decisions? No       Age-appropriate Screening Schedule:  Refer to the list below for future screening recommendations based on patient's age, sex and/or medical conditions. Orders for these recommended tests are listed in the plan section. The patient has been provided with a written plan.    Health Maintenance   Topic Date Due    TDAP/TD VACCINES (1 - Tdap) Never done    ZOSTER VACCINE (1 of 2) Never done    COVID-19 Vaccine (1) 12/16/2023 (Originally 2/15/1936)    Pneumococcal Vaccine 65+ (1 - PCV) 08/16/2024 (Originally 8/15/1941)    LIPID PANEL  03/07/2024    ANNUAL WELLNESS VISIT  12/14/2024    BMI FOLLOWUP  12/14/2024    INFLUENZA VACCINE  Completed                  CMS Preventative Services Quick Reference  Risk Factors Identified During Encounter:    Chronic Pain: Orthopedics Referral Ordered  Hearing Problem:  has a hearing aid with routine follow up with audiology    The above risks/problems have been discussed with the patient.  Pertinent information has been shared with the patient in the After Visit Summary.    Diagnoses and all orders for this visit:    1. Medicare annual wellness visit, subsequent (Primary)  -     Comprehensive Metabolic Panel; Future  -     CBC (No Diff); Future  -     Urinalysis With Culture  If Indicated -; Future    2. Vitamin D deficiency  -     Vitamin D,25-Hydroxy; Future    3. Chronic pain of left knee  -     Ambulatory Referral to Orthopedic Surgery    Continue current dose of     Follow Up:   Next Medicare Wellness visit to be scheduled in 1 year.      An After Visit Summary and PPPS were made available to the patient.

## 2023-12-15 ENCOUNTER — TELEPHONE (OUTPATIENT)
Dept: FAMILY MEDICINE CLINIC | Facility: CLINIC | Age: 88
End: 2023-12-15
Payer: MEDICARE

## 2023-12-15 LAB
BILIRUB UR QL STRIP: NEGATIVE
CLARITY UR: CLEAR
COLOR UR: YELLOW
GLUCOSE UR STRIP-MCNC: NEGATIVE MG/DL
HGB UR QL STRIP.AUTO: NEGATIVE
HOLD SPECIMEN: NORMAL
KETONES UR QL STRIP: NEGATIVE
LEUKOCYTE ESTERASE UR QL STRIP.AUTO: NEGATIVE
NITRITE UR QL STRIP: NEGATIVE
PH UR STRIP.AUTO: 5.5 [PH] (ref 5–8)
PROT UR QL STRIP: NEGATIVE
SP GR UR STRIP: 1.03 (ref 1–1.03)
UROBILINOGEN UR QL STRIP: NORMAL

## 2023-12-15 NOTE — TELEPHONE ENCOUNTER
Spoke to patient's wife, Carrol, relayed results and all recommendations.  Appt scheduled to come back on 12/28/23 for repeat H&H, orders already in.  She stated understanding and had no further questions.

## 2023-12-15 NOTE — TELEPHONE ENCOUNTER
----- Message from PIERRE Love sent at 12/15/2023  7:48 AM EST -----  Please call with results - thank you  Vitamin D is 25.6 which is still a little bit low, normal is .  He can add a daily dose of vitamin D3 1000 IU which is available over-the-counter.  On the metabolic panel his blood sugar was a little bit high at 120, probably from the cookies he had for breakfast, the rest of the metabolic panel looks fine (kidney function liver function and electrolytes).  Blood counts are okay as well, hemoglobin is very slightly decreased would like to repeat a hemoglobin and hematocrit lab test at his convenience in a week to 2 weeks.  Urinalysis is normal.

## 2023-12-28 ENCOUNTER — LAB (OUTPATIENT)
Dept: FAMILY MEDICINE CLINIC | Facility: CLINIC | Age: 88
End: 2023-12-28
Payer: MEDICARE

## 2023-12-28 DIAGNOSIS — R71.0 DECREASED HEMOGLOBIN: ICD-10-CM

## 2023-12-28 LAB
HCT VFR BLD AUTO: 40.7 % (ref 37.5–51)
HGB BLD-MCNC: 13.2 G/DL (ref 13–17.7)

## 2023-12-28 PROCEDURE — 85018 HEMOGLOBIN: CPT | Performed by: NURSE PRACTITIONER

## 2023-12-28 PROCEDURE — 36415 COLL VENOUS BLD VENIPUNCTURE: CPT

## 2023-12-28 PROCEDURE — 85014 HEMATOCRIT: CPT | Performed by: NURSE PRACTITIONER

## 2023-12-29 ENCOUNTER — TELEPHONE (OUTPATIENT)
Dept: FAMILY MEDICINE CLINIC | Facility: CLINIC | Age: 88
End: 2023-12-29
Payer: MEDICARE

## 2023-12-29 ENCOUNTER — OFFICE VISIT (OUTPATIENT)
Dept: ORTHOPEDIC SURGERY | Facility: CLINIC | Age: 88
End: 2023-12-29
Payer: MEDICARE

## 2023-12-29 VITALS — WEIGHT: 207 LBS | HEART RATE: 47 BPM | OXYGEN SATURATION: 97 % | HEIGHT: 66 IN | BODY MASS INDEX: 33.27 KG/M2

## 2023-12-29 DIAGNOSIS — M25.562 LEFT KNEE PAIN, UNSPECIFIED CHRONICITY: Primary | ICD-10-CM

## 2023-12-29 PROCEDURE — 1159F MED LIST DOCD IN RCRD: CPT | Performed by: FAMILY MEDICINE

## 2023-12-29 PROCEDURE — 1160F RVW MEDS BY RX/DR IN RCRD: CPT | Performed by: FAMILY MEDICINE

## 2023-12-29 PROCEDURE — 99203 OFFICE O/P NEW LOW 30 MIN: CPT | Performed by: FAMILY MEDICINE

## 2023-12-29 NOTE — PROGRESS NOTES
Primary Care Sports Medicine Office Visit Note     Patient ID: Royal Beckman is a 88 y.o. male.    Chief Complaint:  Chief Complaint   Patient presents with    Left Knee - Pain, Initial Evaluation     HPI:    Mr. Royal Beckman is a 88 y.o. male. The patient presents to the clinic today for left knee pain. He is accompanied by an adult female.    The adult female reports that the patient has a high tolerance for pain. The patient's left knee has been bothering him and he has been favoring it for approximately 1 year. He notes mild edema in his left knee. He denies giving way. He applies diclofenac gel for his left knee pain. He has not tried glucosamine-chondroitin. He does not believe he has received a steroid injection in the past. He attends the [facility] 3 times weekly and uses the treadmill.    He has been receiving venous injections in his bilateral lower extremities.     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    Hyperglycemia     Hyperlipidemia     Hypertension     Low back pain 6 to 8 months ago    Myocardial infarction     Peripheral neuropathy don't remember       Past Surgical History:   Procedure Laterality Date    CARDIAC CATHETERIZATION      COLONOSCOPY      CORONARY ARTERY BYPASS GRAFT         Family History   Problem Relation Age of Onset    Aneurysm Father     Heart disease Brother      Social History     Occupational History    Not on file   Tobacco Use    Smoking status: Former     Packs/day: 1.50     Years: 5.00     Additional pack years: 0.00     Total pack years: 7.50     Types: Cigarettes     Start date: 10/10/1964     Quit date: 12/15/1969     Years since quittin.0     Passive exposure: Past    Smokeless tobacco: Never   Vaping Use    Vaping Use: Never used   Substance and Sexual Activity    Alcohol use: Not Currently     Comment: occasional, only    Drug use: No  "   Sexual activity: Not Currently     Partners: Female      Review of Systems   Constitutional:  Negative for activity change, fatigue and fever.   Musculoskeletal:  Positive for arthralgias.   Skin:  Negative for color change and rash.   Neurological:  Negative for numbness.     Objective:    Pulse (!) 47   Ht 167.4 cm (65.91\")   Wt 93.9 kg (207 lb)   SpO2 97%   BMI 33.50 kg/m²     Physical Examination:  Physical Exam  Vitals and nursing note reviewed.   Constitutional:       General: He is not in acute distress.     Appearance: He is well-developed. He is not diaphoretic.   HENT:      Head: Normocephalic and atraumatic.   Eyes:      Conjunctiva/sclera: Conjunctivae normal.   Pulmonary:      Effort: Pulmonary effort is normal. No respiratory distress.   Musculoskeletal:      Left knee: No effusion.      Instability Tests: Medial Danielle test negative and lateral Danielle test negative.   Skin:     General: Skin is warm.      Capillary Refill: Capillary refill takes less than 2 seconds.   Neurological:      Mental Status: He is alert.       Left Ankle Exam     Range of Motion   The patient has normal left ankle ROM.       Left Knee Exam     Muscle Strength   The patient has normal left knee strength.    Tenderness   The patient is experiencing tenderness in the lateral joint line, medial joint line and patella.    Range of Motion   Extension:  normal   Flexion:  normal     Tests   Danielle:  Medial - negative Lateral - negative  Varus: negative Valgus: negative  Left knee patellar apprehension test: +patellar grind testing, +peewee banda testing.    Other   Erythema: absent  Sensation: normal  Pulse: present (distal to the knee, DP and PT palpable)  Swelling: none  Effusion: no effusion present        Imaging and other tests:  Three view x-ray bilateral knees today shows mild near symmetric tricompartmental osteoarthritis, worsening in the lateral compartment of the left knee.    Assessment and Plan:    1. " Left knee pain, unspecified chronicity  - XR Knee 3 View Left    2. Primary osteoarthritis of the left knee    After discussion of pathology, I recommended we take a conservative approach as this is early in the disease course.  I would like to see this patient attempt weight loss as every 1 pound lost will help take 4 pounds of pressure off the knees.  I also recommended icing 3 times daily as needed after activity.  The patient can try glucosamine/chondroitin supplementation for knee fluidity.  I would also like to see this patient exercise 20 to 30 minutes a day 3 days a week.  Working on quadriceps strengthening.  RTC in 3 to 6 months if no improvement, or sooner if symptoms worsen.  We can always consider corticosteroid injection at that time if needed.     Transcribed from ambient dictation for Kvng Ruvalcaba II,  by Luiza Serrano.  12/29/23   14:20 EST    Patient or patient representative verbalized consent to the visit recording.  I have personally performed the services described in this document as transcribed by the above individual, and it is both accurate and complete.    Disclaimer: Please note that areas of this note were completed with computer voice recognition software.  Quite often unanticipated grammatical, syntax, homophones, and other interpretive errors are inadvertently transcribed by the computer software. Please excuse any errors that have escaped final proofreading.

## 2023-12-29 NOTE — TELEPHONE ENCOUNTER
No answer  Hub to read  Please let the patient know that his hemoglobin is better than a few weeks ago. No acute concerns.

## 2023-12-29 NOTE — TELEPHONE ENCOUNTER
----- Message from Cuong Gonzalez MD sent at 12/29/2023 10:36 AM EST -----  Please let the patient know that his hemoglobin is better than a few weeks ago. No acute concerns.

## 2024-01-04 ENCOUNTER — LAB (OUTPATIENT)
Dept: LAB | Facility: HOSPITAL | Age: 89
End: 2024-01-04
Payer: MEDICARE

## 2024-01-04 DIAGNOSIS — R19.7 DIARRHEA, UNSPECIFIED TYPE: ICD-10-CM

## 2024-01-04 LAB
ADV 40+41 DNA STL QL NAA+NON-PROBE: NOT DETECTED
ASTRO TYP 1-8 RNA STL QL NAA+NON-PROBE: DETECTED
C CAYETANENSIS DNA STL QL NAA+NON-PROBE: NOT DETECTED
C COLI+JEJ+UPSA DNA STL QL NAA+NON-PROBE: NOT DETECTED
CRYPTOSP DNA STL QL NAA+NON-PROBE: NOT DETECTED
E HISTOLYT DNA STL QL NAA+NON-PROBE: NOT DETECTED
EAEC PAA PLAS AGGR+AATA ST NAA+NON-PRB: NOT DETECTED
EC STX1+STX2 GENES STL QL NAA+NON-PROBE: NOT DETECTED
EPEC EAE GENE STL QL NAA+NON-PROBE: NOT DETECTED
ETEC LTA+ST1A+ST1B TOX ST NAA+NON-PROBE: NOT DETECTED
G LAMBLIA DNA STL QL NAA+NON-PROBE: NOT DETECTED
NOROVIRUS GI+II RNA STL QL NAA+NON-PROBE: NOT DETECTED
P SHIGELLOIDES DNA STL QL NAA+NON-PROBE: NOT DETECTED
RVA RNA STL QL NAA+NON-PROBE: NOT DETECTED
S ENT+BONG DNA STL QL NAA+NON-PROBE: NOT DETECTED
SAPO I+II+IV+V RNA STL QL NAA+NON-PROBE: NOT DETECTED
SHIGELLA SP+EIEC IPAH ST NAA+NON-PROBE: NOT DETECTED
V CHOL+PARA+VUL DNA STL QL NAA+NON-PROBE: NOT DETECTED
V CHOLERAE DNA STL QL NAA+NON-PROBE: NOT DETECTED
Y ENTEROCOL DNA STL QL NAA+NON-PROBE: NOT DETECTED

## 2024-01-04 PROCEDURE — 87324 CLOSTRIDIUM AG IA: CPT

## 2024-01-04 PROCEDURE — 87507 IADNA-DNA/RNA PROBE TQ 12-25: CPT

## 2024-01-04 PROCEDURE — 87449 NOS EACH ORGANISM AG IA: CPT

## 2024-01-05 LAB
C DIFF GDH + TOXINS A+B STL QL IA.RAPID: NEGATIVE
C DIFF GDH + TOXINS A+B STL QL IA.RAPID: NEGATIVE

## 2024-02-19 ENCOUNTER — TELEPHONE (OUTPATIENT)
Dept: ORTHOPEDIC SURGERY | Facility: CLINIC | Age: 89
End: 2024-02-19
Payer: MEDICARE

## 2024-02-19 NOTE — TELEPHONE ENCOUNTER
Caller: JANET MENDOZA    Relationship to patient: Emergency Contact    Best call back number: [phone] (home)     Patient is needing: PATIENTS WIFE CALLED STATING THAT THE PATIENT IS WANTING TO TRY CORTISONE INJECTIONS IN HIS LEFT KNEE THAT HE HAS SEEN DR SANTANA FOR. PATIENT HAS NOT HAD ANY INJECTIONS SO FAR. PLEASE GIVE A CALL BACK TO THE PATIENT TO SCHEDULE. THE WIFE IS NOT ON A BH VERBAL RELEASE.   PATIENTS WIFE STATED PLEASE BE AWARE THAT PATIENT HAS SOMEWHAT OF DEMENTIA. THEY ALSO SCREEN CALLS AND IF YOU DON'T ANNOUNCE THAT YOU ARE Zoroastrian THEY WILL HANG UP.

## 2024-02-26 ENCOUNTER — OFFICE VISIT (OUTPATIENT)
Dept: ORTHOPEDIC SURGERY | Facility: CLINIC | Age: 89
End: 2024-02-26
Payer: MEDICARE

## 2024-02-26 VITALS — HEIGHT: 66 IN | BODY MASS INDEX: 31.66 KG/M2 | HEART RATE: 55 BPM | OXYGEN SATURATION: 96 % | WEIGHT: 197 LBS

## 2024-02-26 DIAGNOSIS — M17.12 PRIMARY OSTEOARTHRITIS OF LEFT KNEE: Primary | ICD-10-CM

## 2024-02-26 PROCEDURE — 1159F MED LIST DOCD IN RCRD: CPT | Performed by: FAMILY MEDICINE

## 2024-02-26 PROCEDURE — 20610 DRAIN/INJ JOINT/BURSA W/O US: CPT | Performed by: FAMILY MEDICINE

## 2024-02-26 PROCEDURE — 1160F RVW MEDS BY RX/DR IN RCRD: CPT | Performed by: FAMILY MEDICINE

## 2024-02-26 RX ORDER — TRIAMCINOLONE ACETONIDE 40 MG/ML
80 INJECTION, SUSPENSION INTRA-ARTICULAR; INTRAMUSCULAR
Status: COMPLETED | OUTPATIENT
Start: 2024-02-26 | End: 2024-02-26

## 2024-02-26 RX ADMIN — TRIAMCINOLONE ACETONIDE 80 MG: 40 INJECTION, SUSPENSION INTRA-ARTICULAR; INTRAMUSCULAR at 15:23

## 2024-02-26 NOTE — PROGRESS NOTES
Primary Care Sports Medicine Office Visit Note     Patient ID: Royal Beckman is a 88 y.o. male.    Chief Complaint:  Chief Complaint   Patient presents with    Left Knee - Pain, Follow-up     HPI:    Mr. Royal Beckman is a 88 y.o. male. The patient presents to the clinic today for follow up evaluation of left knee pain. An adult female accompanies him.    He has been getting injections in his veins in his bilateral lower extremities by Dr. Guzman. He has 1 more appointment left in 2 months. His knee is still bothering him; however, it is not as bad as it was. He did not get an injection when he was here. He did not take the anti-inflammatory medication that was prescribed last time. He quit using the treadmill and has gone bicycling.    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    Hyperglycemia     Hyperlipidemia     Hypertension     Knee swelling wiyhin the past several months    Low back pain 6 to 8 months ago    Myocardial infarction     Peripheral neuropathy don't remember       Past Surgical History:   Procedure Laterality Date    CARDIAC CATHETERIZATION      COLONOSCOPY      CORONARY ARTERY BYPASS GRAFT         Family History   Problem Relation Age of Onset    Aneurysm Father     Heart disease Brother      Social History     Occupational History    Not on file   Tobacco Use    Smoking status: Former     Packs/day: 1.50     Years: 5.00     Additional pack years: 0.00     Total pack years: 7.50     Types: Cigarettes     Start date: 10/10/1964     Quit date: 12/15/1969     Years since quittin.2     Passive exposure: Past    Smokeless tobacco: Never   Vaping Use    Vaping Use: Never used   Substance and Sexual Activity    Alcohol use: Yes     Comment: occasional, only    Drug use: No    Sexual activity: Not Currently     Partners: Female      Review of Systems   Constitutional:   "Negative for activity change, fatigue and fever.   Musculoskeletal:  Positive for arthralgias.   Skin:  Negative for color change and rash.   Neurological:  Negative for numbness.     Objective:    Pulse 55   Ht 167.6 cm (66\")   Wt 89.4 kg (197 lb)   SpO2 96%   BMI 31.80 kg/m²     Physical Examination:  Physical Exam  Vitals and nursing note reviewed.   Constitutional:       General: He is not in acute distress.     Appearance: He is well-developed. He is not diaphoretic.   HENT:      Head: Normocephalic and atraumatic.   Eyes:      Conjunctiva/sclera: Conjunctivae normal.   Pulmonary:      Effort: Pulmonary effort is normal. No respiratory distress.   Musculoskeletal:      Left knee: No effusion.      Instability Tests: Medial Danielle test negative and lateral Danielle test negative.   Skin:     General: Skin is warm.      Capillary Refill: Capillary refill takes less than 2 seconds.   Neurological:      Mental Status: He is alert.       Left Ankle Exam     Range of Motion   The patient has normal left ankle ROM.       Left Knee Exam     Muscle Strength   The patient has normal left knee strength.    Tenderness   The patient is experiencing tenderness in the lateral joint line, medial joint line and patella.    Range of Motion   Extension:  normal   Flexion:  normal     Tests   Danielle:  Medial - negative Lateral - negative  Varus: negative Valgus: negative  Left knee patellar apprehension test: +patellar grind testing, +peewee banda testing.    Other   Erythema: absent  Sensation: normal  Pulse: present (distal to the knee, DP and PT palpable)  Swelling: none  Effusion: no effusion present        Imaging and other tests:  No new imaging today.    Assessment and Plan:    1. Primary osteoarthritis of the left knee    After discussion of risks and benefits, the patient elected to proceed with corticosteroid injection to the left knee.  The patient tolerated this procedure well without any complaints or " problems.  I recommended continuation of conservative management as previous, RTC in 3-6 months or sooner if symptoms recur.     Transcribed from ambient dictation for Kvng Ruvalcaba II,  by Neha Silva.  02/26/24   14:06 EST    Patient or patient representative verbalized consent to the visit recording.  I have personally performed the services described in this document as transcribed by the above individual, and it is both accurate and complete.    Disclaimer: Please note that areas of this note were completed with computer voice recognition software.  Quite often unanticipated grammatical, syntax, homophones, and other interpretive errors are inadvertently transcribed by the computer software. Please excuse any errors that have escaped final proofreading.

## 2024-02-26 NOTE — PROGRESS NOTES
Procedure   Large Joint Arthrocentesis: L knee  Date/Time: 2/26/2024 3:23 PM  Consent given by: patient  Site marked: site marked  Timeout: Immediately prior to procedure a time out was called to verify the correct patient, procedure, equipment, support staff and site/side marked as required   Supporting Documentation  Indications: pain   Procedure Details  Location: knee - L knee  Preparation: Patient was prepped and draped in the usual sterile fashion  Needle size: 25 G  Approach: anteromedial  Medications administered: 80 mg triamcinolone acetonide 40 MG/ML (2cc of 1% lidocaine without epinepherine)  Patient tolerance: patient tolerated the procedure well with no immediate complications (Blood loss negligable, pt admits to immediate decrease in pain and improved ROM with gentle ambulation post injection.)

## 2024-02-29 ENCOUNTER — LAB (OUTPATIENT)
Dept: LAB | Facility: HOSPITAL | Age: 89
End: 2024-02-29
Payer: MEDICARE

## 2024-02-29 DIAGNOSIS — I10 ESSENTIAL HYPERTENSION: ICD-10-CM

## 2024-02-29 DIAGNOSIS — I25.810 CORONARY ARTERY DISEASE INVOLVING CORONARY BYPASS GRAFT OF NATIVE HEART WITHOUT ANGINA PECTORIS: ICD-10-CM

## 2024-02-29 DIAGNOSIS — I49.3 PVC (PREMATURE VENTRICULAR CONTRACTION): ICD-10-CM

## 2024-02-29 DIAGNOSIS — R00.1 BRADYCARDIA: ICD-10-CM

## 2024-02-29 DIAGNOSIS — E78.5 DYSLIPIDEMIA: ICD-10-CM

## 2024-02-29 DIAGNOSIS — E66.9 OBESITY (BMI 30-39.9): ICD-10-CM

## 2024-02-29 LAB
ALBUMIN SERPL-MCNC: 4.2 G/DL (ref 3.5–5.2)
ALBUMIN/GLOB SERPL: 1.6 G/DL
ALP SERPL-CCNC: 89 U/L (ref 39–117)
ALT SERPL W P-5'-P-CCNC: 10 U/L (ref 1–41)
ANION GAP SERPL CALCULATED.3IONS-SCNC: 9 MMOL/L (ref 5–15)
AST SERPL-CCNC: 15 U/L (ref 1–40)
BILIRUB SERPL-MCNC: 0.6 MG/DL (ref 0–1.2)
BUN SERPL-MCNC: 18 MG/DL (ref 8–23)
BUN/CREAT SERPL: 22.5 (ref 7–25)
CALCIUM SPEC-SCNC: 9.5 MG/DL (ref 8.6–10.5)
CHLORIDE SERPL-SCNC: 108 MMOL/L (ref 98–107)
CHOLEST SERPL-MCNC: 180 MG/DL (ref 0–200)
CO2 SERPL-SCNC: 26 MMOL/L (ref 22–29)
CREAT SERPL-MCNC: 0.8 MG/DL (ref 0.76–1.27)
EGFRCR SERPLBLD CKD-EPI 2021: 86.2 ML/MIN/1.73
GLOBULIN UR ELPH-MCNC: 2.7 GM/DL
GLUCOSE SERPL-MCNC: 95 MG/DL (ref 65–99)
HDLC SERPL-MCNC: 47 MG/DL (ref 40–60)
LDLC SERPL CALC-MCNC: 115 MG/DL (ref 0–100)
LDLC/HDLC SERPL: 2.41 {RATIO}
POTASSIUM SERPL-SCNC: 3.9 MMOL/L (ref 3.5–5.2)
PROT SERPL-MCNC: 6.9 G/DL (ref 6–8.5)
SODIUM SERPL-SCNC: 143 MMOL/L (ref 136–145)
TRIGL SERPL-MCNC: 98 MG/DL (ref 0–150)
VLDLC SERPL-MCNC: 18 MG/DL (ref 5–40)

## 2024-02-29 PROCEDURE — 80053 COMPREHEN METABOLIC PANEL: CPT

## 2024-02-29 PROCEDURE — 36415 COLL VENOUS BLD VENIPUNCTURE: CPT

## 2024-02-29 PROCEDURE — 80061 LIPID PANEL: CPT

## 2024-03-18 RX ORDER — SIMVASTATIN 20 MG
TABLET ORAL
Qty: 90 TABLET | Refills: 0 | Status: SHIPPED | OUTPATIENT
Start: 2024-03-18

## 2024-03-18 NOTE — TELEPHONE ENCOUNTER
Rx Refill Note  Requested Prescriptions     Pending Prescriptions Disp Refills    simvastatin (ZOCOR) 20 MG tablet [Pharmacy Med Name: SIMVASTATIN 20MG] 90 tablet 1     Sig: TAKE ONE TABLET BY MOUTH EVERY DAY      Last office visit with prescribing clinician: 3/9/2023   Last telemedicine visit with prescribing clinician: Visit date not found   Next office visit with prescribing clinician: 4/8/2024                         Would you like a call back once the refill request has been completed: [] Yes [] No    If the office needs to give you a call back, can they leave a voicemail: [] Yes [] No    Iman Sin MA  03/18/24, 10:53 EDT

## 2024-04-07 NOTE — PROGRESS NOTES
Subjective:     Encounter Date:03/11/2024      Patient ID: Royal Beckman is a 88 y.o. male.    Chief Complaint and history of present illness:     Follow-up for CAD, MI, CABG, bradycardia, dyslipidemia     History of Present Illness          Mr. Royal Beckman i has PMH of     -CAD,NSTEMI, CABG 2001 with LIMA to LAD, SVG to D1, SVG to OM, SVG to PDA  -Bradycardia  -Dyslipidemia  -Hyperglycemia, obesity  -Anemia, thrombocytopenia  -Strep cellulitis of lower extremities following CABG  -Former smoker quit 1977     Here for follow-up.  Patient patient has occasional twinges of chest pain.     Patient's arterial blood pressure is 1[birth year]24/1964, heart rate 48, O2 sat of 94% on room air.  BMI is over 30.     Patient had labs from 7/23/2019 reveal cholesterol 167 triglycerides 115 HDL low at 31 .  CMP with low albumin at 3.3.  Labs from 7/27/2021 reveal normal CK and CMP, glucose 100, cholesterol 153, triglycerides 84, HDL 40, LDL 97.  Labs from 3/1/2022 reveal CMP normal.  Lipid profile with cholesterol 168 triglycerides 99 HDL 41 .  TSH normal.  Labs from 2/29/2024 reveal lipid profile with cholesterol 180, triglycerides 98, HDL 47,      Patient's heart rate is low and has PVCs but he states that these are normal for him all his life.  Patient did not have symptoms of chest pain before his   CABG.        Assessment :    -PVCs  -Bradycardia  -CAD, CABG  -Dyslipidemia  -Hypertension  -Obesity with BMI over 30     Plan :     Patient's cholesterol is not to goal will change simvastatin to atorvastatin  Continue aspirin atenolol and atorvastatin to help with PVCs, CAD, CABG, hypertension, dyslipidemia as tolerated.    Reviewed BMI over 30, counseled on weight loss diet and exercise  We will follow-up in 1 year or sooner if needed.  Reviewed low HDL counseled on walking exercise.    [facility] or shortness of breath walks an hour.                        ECG 12 Lead     Date/Time: 3/9/2023 2:57 PM  Performed by: Sanjiv Bae MD  Authorized by: Sanjiv Bae MD   Comparison: compared with previous ECG from 3/8/2022  Comparison to previous ECG: EKG done today reviewed/interpreted by me reveals sinus bradycardia at the rate of 45 bpm with nonspecific ST-T changes and baseline artifact.  No new change compared EKG from 3/8/2022                    ECG 12 Lead    Date/Time: 4/8/2024 4:44 PM  Performed by: Sanjiv Bea MD    Authorized by: Sanjiv Bae MD  Comparison: compared with previous ECG from 3/9/2023  Comparison to previous ECG: EKG done today reviewed/interpreted by me reveals sinus rhythm with a rate of 60 bpm with moderate intraventricular conduction delay, nonspecific ST-T flattening.  No significant change compared EKG from 3/9/2023          Copied text in this portion of the note has been reviewed and is accurate as of 4/8/2024  The following portions of the patient's history were reviewed and updated as appropriate: allergies, current medications, past family history, past medical history, past social history, past surgical history and problem list.    Assessment:         MDM       Diagnosis Plan   1. Bradycardia  ECG 12 Lead      2. PVC (premature ventricular contraction)  ECG 12 Lead      3. Essential hypertension  ECG 12 Lead      4. Dyslipidemia  ECG 12 Lead      5. Coronary artery disease involving coronary bypass graft of native heart without angina pectoris  ECG 12 Lead      6. Obesity (BMI 30-39.9)  ECG 12 Lead             Plan:               Past Medical History:  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    Hyperglycemia     Hyperlipidemia     Hypertension     Knee swelling wiyhin the past several months    Low back pain 6 to 8 months ago    Myocardial infarction      Peripheral neuropathy don't remember     Past Surgical History:  Past Surgical History:   Procedure Laterality Date    CARDIAC CATHETERIZATION      COLONOSCOPY      CORONARY ARTERY BYPASS GRAFT        Allergies:  No Known Allergies  Home Meds:  Current Meds:     Current Outpatient Medications:     amLODIPine (NORVASC) 10 MG tablet, TAKE ONE TABLET BY MOUTH EVERY DAY, Disp: 90 tablet, Rfl: 3    aspirin 81 MG tablet, ASPIRIN 81 MG ORAL TABLET, Disp: , Rfl:     atenolol (TENORMIN) 25 MG tablet, TAKE 1/2 TABLET BY MOUTH EVERY DAY, Disp: 45 tablet, Rfl: 2    Cyanocobalamin (B-12) 1000 MCG capsule, B-12 1000 MCG CAPS, Disp: , Rfl:     Diclofenac Sodium (VOLTAREN) 1 % gel gel, Apply 4 g topically to the appropriate area as directed 3 (Three) Times a Day As Needed (pain)., Disp: 100 g, Rfl: 0    donepezil (Aricept) 10 MG tablet, Take 1 tablet by mouth 2 (Two) Times a Day., Disp: 180 tablet, Rfl: 3    niacin 500 MG tablet, NIACIN 500 MG TABS, Disp: , Rfl:     Omega-3 Fatty Acids (CVS FISH OIL) 1200 MG capsule, CVS FISH OIL 1200 MG CAPS, Disp: , Rfl:     atorvastatin (LIPITOR) 20 MG tablet, Take 1 tablet by mouth Daily., Disp: 90 tablet, Rfl: 3  Social History:   Social History     Tobacco Use    Smoking status: Former     Current packs/day: 0.00     Average packs/day: 1.5 packs/day for 5.2 years (7.8 ttl pk-yrs)     Types: Cigarettes     Start date: 10/10/1964     Quit date: 12/15/1969     Years since quittin.3     Passive exposure: Past    Smokeless tobacco: Never   Substance Use Topics    Alcohol use: Yes     Comment: occasional, only      Family History:  Family History   Problem Relation Age of Onset    Aneurysm Father     Heart disease Brother         he passe away several tears ago              Review of Systems   Constitutional: Positive for malaise/fatigue.   Cardiovascular:  Positive for chest pain, dyspnea on exertion and leg swelling. Negative for palpitations.   Respiratory:  Positive for shortness of  "breath. Negative for cough.    Gastrointestinal:  Negative for abdominal pain, nausea and vomiting.   Neurological:  Negative for dizziness, focal weakness, headaches, light-headedness and numbness.   All other systems reviewed and are negative.    All other systems are negative         Objective:     Physical Exam  /66 (BP Location: Left arm, Patient Position: Sitting, Cuff Size: Adult)   Pulse (!) 48   Ht 162.6 cm (64\")   Wt 88 kg (194 lb)   SpO2 94%   BMI 33.30 kg/m²   General:  Appears in no acute distress  Eyes: Sclera is anicteric,  conjunctiva is clear   HEENT:  No JVD.  No carotid bruits  Respiratory: Respirations regular and unlabored at rest.  Clear to auscultation  Cardiovascular: S1,S2 Regular rate and rhythm. .   Extremities: No digital clubbing or cyanosis, no edema  Skin: Color pink. Skin warm and dry to touch. No rashes  No xanthoma  Neuro: Alert and awake.    Lab Reviewed:         Sanjiv Bae MD  4/8/2024 16:45 EDT      EMR Dragon/Transcription:   \"Dictated utilizing Dragon dictation\".        "

## 2024-04-08 ENCOUNTER — OFFICE VISIT (OUTPATIENT)
Dept: CARDIOLOGY | Facility: CLINIC | Age: 89
End: 2024-04-08
Payer: MEDICARE

## 2024-04-08 VITALS
WEIGHT: 194 LBS | SYSTOLIC BLOOD PRESSURE: 125 MMHG | DIASTOLIC BLOOD PRESSURE: 66 MMHG | OXYGEN SATURATION: 94 % | HEIGHT: 64 IN | BODY MASS INDEX: 33.12 KG/M2 | HEART RATE: 48 BPM

## 2024-04-08 DIAGNOSIS — I49.3 PVC (PREMATURE VENTRICULAR CONTRACTION): ICD-10-CM

## 2024-04-08 DIAGNOSIS — E78.5 DYSLIPIDEMIA: ICD-10-CM

## 2024-04-08 DIAGNOSIS — R00.1 BRADYCARDIA: Primary | ICD-10-CM

## 2024-04-08 DIAGNOSIS — I10 ESSENTIAL HYPERTENSION: ICD-10-CM

## 2024-04-08 DIAGNOSIS — E66.9 OBESITY (BMI 30-39.9): ICD-10-CM

## 2024-04-08 DIAGNOSIS — I25.810 CORONARY ARTERY DISEASE INVOLVING CORONARY BYPASS GRAFT OF NATIVE HEART WITHOUT ANGINA PECTORIS: ICD-10-CM

## 2024-04-08 PROCEDURE — 93000 ELECTROCARDIOGRAM COMPLETE: CPT | Performed by: INTERNAL MEDICINE

## 2024-04-08 PROCEDURE — 99214 OFFICE O/P EST MOD 30 MIN: CPT | Performed by: INTERNAL MEDICINE

## 2024-04-08 PROCEDURE — 1159F MED LIST DOCD IN RCRD: CPT | Performed by: INTERNAL MEDICINE

## 2024-04-08 PROCEDURE — 1160F RVW MEDS BY RX/DR IN RCRD: CPT | Performed by: INTERNAL MEDICINE

## 2024-04-08 RX ORDER — ATORVASTATIN CALCIUM 20 MG/1
20 TABLET, FILM COATED ORAL DAILY
Qty: 90 TABLET | Refills: 3 | Status: SHIPPED | OUTPATIENT
Start: 2024-04-08

## 2024-05-28 RX ORDER — ATENOLOL 25 MG/1
TABLET ORAL
Qty: 45 TABLET | Refills: 3 | Status: SHIPPED | OUTPATIENT
Start: 2024-05-28

## 2024-05-28 NOTE — TELEPHONE ENCOUNTER
Rx Refill Note  Requested Prescriptions     Pending Prescriptions Disp Refills    atenolol (TENORMIN) 25 MG tablet [Pharmacy Med Name: ATENOLOL 25MG] 45 tablet 1     Sig: TAKE 1/2 TABLET BY MOUTH EVERY DAY      Last office visit with prescribing clinician: 4/8/2024   Last telemedicine visit with prescribing clinician: Visit date not found   Next office visit with prescribing clinician: 4/10/2025                         Would you like a call back once the refill request has been completed: [] Yes [] No    If the office needs to give you a call back, can they leave a voicemail: [] Yes [] No    Iman Sin MA  05/28/24, 14:10 EDT

## 2024-05-30 ENCOUNTER — OFFICE VISIT (OUTPATIENT)
Dept: ORTHOPEDIC SURGERY | Facility: CLINIC | Age: 89
End: 2024-05-30
Payer: MEDICARE

## 2024-05-30 VITALS — HEIGHT: 66 IN | BODY MASS INDEX: 32.14 KG/M2 | WEIGHT: 200 LBS

## 2024-05-30 DIAGNOSIS — M17.12 PRIMARY OSTEOARTHRITIS OF LEFT KNEE: Primary | ICD-10-CM

## 2024-05-30 RX ADMIN — TRIAMCINOLONE ACETONIDE 80 MG: 40 INJECTION, SUSPENSION INTRA-ARTICULAR; INTRAMUSCULAR at 16:28

## 2024-05-30 NOTE — PROGRESS NOTES
Primary Care Sports Medicine Office Visit Note    Patient ID: Royal Beckman is a 88 y.o. male.    Chief Complaint:  Chief Complaint   Patient presents with    Left Knee - Follow-up, Pain       History of Present Illness  The patient presents for evaluation of knee pain.    The patient reports experiencing pain and swelling in the medial joint line of his knee. He has previously received injections from Dr. Cook, which provided some relief. However, it has been approximately 2 to 3 months since his last injection. He describes a sensation of stiffness and tightness in his knee, which impedes his mobility, necessitating caution during ambulation. He also mentions a recent incident where he slipped and fell while wearing shoes.       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    Hyperglycemia     Hyperlipidemia     Hypertension     Knee swelling wiyhin the past several months    Low back pain 6 to 8 months ago    Low back strain     Myocardial infarction     Neck strain     Peripheral neuropathy don't remember       Past Surgical History:   Procedure Laterality Date    CARDIAC CATHETERIZATION      COLONOSCOPY      CORONARY ARTERY BYPASS GRAFT         Family History   Problem Relation Age of Onset    Aneurysm Father     Heart disease Brother         he passe away several tears ago     Social History     Occupational History    Not on file   Tobacco Use    Smoking status: Former     Current packs/day: 0.00     Average packs/day: 1.5 packs/day for 5.2 years (7.8 ttl pk-yrs)     Types: Cigarettes     Start date: 10/10/1964     Quit date: 12/15/1969     Years since quittin.4     Passive exposure: Past    Smokeless tobacco: Never   Vaping Use    Vaping status: Never Used   Substance and Sexual Activity    Alcohol use: Yes     Comment: occasional, only    Drug use: No    Sexual activity: Not  "Currently     Partners: Female      Review of Systems  Objective:  Review of Systems:  Review of Systems   Constitutional:  Negative for activity change, fatigue and fever.   Musculoskeletal:  Positive for arthralgias.   Skin:  Negative for color change and rash.   Neurological:  Negative for numbness.     Physical Exam  Ht 167.6 cm (66\")   Wt 90.7 kg (200 lb)   BMI 32.28 kg/m²   Vitals and nursing note reviewed.   Constitutional:       General: he is not in acute distress.     Appearance: he is well-developed. He is not diaphoretic.   HENT:      Head: Normocephalic and atraumatic.   Eyes:      Conjunctiva/sclera: Conjunctivae normal.   Pulmonary:      Effort: Pulmonary effort is normal. No respiratory distress.   Skin:     General: Skin is warm.      Capillary Refill: Capillary refill takes less than 2 seconds.   Neurological:      Mental Status: he  is alert.     Left Ankle Exam     Range of Motion   The patient has normal left ankle ROM.       Left Knee Exam     Muscle Strength   The patient has normal left knee strength.    Tenderness   The patient is experiencing tenderness in the lateral joint line, medial joint line and patella.    Range of Motion   Extension:  normal   Flexion:  normal     Tests   Danielle:  Medial - negative Lateral - negative  Varus: negative Valgus: negative  Left knee patellar apprehension test: +patellar grind testing, +peewee banda testing.    Other   Erythema: absent  Sensation: normal  Pulse: present (distal to the knee, DP and PT palpable)  Swelling: none  Effusion: no effusion present            Results  No new imaging today.     Assessment and Plan:    1. Primary osteoarthritis of left knee    After discussion of risks and benefits, the patient elected to proceed with corticosteroid injection to the L knee.  The patient tolerated this procedure well without any complaints or problems.  I recommended continuation of conservative management as previous, RTC in 3-6 months or " "sooner if symptoms recur.    Kvng SCHAEFER. \"Chance\" Jordon GALVAN DO, CAQSM  05/30/24  11:46 EDT    Disclaimer: Please note that areas of this note were completed with computer voice recognition software.  Quite often unanticipated grammatical, syntax, homophones, and other interpretive errors are inadvertently transcribed by the computer software. Please excuse any errors that have escaped final proofreading.    Patient or patient representative verbalized consent for the use of Ambient Listening during the visit with  Kvng Ruvalcaba II, DO for chart documentation. 05/30/24  11:46 EDT  "

## 2024-05-31 RX ORDER — TRIAMCINOLONE ACETONIDE 40 MG/ML
80 INJECTION, SUSPENSION INTRA-ARTICULAR; INTRAMUSCULAR
Status: COMPLETED | OUTPATIENT
Start: 2024-05-30 | End: 2024-05-30

## 2024-05-31 NOTE — PROGRESS NOTES
Procedure   Large Joint Arthrocentesis: L knee  Date/Time: 5/30/2024 4:28 PM  Consent given by: patient  Site marked: site marked  Timeout: Immediately prior to procedure a time out was called to verify the correct patient, procedure, equipment, support staff and site/side marked as required   Supporting Documentation  Indications: pain   Procedure Details  Location: knee - L knee  Preparation: Patient was prepped and draped in the usual sterile fashion  Needle size: 25 G  Approach: anteromedial  Medications administered: 80 mg triamcinolone acetonide 40 MG/ML (2cc of 1% lidocaine without epinepherine)  Patient tolerance: patient tolerated the procedure well with no immediate complications (Blood loss negligable, pt admits to immediate decrease in pain and improved ROM with gentle ambulation post injection.)

## 2024-06-10 NOTE — PROGRESS NOTES
"Chief Complaint  Memory Loss    Subjective          Royal Beckman presents to [facility] for MEMORY  History of Present Illness  Patient is here to f/u on memory, he is currently taking aricept 10 mg 1 qd    Patient states his memory is worse    He is becoming more irritability, anxiety,                   Maximum   Score Patient's   Score Questions   5 4 \"What is the year?Season?Date?Day of the week?Month?\"   5 3 \"Where are we now: State?County?Town/city?Hospital?Floor?\"   3 3 3 Unrelated objects Number of trials:___   5 5 Count backward from 100 by sevens or spell WORLD backwards   3 0 Name 3 things from above   2 2 Identify 2 objects   1 0 Repeat the phrase: No ifs, ands,or buts.   3 3 Take paper in right hand, fold it in half, and put it on the floor.   1 0 Please read this and do what it says. \"Close your eyes\"   1 1 Make up and write a sentence about anything. Noun and verb   1 1 Copy this picture 10 angles must be present.   30 22 Total MMSE                ====PREV. OV 6-======  Patient is here to f/u on memory, per spouse patient has good and bad days,   Memory is some better.   he is currently taking aricept 10 mg 1 hs and reports no side effects   He will turn the wrong way at times, eventually gets to where he needs to be.   Pt has a aneurysm has seen surgeon will have repeat scan, may have procedure  Expand All Collapse All  Chief Complaint  Follow-up (MEMORY)   Subjective  Royal Beckman presents to [facility] for F/U MEMORY  History of Present Illness  Patient is here to f/u on memory, per spouse patient has good and bad days,   Memory is some better.   he is currently taking aricept 10 mg 1 hs and reports no side effects     He will turn the wrong way at times, eventually gets to where he needs to be.    Pt has a aneurysm has seen surgeon will have repeat scan, may have procedure        ===PREV. OV 12/15/22===  New patient referred by " "Edin JAY for memory Patient He c/o trouble with remembering things - \"anything and everything, it just depends\"      Has forgotten name of his daughter in law ( to son x 30 years),   having trouble remembering where places. Is putting things away in incorrect places at home  ,is becoming frustrated with trying to find words,     Forgets conversatoins from a couple of days ago and from a couple of minutes and   Forgets where he is sometimes and where he is going  .patient states memory has been worse over the past few months to 1 year.      Patient does have a family  h/o dementia (father)   He is currently not taking any medications for memory     Worked for [employer] company for 30 years.      Has cochlear implant about 2008     Maximum   Score Patient's   Score Questions   5 5 \"What is the year?Season?Date?Day of the week?Month?\"   5 5 \"Where are we now: State?County?Town/city?Hospital?Floor?\"   3 3 3 Unrelated objects Number of trials:___   5 5 Count backward from 100 by sevens or spell WORLD backwards   3 0 Name 3 things from above   2 2 Identify 2 objects   1 1 Repeat the phrase: No ifs, ands,or buts.   3 2 Take paper in right hand, fold it in half, and put it on the floor.   1 1 Please read this and do what it says. \"Close your eyes\"   1 1 Make up and write a sentence about anything. Noun and verb   1 1 Copy this picture 10 angles must be present.   30 27 Total MMS                Current Outpatient Medications:     amLODIPine (NORVASC) 10 MG tablet, TAKE ONE TABLET BY MOUTH EVERY DAY, Disp: 90 tablet, Rfl: 3    aspirin 81 MG tablet, ASPIRIN 81 MG ORAL TABLET, Disp: , Rfl:     atenolol (TENORMIN) 25 MG tablet, TAKE 1/2 TABLET BY MOUTH EVERY DAY, Disp: 45 tablet, Rfl: 3    atorvastatin (LIPITOR) 20 MG tablet, Take 1 tablet by mouth Daily., Disp: 90 tablet, Rfl: 3    Cyanocobalamin (B-12) 1000 MCG capsule, B-12 1000 MCG CAPS, Disp: , Rfl:     Diclofenac Sodium (VOLTAREN) 1 % gel gel, Apply 4 g " "topically to the appropriate area as directed 3 (Three) Times a Day As Needed (pain)., Disp: 100 g, Rfl: 0    donepezil (Aricept) 10 MG tablet, Take 1 tablet by mouth 2 (Two) Times a Day., Disp: 180 tablet, Rfl: 3    niacin 500 MG tablet, NIACIN 500 MG TABS, Disp: , Rfl:     Omega-3 Fatty Acids (CVS FISH OIL) 1200 MG capsule, CVS FISH OIL 1200 MG CAPS, Disp: , Rfl:     vitamin D3 125 MCG (5000 UT) capsule capsule, Take 1 capsule by mouth Daily., Disp: , Rfl:     memantine (NAMENDA) 10 MG tablet, One daily for one month then one bid, Disp: 60 tablet, Rfl: 11    memantine (NAMENDA) 5 MG tablet, Take 1 tablet by mouth Daily for 30 days., Disp: 30 tablet, Rfl: 0    sertraline (Zoloft) 50 MG tablet, Take 1 tablet by mouth Daily., Disp: 30 tablet, Rfl: 11    Review of Systems   Constitutional:  Positive for fatigue.   HENT:  Positive for hearing loss and sneezing.    Musculoskeletal:  Positive for neck pain. Negative for back pain.   Neurological:  Positive for dizziness and light-headedness.   Psychiatric/Behavioral:  Positive for agitation and confusion.           Objective:    Vital Signs:   /76   Pulse 56   Ht 167.6 cm (66\")   Wt 93.4 kg (206 lb)   BMI 33.25 kg/m²     Physical Exam  Vitals reviewed.   Pulmonary:      Effort: Pulmonary effort is normal. No respiratory distress.   Neurological:      Mental Status: He is alert and oriented to person, place, and time.   Psychiatric:         Mood and Affect: Mood normal.        Result Review :                Neurologic Exam     Mental Status   Oriented to person, place, and time.   Attention: normal.   Level of consciousness: alert  Knowledge: good.         Assessment and Plan    Diagnoses and all orders for this visit:    1. Memory loss  -     donepezil (Aricept) 10 MG tablet; Take 1 tablet by mouth 2 (Two) Times a Day.  Dispense: 180 tablet; Refill: 3  -     memantine (NAMENDA) 5 MG tablet; Take 1 tablet by mouth Daily for 30 days.  Dispense: 30 tablet; Refill: " 0  -     memantine (NAMENDA) 10 MG tablet; One daily for one month then one bid  Dispense: 60 tablet; Refill: 11    Other orders  -     sertraline (Zoloft) 50 MG tablet; Take 1 tablet by mouth Daily.  Dispense: 30 tablet; Refill: 11     Some decline in memory, will  Aricept to 10 mg bid, and start Namenda.       Follow Up   Return in about 1 year (around 6/11/2025).  Patient was given instructions and counseling regarding his condition or for health maintenance advice. Please see specific information pulled into the AVS if appropriate.     This document has been electronically signed by Joseph Seipel, MD on June 11, 2024 15:09 EDT

## 2024-06-11 ENCOUNTER — TELEPHONE (OUTPATIENT)
Dept: NEUROLOGY | Facility: CLINIC | Age: 89
End: 2024-06-11

## 2024-06-11 ENCOUNTER — OFFICE VISIT (OUTPATIENT)
Dept: NEUROLOGY | Facility: CLINIC | Age: 89
End: 2024-06-11
Payer: MEDICARE

## 2024-06-11 VITALS
BODY MASS INDEX: 33.11 KG/M2 | WEIGHT: 206 LBS | HEIGHT: 66 IN | HEART RATE: 56 BPM | DIASTOLIC BLOOD PRESSURE: 76 MMHG | SYSTOLIC BLOOD PRESSURE: 129 MMHG

## 2024-06-11 DIAGNOSIS — F41.9 ANXIETY: Primary | ICD-10-CM

## 2024-06-11 DIAGNOSIS — R41.3 MEMORY LOSS: ICD-10-CM

## 2024-06-11 PROCEDURE — 1159F MED LIST DOCD IN RCRD: CPT | Performed by: PSYCHIATRY & NEUROLOGY

## 2024-06-11 PROCEDURE — 1160F RVW MEDS BY RX/DR IN RCRD: CPT | Performed by: PSYCHIATRY & NEUROLOGY

## 2024-06-11 PROCEDURE — 99214 OFFICE O/P EST MOD 30 MIN: CPT | Performed by: PSYCHIATRY & NEUROLOGY

## 2024-06-11 RX ORDER — MEMANTINE HYDROCHLORIDE 5 MG/1
5 TABLET ORAL DAILY
Qty: 30 TABLET | Refills: 0 | Status: SHIPPED | OUTPATIENT
Start: 2024-06-11 | End: 2024-07-11

## 2024-06-11 RX ORDER — MEMANTINE HYDROCHLORIDE 10 MG/1
TABLET ORAL
Qty: 60 TABLET | Refills: 11 | Status: SHIPPED | OUTPATIENT
Start: 2024-06-11

## 2024-06-11 RX ORDER — DONEPEZIL HYDROCHLORIDE 10 MG/1
10 TABLET, FILM COATED ORAL 2 TIMES DAILY
Qty: 180 TABLET | Refills: 3 | Status: SHIPPED | OUTPATIENT
Start: 2024-06-11

## 2024-06-11 NOTE — TELEPHONE ENCOUNTER
Provider: SEIPEL    Caller: LYNN(PHARMACY TECH)    Pharmacy: [id number]    Phone Number: [phone]    Reason for Call: LYNN WITH [facility] CALLED AND STATES THAT CALLED AND IS NEEDING CLARIFICATION ON PT MEMANTINE. PHARMACY IS NEEDING TO KNOW IF PT IS TO TAKE THE 5MG MEMANTINE THEN GO TO THE 10MG OR IS PT TO START THEM BOTH AS THE SAME TIME.    PLEASE REVIEW AND ADVISE  THANK YOU

## 2024-06-11 NOTE — TELEPHONE ENCOUNTER
I put in the start date fot the 10mg rx which starts 30 days after the 5mg script  There is a field in the script which indicates the start date for a script----ask the pharmacy if this field is transmitted to the pharmacy.

## 2024-06-12 NOTE — TELEPHONE ENCOUNTER
S/W LYNN AT [facility] VERIFIED CORRECT RX DIRECTIONS, SHE ADVISED THAT RX DID NOT COME ACROSS INDICATING START DATE OF INCREASE MEDICATION DOSE

## 2024-07-08 DIAGNOSIS — I10 PRIMARY HYPERTENSION: Primary | ICD-10-CM

## 2024-07-08 DIAGNOSIS — R41.3 MEMORY LOSS: ICD-10-CM

## 2024-07-08 RX ORDER — AMLODIPINE BESYLATE 10 MG/1
TABLET ORAL
Qty: 90 TABLET | Refills: 2 | Status: SHIPPED | OUTPATIENT
Start: 2024-07-08

## 2024-07-08 RX ORDER — MEMANTINE HYDROCHLORIDE 5 MG/1
TABLET ORAL
Qty: 30 TABLET | Refills: 0 | Status: SHIPPED | OUTPATIENT
Start: 2024-07-08

## 2024-07-16 ENCOUNTER — HOSPITAL ENCOUNTER (OUTPATIENT)
Dept: CT IMAGING | Facility: HOSPITAL | Age: 89
Discharge: HOME | End: 2024-07-16
Admitting: NEUROLOGICAL SURGERY
Payer: MEDICARE

## 2024-07-16 DIAGNOSIS — I67.1 BRAIN ANEURYSM: ICD-10-CM

## 2024-07-16 PROCEDURE — 70498 CT ANGIOGRAPHY NECK: CPT

## 2024-07-16 PROCEDURE — 70496 CT ANGIOGRAPHY HEAD: CPT

## 2024-07-16 PROCEDURE — 25510000001 IOPAMIDOL PER 1 ML: Performed by: NEUROLOGICAL SURGERY

## 2024-07-16 RX ADMIN — IOPAMIDOL 100 ML: 755 INJECTION, SOLUTION INTRAVENOUS at 11:23

## 2024-07-25 ENCOUNTER — TELEPHONE (OUTPATIENT)
Dept: NEUROSURGERY | Facility: CLINIC | Age: 89
End: 2024-07-25
Payer: MEDICARE

## 2024-07-25 NOTE — TELEPHONE ENCOUNTER
Patient's wife called to let us know she has trouble with transportation getting patient to and from appointments because she does not drive and  has dementia.     She does not know how to work [employer] but would like to know if she could be called to discuss th CTA head results from 7/16/24.

## 2024-07-30 NOTE — TELEPHONE ENCOUNTER
PATIENT'S WIFE CALLED BACK - SHE HAS NOT HEARD ANYTHING BACK FROM US. PLEASE CALL THE PATIENT BACK AND ADVISE ON THE IMAGING RESULTS ASAP.

## 2024-08-01 ENCOUNTER — LAB (OUTPATIENT)
Dept: FAMILY MEDICINE CLINIC | Facility: CLINIC | Age: 89
End: 2024-08-01
Payer: MEDICARE

## 2024-08-01 ENCOUNTER — OFFICE VISIT (OUTPATIENT)
Dept: FAMILY MEDICINE CLINIC | Facility: CLINIC | Age: 89
End: 2024-08-01
Payer: MEDICARE

## 2024-08-01 VITALS
OXYGEN SATURATION: 96 % | BODY MASS INDEX: 31.98 KG/M2 | HEIGHT: 66 IN | HEART RATE: 55 BPM | SYSTOLIC BLOOD PRESSURE: 118 MMHG | RESPIRATION RATE: 20 BRPM | DIASTOLIC BLOOD PRESSURE: 64 MMHG | WEIGHT: 199 LBS

## 2024-08-01 DIAGNOSIS — Z86.39 HISTORY OF IRON DEFICIENCY: ICD-10-CM

## 2024-08-01 DIAGNOSIS — R53.83 FATIGUE, UNSPECIFIED TYPE: Primary | ICD-10-CM

## 2024-08-01 DIAGNOSIS — R53.83 FATIGUE, UNSPECIFIED TYPE: ICD-10-CM

## 2024-08-01 DIAGNOSIS — E55.9 VITAMIN D DEFICIENCY: ICD-10-CM

## 2024-08-01 PROCEDURE — 80053 COMPREHEN METABOLIC PANEL: CPT | Performed by: NURSE PRACTITIONER

## 2024-08-01 PROCEDURE — 82306 VITAMIN D 25 HYDROXY: CPT | Performed by: NURSE PRACTITIONER

## 2024-08-01 PROCEDURE — 83540 ASSAY OF IRON: CPT | Performed by: NURSE PRACTITIONER

## 2024-08-01 PROCEDURE — 84466 ASSAY OF TRANSFERRIN: CPT | Performed by: NURSE PRACTITIONER

## 2024-08-01 PROCEDURE — 1160F RVW MEDS BY RX/DR IN RCRD: CPT | Performed by: NURSE PRACTITIONER

## 2024-08-01 PROCEDURE — 85025 COMPLETE CBC W/AUTO DIFF WBC: CPT | Performed by: NURSE PRACTITIONER

## 2024-08-01 PROCEDURE — 36415 COLL VENOUS BLD VENIPUNCTURE: CPT

## 2024-08-01 PROCEDURE — 1125F AMNT PAIN NOTED PAIN PRSNT: CPT | Performed by: NURSE PRACTITIONER

## 2024-08-01 PROCEDURE — 82607 VITAMIN B-12: CPT | Performed by: NURSE PRACTITIONER

## 2024-08-01 PROCEDURE — 1159F MED LIST DOCD IN RCRD: CPT | Performed by: NURSE PRACTITIONER

## 2024-08-01 PROCEDURE — 99214 OFFICE O/P EST MOD 30 MIN: CPT | Performed by: NURSE PRACTITIONER

## 2024-08-01 NOTE — PROGRESS NOTES
"Chief Complaint  Fatigue and Anorexia    Subjective          Royal Beckman presents to [facility]  History of Present Illness    Is here today with c/o feeling tired and not eating much  Has started using a cane about 6 weeks ago because he began having balance issues  He is sleeping a lot, tires easily, does one thing and then needs a nap    He is diagnosed with a brain aneurysm, has seen the neurosurgeon, Dr. Huerta    Review of Systems   Constitutional:  Positive for activity change, appetite change and fatigue.   Respiratory: Negative.  Negative for shortness of breath.    Cardiovascular: Negative.  Negative for chest pain.   Neurological:  Positive for weakness and light-headedness. Negative for headaches.   Psychiatric/Behavioral:  Positive for sleep disturbance.         Sleeping well, sleeping too much     Objective   Vital Signs:  /64   Pulse 55   Resp 20   Ht 167.6 cm (65.98\")   Wt 90.3 kg (199 lb)   SpO2 96%   BMI 32.14 kg/m²     BP Readings from Last 3 Encounters:   08/01/24 118/64   06/11/24 129/76   04/08/24 125/66        Wt Readings from Last 3 Encounters:   08/01/24 90.3 kg (199 lb)   06/11/24 93.4 kg (206 lb)   05/30/24 90.7 kg (200 lb)              Physical Exam  Vitals reviewed.   Constitutional:       Appearance: Normal appearance. He is obese.   Cardiovascular:      Rate and Rhythm: Normal rate and regular rhythm.      Heart sounds: Normal heart sounds.   Pulmonary:      Effort: Pulmonary effort is normal.      Breath sounds: Normal breath sounds.   Abdominal:      General: Bowel sounds are normal.      Palpations: Abdomen is soft.      Tenderness: There is no right CVA tenderness or left CVA tenderness.   Musculoskeletal:      Right lower leg: No edema.      Left lower leg: No edema.   Skin:     General: Skin is warm.   Neurological:      Mental Status: He is alert and oriented to person, place, and time.        Result Review :               "   Assessment and Plan    Diagnoses and all orders for this visit:    1. Fatigue, unspecified type (Primary)  -     CBC w AUTO Differential; Future  -     Iron and TIBC; Future  -     Comprehensive metabolic panel; Future  -     Vitamin D 25 hydroxy; Future  -     Vitamin B12; Future  -     Ambulatory Referral to Case Management Care Coordination, Barriers to Care, Caregiving/Support, Advanced Care Planning    2. Vitamin D deficiency  -     Vitamin D 25 hydroxy; Future    3. History of iron deficiency  -     Iron and TIBC; Future    Referral to  per his wife's request       Follow Up   Return sooner s needed, for Next scheduled follow up.  Patient was given instructions and counseling regarding his condition or for health maintenance advice. Please see specific information pulled into the AVS if appropriate.

## 2024-08-02 ENCOUNTER — REFERRAL TRIAGE (OUTPATIENT)
Dept: CASE MANAGEMENT | Facility: CLINIC | Age: 89
End: 2024-08-02
Payer: MEDICARE

## 2024-08-02 LAB
25(OH)D3 SERPL-MCNC: 22.3 NG/ML (ref 30–100)
ALBUMIN SERPL-MCNC: 3.5 G/DL (ref 3.5–5.2)
ALBUMIN/GLOB SERPL: 1.5 G/DL
ALP SERPL-CCNC: 91 U/L (ref 39–117)
ALT SERPL W P-5'-P-CCNC: 13 U/L (ref 1–41)
ANION GAP SERPL CALCULATED.3IONS-SCNC: 8 MMOL/L (ref 5–15)
AST SERPL-CCNC: 19 U/L (ref 1–40)
BASOPHILS # BLD AUTO: 0.03 10*3/MM3 (ref 0–0.2)
BASOPHILS NFR BLD AUTO: 0.4 % (ref 0–1.5)
BILIRUB SERPL-MCNC: 0.5 MG/DL (ref 0–1.2)
BUN SERPL-MCNC: 19 MG/DL (ref 8–23)
BUN/CREAT SERPL: 25.3 (ref 7–25)
CALCIUM SPEC-SCNC: 9 MG/DL (ref 8.6–10.5)
CHLORIDE SERPL-SCNC: 109 MMOL/L (ref 98–107)
CO2 SERPL-SCNC: 22 MMOL/L (ref 22–29)
CREAT SERPL-MCNC: 0.75 MG/DL (ref 0.76–1.27)
DEPRECATED RDW RBC AUTO: 50.8 FL (ref 37–54)
EGFRCR SERPLBLD CKD-EPI 2021: 87.9 ML/MIN/1.73
EOSINOPHIL # BLD AUTO: 0.07 10*3/MM3 (ref 0–0.4)
EOSINOPHIL NFR BLD AUTO: 1 % (ref 0.3–6.2)
ERYTHROCYTE [DISTWIDTH] IN BLOOD BY AUTOMATED COUNT: 14 % (ref 12.3–15.4)
GLOBULIN UR ELPH-MCNC: 2.4 GM/DL
GLUCOSE SERPL-MCNC: 115 MG/DL (ref 65–99)
HCT VFR BLD AUTO: 40.2 % (ref 37.5–51)
HGB BLD-MCNC: 13.4 G/DL (ref 13–17.7)
IMM GRANULOCYTES # BLD AUTO: 0.03 10*3/MM3 (ref 0–0.05)
IMM GRANULOCYTES NFR BLD AUTO: 0.4 % (ref 0–0.5)
IRON 24H UR-MRATE: 93 MCG/DL (ref 59–158)
IRON SATN MFR SERPL: 35 % (ref 20–50)
LYMPHOCYTES # BLD AUTO: 1.4 10*3/MM3 (ref 0.7–3.1)
LYMPHOCYTES NFR BLD AUTO: 20.5 % (ref 19.6–45.3)
MCH RBC QN AUTO: 33.2 PG (ref 26.6–33)
MCHC RBC AUTO-ENTMCNC: 33.3 G/DL (ref 31.5–35.7)
MCV RBC AUTO: 99.5 FL (ref 79–97)
MONOCYTES # BLD AUTO: 0.63 10*3/MM3 (ref 0.1–0.9)
MONOCYTES NFR BLD AUTO: 9.2 % (ref 5–12)
NEUTROPHILS NFR BLD AUTO: 4.68 10*3/MM3 (ref 1.7–7)
NEUTROPHILS NFR BLD AUTO: 68.5 % (ref 42.7–76)
NRBC BLD AUTO-RTO: 0 /100 WBC (ref 0–0.2)
PLATELET # BLD AUTO: 197 10*3/MM3 (ref 140–450)
PMV BLD AUTO: 11.1 FL (ref 6–12)
POTASSIUM SERPL-SCNC: 4 MMOL/L (ref 3.5–5.2)
PROT SERPL-MCNC: 5.9 G/DL (ref 6–8.5)
RBC # BLD AUTO: 4.04 10*6/MM3 (ref 4.14–5.8)
SODIUM SERPL-SCNC: 139 MMOL/L (ref 136–145)
TIBC SERPL-MCNC: 264 MCG/DL (ref 298–536)
TRANSFERRIN SERPL-MCNC: 177 MG/DL (ref 200–360)
VIT B12 BLD-MCNC: 1143 PG/ML (ref 211–946)
WBC NRBC COR # BLD AUTO: 6.84 10*3/MM3 (ref 3.4–10.8)

## 2024-08-02 RX ORDER — ERGOCALCIFEROL 1.25 MG/1
50000 CAPSULE ORAL WEEKLY
Qty: 8 CAPSULE | Refills: 0 | Status: SHIPPED | OUTPATIENT
Start: 2024-08-02 | End: 2024-09-21

## 2024-08-06 ENCOUNTER — PATIENT OUTREACH (OUTPATIENT)
Dept: CASE MANAGEMENT | Facility: CLINIC | Age: 89
End: 2024-08-06
Payer: MEDICARE

## 2024-08-06 DIAGNOSIS — I25.10 CHRONIC CORONARY ARTERY DISEASE: ICD-10-CM

## 2024-08-06 DIAGNOSIS — R41.89 COGNITIVE DECLINE: Primary | ICD-10-CM

## 2024-08-06 NOTE — OUTREACH NOTE
AMBULATORY CASE MANAGEMENT NOTE    Names and Relationships of Patient/Support Persons: Contact: JANET MENDOZA; Relationship: Emergency Contact -     [facility] Interim Update    Spoke with patient's wife at this time regarding provider referral, identified self and role.  Patient is unavailable at this time, [facility] this ACM call her.  Wife states she [facility] because patient has dementia and she needs resources for the future.  She states she is not certain where to go from here, but that so far patient is fine at home.  She worries about the future and wants to have as many resources as possible for if things start to worsen with patient's dementia.  She reports that right now the patient drives but he is only allowed to drive with wife in the car.  Wife states she has vision issues so once the patient is unable to drive anymore she will need transportation resources.  She also states she has difficulty sometimes getting the patient to clean himself and take his medications.  She would like resources for in home care as well, reports that they do have LTC insurance.  Wife agreeable to  outreach for transportation resources and in home care resources.  [facility] also [facility] to her home for future reference, as her goal right now is to keep the patient in their home.    Discussed the importance of keeping a routine for the patient to keep him feeling safe and without fear.   [place] times a [place] for exercise.  Discussed agitation and aggression, wife states patient has not been agitated since he was placed on Zoloft and has not had aggression.  She also denies any wandering.  Discussed safety precautions to take to prevent injury should wandering occur, including alarm systems.  She states he does not wander at night or anytime, this ACM mentioned that should he start wandering at night  she should take further precautions to keep him safe and to keep him from leaving the house.  Discussed referral to the [employer] for further information and resources, wife is agreeable, referral sent.  Wife also mentioned that she has had an issue with patient writing checks he shouldn't be.  She states she is going to discuss this with their bank.  Wife does report having financial and medical POA.    Offered to follow along with patient and assist with dementia and other chronic conditions through [facility], wife declines for now, states that things are ok right now.  [facility] will send CCM flyer and business card to home, wife agreeable.  Wife declines further outreach through [facility].  Will close program.    Kelly LANDIS  Ambulatory Case Management    8/6/2024, 12:00 EDT

## 2024-08-08 ENCOUNTER — PATIENT OUTREACH (OUTPATIENT)
Age: 89
End: 2024-08-08
Payer: MEDICARE

## 2024-08-08 NOTE — OUTREACH NOTE
Social Work Assessment  Questions/Answers      Flowsheet Row Most Recent Value   Referral Source outpatient staff, outpatient clinic, nursing   Reason for Consult community resources, transportation   Preferred Language English   Advance Care Planning Reviewed no concerns identified   People in Home spouse   Current Living Arrangements home   Potentially Unsafe Housing Conditions none   In the past 12 months has the electric, gas, oil, or water company threatened to shut off services in your home? No   Primary Care Provided by spouse/significant other   Provides Primary Care For no one, unable/limited ability to care for self   Family Caregiver if Needed child(ruma), adult   Quality of Family Relationships helpful, involved   Able to Return to Prior Arrangements yes   Employment Status retired   Source of Income social security   Application for Public Assistance not applied   Usual Activity Tolerance fair   Current Activity Tolerance fair   Medications assistive person   Meal Preparation assistive person   Housekeeping assistive person   Laundry assistive person   Shopping assistive person          SDOH updated and reviewed with the patient during this program:  Financial Resource Strain: Low Risk  (8/8/2024)    Overall Financial Resource Strain (CARDIA)     Difficulty of Paying Living Expenses: Not very hard      --     Food Insecurity: No Food Insecurity (8/8/2024)    Hunger Vital Sign     Worried About Running Out of Food in the Last Year: Never true     Ran Out of Food in the Last Year: Never true      --     Housing Stability: Not At Risk (8/8/2024)    Housing Stability     Current Living Arrangements: home     Potentially Unsafe Housing Conditions: none      --     Transportation Needs: No Transportation Needs (8/8/2024)    PRAPARE - Transportation     Lack of Transportation (Medical): No     Lack of Transportation (Non-Medical): No     Patient Outreach    SW received referral via [facility] re: resources for  patient. SW called and spoke to patient spouse. Patient spouse interested in future transportation resources and possibly in home care resources. Referral sent to [email or web address] [facility]. Denies any additional resources / needs. Agreeable for CHARLIE to follow up next week.     Ivania PALOMARES -   Ambulatory Case Management    8/8/2024, 15:44 EDT

## 2024-08-09 NOTE — PROGRESS NOTES
Subjective   History of Present Illness: Royal Beckman is a 89 y.o. 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 CTA head/neck was done on 7/16/24.  He is doing well today I spoke to his spouse.  She reports that his dementia has progressed.  She denies any severe headaches.  Denies any episodes of loss of consciousness.  Denies any strokelike episodes with changes in strength or sensation.  Confusion has worsened.    You have chosen to receive care through a telephone visit. Do you consent to use a telephone visit for your medical care today? Yes     History of Present Illness    The following portions of the patient's history were reviewed and updated as appropriate: allergies, current medications, past family history, past medical history, past social history, past surgical history, and problem list.    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    Hyperglycemia     Hyperlipidemia     Hypertension     Knee swelling wiyhin the past several months    Low back pain 6 to 8 months ago    Low back strain     Myocardial infarction     Neck strain     Peripheral neuropathy don't remember        Past Surgical History:   Procedure Laterality Date    CARDIAC CATHETERIZATION      COLONOSCOPY      CORONARY ARTERY BYPASS GRAFT            Current Outpatient Medications:     amLODIPine (NORVASC) 10 MG tablet, TAKE ONE TABLET BY MOUTH EVERY DAY, Disp: 90 tablet, Rfl: 2    aspirin 81 MG tablet, ASPIRIN 81 MG ORAL TABLET, Disp: , Rfl:     atenolol (TENORMIN) 25 MG tablet, TAKE 1/2 TABLET BY MOUTH EVERY DAY, Disp: 45 tablet, Rfl: 3    atorvastatin (LIPITOR) 20 MG tablet, Take 1 tablet by mouth Daily., Disp: 90 tablet, Rfl: 3    Cyanocobalamin (B-12) 1000 MCG capsule, B-12 1000 MCG CAPS, Disp: , Rfl:     Diclofenac Sodium (VOLTAREN) 1 % gel gel, Apply 4 g topically to the  appropriate area as directed 3 (Three) Times a Day As Needed (pain)., Disp: 100 g, Rfl: 0    donepezil (Aricept) 10 MG tablet, Take 1 tablet by mouth 2 (Two) Times a Day., Disp: 180 tablet, Rfl: 3    memantine (NAMENDA) 10 MG tablet, One daily for one month then one bid, Disp: 60 tablet, Rfl: 11    memantine (NAMENDA) 5 MG tablet, TAKE 1 TABLET BY MOUTH DAILY FOR 30 DAYS THEN START THE 10 MG PRESCRIPTION, Disp: 30 tablet, Rfl: 0    niacin 500 MG tablet, NIACIN 500 MG TABS, Disp: , Rfl:     Omega-3 Fatty Acids ([employer] FISH OIL) 1200 MG capsule, [employer]S FISH OIL 1200 MG CAPS, Disp: , Rfl:     sertraline (Zoloft) 50 MG tablet, Take 1 tablet by mouth Daily., Disp: 30 tablet, Rfl: 11    vitamin D (ERGOCALCIFEROL) 1.25 MG (25809 UT) capsule capsule, Take 1 capsule by mouth 1 (One) Time Per Week for 8 doses., Disp: 8 capsule, Rfl: 0    vitamin D3 125 MCG (5000 UT) capsule capsule, Take 1 capsule by mouth Daily., Disp: , Rfl:      No Known Allergies     Social History     Socioeconomic History    Marital status:    Tobacco Use    Smoking status: Former     Current packs/day: 0.00     Average packs/day: 1.5 packs/day for 5.2 years (7.8 ttl pk-yrs)     Types: Cigarettes     Start date: 10/10/1964     Quit date: 12/15/1969     Years since quittin.7     Passive exposure: Past    Smokeless tobacco: Never   Vaping Use    Vaping status: Never Used   Substance and Sexual Activity    Alcohol use: Yes     Comment: occasional, only    Drug use: No    Sexual activity: Not Currently     Partners: Female        Family History   Problem Relation Age of Onset    Aneurysm Father     Heart disease Brother         he passe away several tears ago        Review of Systems    Objective     There were no vitals filed for this visit.  There is no height or weight on file to calculate BMI.      Physical Exam  Neurologic Exam  This was a telephone visit and a physical exam was not performed.  We spoke for 5 minutes      Assessment & Plan    Independent Review of Radiographic Studies:      I personally reviewed the images from the following studies.  CTA head and neck from 2024 and 2023  The CTA images were reviewed and demonstrate no significant change.  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  She reports that his dementia has progressed.  I reviewed the CTA findings and he has not had any growth of the aneurysm and there is no significant change.  I will plan to have him follow-up as needed with any new neurologic symptoms or concerns.   Diagnoses and all orders for this visit:    1. Brain aneurysm (Primary)      Return if symptoms worsen or fail to improve.

## 2024-08-14 ENCOUNTER — TELEPHONE (OUTPATIENT)
Dept: CASE MANAGEMENT | Facility: CLINIC | Age: 89
End: 2024-08-14
Payer: MEDICARE

## 2024-08-14 NOTE — TELEPHONE ENCOUNTER
ACM notes missed call and voicemail from patient's wife asking for information regarding in home care and getting someone out to do an evaluation.  Attempted to call wife back, no answer, left voicemail.

## 2024-08-15 ENCOUNTER — PATIENT OUTREACH (OUTPATIENT)
Age: 89
End: 2024-08-15
Payer: MEDICARE

## 2024-08-15 NOTE — OUTREACH NOTE
Per [facility] review, [facility] referral ACCEPTED and assigned a primary  (Anuj Murillo). SW to discharge as resources have been provided.     Ivania PALOMARES -   Ambulatory Case Management    8/15/2024, 09:40 EDT

## 2024-08-16 ENCOUNTER — CLINICAL SUPPORT (OUTPATIENT)
Dept: NEUROSURGERY | Facility: CLINIC | Age: 89
End: 2024-08-16
Payer: MEDICARE

## 2024-08-16 DIAGNOSIS — I67.1 BRAIN ANEURYSM: Primary | ICD-10-CM

## 2024-08-16 PROCEDURE — 99212 OFFICE O/P EST SF 10 MIN: CPT | Performed by: NEUROLOGICAL SURGERY

## 2024-09-26 ENCOUNTER — LAB (OUTPATIENT)
Dept: FAMILY MEDICINE CLINIC | Facility: CLINIC | Age: 89
End: 2024-09-26
Payer: MEDICARE

## 2024-09-26 ENCOUNTER — OFFICE VISIT (OUTPATIENT)
Dept: FAMILY MEDICINE CLINIC | Facility: CLINIC | Age: 89
End: 2024-09-26
Payer: MEDICARE

## 2024-09-26 VITALS
RESPIRATION RATE: 18 BRPM | HEART RATE: 84 BPM | WEIGHT: 201 LBS | HEIGHT: 66 IN | BODY MASS INDEX: 32.3 KG/M2 | OXYGEN SATURATION: 96 % | SYSTOLIC BLOOD PRESSURE: 108 MMHG | DIASTOLIC BLOOD PRESSURE: 64 MMHG

## 2024-09-26 DIAGNOSIS — E55.9 VITAMIN D DEFICIENCY: Primary | ICD-10-CM

## 2024-09-26 DIAGNOSIS — R53.83 FATIGUE, UNSPECIFIED TYPE: ICD-10-CM

## 2024-09-26 DIAGNOSIS — E55.9 VITAMIN D DEFICIENCY: ICD-10-CM

## 2024-09-26 LAB
ALBUMIN SERPL-MCNC: 3.7 G/DL (ref 3.5–5.2)
ALBUMIN/GLOB SERPL: 1.4 G/DL
ALP SERPL-CCNC: 124 U/L (ref 39–117)
ALT SERPL W P-5'-P-CCNC: 12 U/L (ref 1–41)
ANION GAP SERPL CALCULATED.3IONS-SCNC: 12.6 MMOL/L (ref 5–15)
AST SERPL-CCNC: 31 U/L (ref 1–40)
BASOPHILS # BLD AUTO: 0.03 10*3/MM3 (ref 0–0.2)
BASOPHILS NFR BLD AUTO: 0.4 % (ref 0–1.5)
BILIRUB SERPL-MCNC: 0.6 MG/DL (ref 0–1.2)
BUN SERPL-MCNC: 14 MG/DL (ref 8–23)
BUN/CREAT SERPL: 17.3 (ref 7–25)
CALCIUM SPEC-SCNC: 9.1 MG/DL (ref 8.6–10.5)
CHLORIDE SERPL-SCNC: 107 MMOL/L (ref 98–107)
CO2 SERPL-SCNC: 21.4 MMOL/L (ref 22–29)
CREAT SERPL-MCNC: 0.81 MG/DL (ref 0.76–1.27)
DEPRECATED RDW RBC AUTO: 50.9 FL (ref 37–54)
EGFRCR SERPLBLD CKD-EPI 2021: 85.3 ML/MIN/1.73
EOSINOPHIL # BLD AUTO: 0.08 10*3/MM3 (ref 0–0.4)
EOSINOPHIL NFR BLD AUTO: 1 % (ref 0.3–6.2)
ERYTHROCYTE [DISTWIDTH] IN BLOOD BY AUTOMATED COUNT: 13.6 % (ref 12.3–15.4)
GLOBULIN UR ELPH-MCNC: 2.7 GM/DL
GLUCOSE SERPL-MCNC: 112 MG/DL (ref 65–99)
HCT VFR BLD AUTO: 42.9 % (ref 37.5–51)
HGB BLD-MCNC: 14.3 G/DL (ref 13–17.7)
IMM GRANULOCYTES # BLD AUTO: 0.02 10*3/MM3 (ref 0–0.05)
IMM GRANULOCYTES NFR BLD AUTO: 0.2 % (ref 0–0.5)
LYMPHOCYTES # BLD AUTO: 1.58 10*3/MM3 (ref 0.7–3.1)
LYMPHOCYTES NFR BLD AUTO: 19.7 % (ref 19.6–45.3)
MCH RBC QN AUTO: 34 PG (ref 26.6–33)
MCHC RBC AUTO-ENTMCNC: 33.3 G/DL (ref 31.5–35.7)
MCV RBC AUTO: 102.1 FL (ref 79–97)
MONOCYTES # BLD AUTO: 0.71 10*3/MM3 (ref 0.1–0.9)
MONOCYTES NFR BLD AUTO: 8.8 % (ref 5–12)
NEUTROPHILS NFR BLD AUTO: 5.61 10*3/MM3 (ref 1.7–7)
NEUTROPHILS NFR BLD AUTO: 69.9 % (ref 42.7–76)
NRBC BLD AUTO-RTO: 0 /100 WBC (ref 0–0.2)
PLATELET # BLD AUTO: 212 10*3/MM3 (ref 140–450)
PMV BLD AUTO: 11.1 FL (ref 6–12)
POTASSIUM SERPL-SCNC: 4.1 MMOL/L (ref 3.5–5.2)
PROT SERPL-MCNC: 6.4 G/DL (ref 6–8.5)
RBC # BLD AUTO: 4.2 10*6/MM3 (ref 4.14–5.8)
SODIUM SERPL-SCNC: 141 MMOL/L (ref 136–145)
TSH SERPL DL<=0.05 MIU/L-ACNC: 1.58 UIU/ML (ref 0.27–4.2)
WBC NRBC COR # BLD AUTO: 8.03 10*3/MM3 (ref 3.4–10.8)

## 2024-09-26 PROCEDURE — 1159F MED LIST DOCD IN RCRD: CPT | Performed by: NURSE PRACTITIONER

## 2024-09-26 PROCEDURE — 1160F RVW MEDS BY RX/DR IN RCRD: CPT | Performed by: NURSE PRACTITIONER

## 2024-09-26 PROCEDURE — 36415 COLL VENOUS BLD VENIPUNCTURE: CPT

## 2024-09-26 PROCEDURE — 85025 COMPLETE CBC W/AUTO DIFF WBC: CPT | Performed by: NURSE PRACTITIONER

## 2024-09-26 PROCEDURE — 1125F AMNT PAIN NOTED PAIN PRSNT: CPT | Performed by: NURSE PRACTITIONER

## 2024-09-26 PROCEDURE — 84443 ASSAY THYROID STIM HORMONE: CPT | Performed by: NURSE PRACTITIONER

## 2024-09-26 PROCEDURE — 99213 OFFICE O/P EST LOW 20 MIN: CPT | Performed by: NURSE PRACTITIONER

## 2024-09-27 LAB — 25(OH)D3 SERPL-MCNC: 33.7 NG/ML (ref 30–100)

## 2024-10-15 ENCOUNTER — TELEPHONE (OUTPATIENT)
Dept: FAMILY MEDICINE CLINIC | Facility: CLINIC | Age: 89
End: 2024-10-15

## 2024-10-15 NOTE — TELEPHONE ENCOUNTER
Caller: JANET MENDOZA    Relationship: Emergency Contact    Best call back number: [phone]     What is the best time to reach you: ANY    Who are you requesting to speak with (clinical staff, provider,  specific staff member): PCP    Do you know the name of the person who called:     What was the call regarding: PATIENT'S WIFE WANTS TO SPEAK WITH PCP ABOUT THE PATIENT.    Is it okay if the provider responds through MyChart:        yes...

## 2024-10-23 ENCOUNTER — OFFICE VISIT (OUTPATIENT)
Dept: FAMILY MEDICINE CLINIC | Facility: CLINIC | Age: 89
End: 2024-10-23
Payer: MEDICARE

## 2024-10-23 VITALS
DIASTOLIC BLOOD PRESSURE: 64 MMHG | HEART RATE: 84 BPM | BODY MASS INDEX: 31.69 KG/M2 | RESPIRATION RATE: 16 BRPM | HEIGHT: 66 IN | SYSTOLIC BLOOD PRESSURE: 118 MMHG | WEIGHT: 197.2 LBS | OXYGEN SATURATION: 96 %

## 2024-10-23 DIAGNOSIS — R53.1 WEAKNESS GENERALIZED: ICD-10-CM

## 2024-10-23 DIAGNOSIS — M54.2 NECK PAIN ON RIGHT SIDE: ICD-10-CM

## 2024-10-23 DIAGNOSIS — Z23 ENCOUNTER FOR ADMINISTRATION OF VACCINE: ICD-10-CM

## 2024-10-23 DIAGNOSIS — M25.552 BILATERAL HIP PAIN: Primary | ICD-10-CM

## 2024-10-23 DIAGNOSIS — M25.551 BILATERAL HIP PAIN: Primary | ICD-10-CM

## 2024-10-23 PROCEDURE — 1160F RVW MEDS BY RX/DR IN RCRD: CPT | Performed by: NURSE PRACTITIONER

## 2024-10-23 PROCEDURE — G0008 ADMIN INFLUENZA VIRUS VAC: HCPCS | Performed by: NURSE PRACTITIONER

## 2024-10-23 PROCEDURE — 90662 IIV NO PRSV INCREASED AG IM: CPT | Performed by: NURSE PRACTITIONER

## 2024-10-23 PROCEDURE — 1125F AMNT PAIN NOTED PAIN PRSNT: CPT | Performed by: NURSE PRACTITIONER

## 2024-10-23 PROCEDURE — 1159F MED LIST DOCD IN RCRD: CPT | Performed by: NURSE PRACTITIONER

## 2024-10-23 PROCEDURE — 99213 OFFICE O/P EST LOW 20 MIN: CPT | Performed by: NURSE PRACTITIONER

## 2024-10-23 NOTE — PROGRESS NOTES
"Chief Complaint  Tailbone Pain and Leg Pain (Shun and hips)    Subjective          Royal Beckman presents to [facility]  History of Present Illness    Is here today with c/o ble pain, b/l hip pain, b/l knee pain, and also tail bone pain  His wife endorses that he is weaker than he used to be, she tells me that he also does not have strength in his arms to pull himself up when he falls    He has refused to take any medication for the pain    Review of Systems   Respiratory: Negative.     Cardiovascular: Negative.    Gastrointestinal: Negative.    Musculoskeletal:  Positive for arthralgias.   Neurological:  Positive for weakness.       Objective   Vital Signs:  /64 (BP Location: Left arm, Patient Position: Sitting, Cuff Size: Adult)   Pulse 84   Resp 16   Ht 167.6 cm (65.98\")   Wt 89.4 kg (197 lb 3.2 oz)   SpO2 96%   BMI 31.85 kg/m²     BP Readings from Last 3 Encounters:   10/23/24 118/64   09/26/24 108/64   08/01/24 118/64        Wt Readings from Last 3 Encounters:   10/23/24 89.4 kg (197 lb 3.2 oz)   09/26/24 91.2 kg (201 lb)   08/01/24 90.3 kg (199 lb)       Physical Exam  Constitutional:       Appearance: Normal appearance. He is obese.   Cardiovascular:      Rate and Rhythm: Normal rate.   Pulmonary:      Effort: Pulmonary effort is normal.   Musculoskeletal:         General: Tenderness present. No swelling.   Neurological:      Mental Status: He is alert and oriented to person, place, and time.        Result Review :                 Assessment and Plan    Diagnoses and all orders for this visit:    1. Bilateral hip pain (Primary)  -     Ambulatory Referral to Physical Therapy for Evaluation & Treatment  -     XR Hips Bilateral With or Without Pelvis 2 View; Future    2. Weakness generalized  -     Ambulatory Referral to Physical Therapy for Evaluation & Treatment    3. Neck pain on right side  -     Ambulatory Referral to Physical Therapy for Evaluation & " Treatment  -     XR Spine Cervical Complete 4 or 5 View; Future    4. Encounter for administration of vaccine  -     Fluzone High-Dose 65+yrs ([id number])           Follow Up   Return as needed, for Next scheduled follow up.  Patient was given instructions and counseling regarding his condition or for health maintenance advice. Please see specific information pulled into the AVS if appropriate.

## 2024-10-24 ENCOUNTER — HOSPITAL ENCOUNTER (OUTPATIENT)
Dept: GENERAL RADIOLOGY | Facility: HOSPITAL | Age: 89
Discharge: HOME | End: 2024-10-24
Payer: MEDICARE

## 2024-10-24 DIAGNOSIS — M25.552 BILATERAL HIP PAIN: ICD-10-CM

## 2024-10-24 DIAGNOSIS — M25.551 BILATERAL HIP PAIN: ICD-10-CM

## 2024-10-24 DIAGNOSIS — M54.2 NECK PAIN ON RIGHT SIDE: ICD-10-CM

## 2024-10-24 PROCEDURE — 73521 X-RAY EXAM HIPS BI 2 VIEWS: CPT

## 2024-10-24 PROCEDURE — 72050 X-RAY EXAM NECK SPINE 4/5VWS: CPT

## 2024-10-28 ENCOUNTER — TELEPHONE (OUTPATIENT)
Dept: FAMILY MEDICINE CLINIC | Facility: CLINIC | Age: 89
End: 2024-10-28
Payer: MEDICARE

## 2024-10-28 NOTE — TELEPHONE ENCOUNTER
Hub staff attempted to follow warm transfer process and was unsuccessful     Caller: JANET MENDOZA    Relationship to patient: Emergency Contact    Best call back number: [phone]     Patient is needing: RETURNING MISSED CALL FROM OFFICE.

## 2024-10-28 NOTE — TELEPHONE ENCOUNTER
----- Message from Ashli CAMERON sent at 10/28/2024 10:06 AM EDT -----    ----- Message -----  From: Edin Mckeon APRN  Sent: 10/28/2024   6:10 AM EDT  To: Ashli Feliciano MA    Please call with result note, thank you.  The hip x rays show arthritis, there are no fractures or other acute findings.

## 2024-11-13 ENCOUNTER — TELEPHONE (OUTPATIENT)
Dept: CASE MANAGEMENT | Facility: CLINIC | Age: 89
End: 2024-11-13
Payer: MEDICARE

## 2024-11-13 NOTE — TELEPHONE ENCOUNTER
ACM notes missed call and voicemail from patient's wife Eugenia.  Attempted to call wife back at this time, no answer, unable to leave VM.

## 2024-11-14 ENCOUNTER — TELEPHONE (OUTPATIENT)
Dept: CASE MANAGEMENT | Facility: CLINIC | Age: 89
End: 2024-11-14
Payer: MEDICARE

## 2024-11-14 NOTE — TELEPHONE ENCOUNTER
Attempted to contact patient's wife Eugenia at this time to return call, woman answered who identified herself as patient's daughter-in-law, states wife is not available at this time.  Provided DIL with this ACM's direct contact number and advised to have wife call back.

## 2024-11-18 ENCOUNTER — TELEPHONE (OUTPATIENT)
Dept: CASE MANAGEMENT | Facility: CLINIC | Age: 89
End: 2024-11-18
Payer: MEDICARE

## 2024-11-18 ENCOUNTER — PATIENT OUTREACH (OUTPATIENT)
Dept: CASE MANAGEMENT | Facility: CLINIC | Age: 89
End: 2024-11-18
Payer: MEDICARE

## 2024-11-18 NOTE — OUTREACH NOTE
AMBULATORY CASE MANAGEMENT NOTE    Names and Relationships of Patient/Support Persons:  -     Patient Outreach    Incoming call from patient's wife at this time, wife states patient's dementia has worsened some and is asking for resources.  She verified that she did receive resources that this [facility] had previously sent 8/6/24.  She states patient is going to [facility] for PT, but isn't doing exercises in the home so would like to get home health for PT.  Explained that since patient is attending outpatient PT and is not homebound he would not qualify for home health PT.  Wife verbalized understanding, is interested in in home care and has questions as to what her LTC insurance will cover.  Recommended she contact Jenna Khan at [facility] as she deals with LTC insurance and in home care, contact information provided and wife agreeable to referral sent.  Also discussed palliative care and how they can assist with symptom management in dementia  Wife is not interested in palliative care at this time.    Wife is unsure if she spoke with [facility] about in home care, states she thinks she did and they were unable to help.  She also mentions she needs transportation resources as she was quoted an expensive price for transportation previously.  Provided her with information on [employer] which services her location.  She does mention that her son and DIL moved in to help her at home.  Patient denies any other needs at this time, will contact [facility] with further questions.      Kelly LANDIS  Ambulatory Case Management    11/18/2024, 12:27 EST

## 2024-12-10 ENCOUNTER — TELEPHONE (OUTPATIENT)
Dept: FAMILY MEDICINE CLINIC | Facility: CLINIC | Age: 89
End: 2024-12-10
Payer: MEDICARE

## 2024-12-10 NOTE — TELEPHONE ENCOUNTER
Patient has dementia. He has been in bed since Sunday and is unwilling to get out of bed. Said it is nice and warm and he doesn't want to get up. He has not eaten or drank anything since Sunday. Wife reports that he is in a diaper and pad. Wife has called Hospice but they will have to get back with her. Wife is wanting to know if she should just leave him in bed or do something different. Please advise. (NELLY Do is out of office this week but has answered a few messages.)

## 2024-12-10 NOTE — TELEPHONE ENCOUNTER
After conversing with Ernestina, she recommends that patient be seen in the ER, if he does not/can not get up, EMS may be called. I spoke with wife, she verbalized understanding and will have him seen in the ER.

## 2024-12-16 ENCOUNTER — TELEPHONE (OUTPATIENT)
Dept: CASE MANAGEMENT | Facility: CLINIC | Age: 89
End: 2024-12-16
Payer: MEDICARE

## 2024-12-16 NOTE — TELEPHONE ENCOUNTER
Attempted to contact patient's wife Eugenia at this time for ACM monthly check in, no answer, LMTCB

## 2024-12-17 ENCOUNTER — PATIENT OUTREACH (OUTPATIENT)
Dept: CASE MANAGEMENT | Facility: CLINIC | Age: 89
End: 2024-12-17
Payer: MEDICARE

## 2024-12-17 NOTE — OUTREACH NOTE
AMBULATORY CASE MANAGEMENT NOTE    Names and Relationships of Patient/Support Persons: Contact: EUGENIA MENDOZA; Relationship: Emergency Contact  Contact: [facility] - Dalia; Relationship:  -     Patient Outreach    ACM notes telephone call from patient's wife Eugenia 12/10/24 regarding patient not wanting to get out of bed or eat.  ACM placed call to wife, discussed patient's status.  Wife states patient has still been staying in bed intermittently,, not wanting to get out of bed because it's nice and warm.  She states that he only takes some of his meds based on what time he wakes up and is asking if there is a way to cut back on some of his medications.  Explained that this would need to be discussed with PCP at upcoming appt.  Discussed goals for patient with wife, wife states she wants to keep patient at home as long as possible.  Discussed hospice, she states that [facility] came out and evaluated the patient and told her he didn't meet criteria for Hospice, so referred her to [facility].  She states [facility] is scheduled to come out on January 29th.  Discussed 2nd opinion for hospice, wife states that she wasn't aware there were other agencies.  Discussed goals of care further, wife is agreeable to referral to [facility].  [facility] provided wife with direct contact number to call if she has any other needs or concerns.  She denies any other needs at this time.    Care Coordination    Discussed case with Dalia at [facility].  She states she will call the patient's wife Eugenia to discuss further how Hospice can assist with patient's care.      Kelly LANDIS  Ambulatory Case Management    12/17/2024, 09:56 EST

## 2024-12-19 ENCOUNTER — OFFICE VISIT (OUTPATIENT)
Dept: FAMILY MEDICINE CLINIC | Facility: CLINIC | Age: 89
End: 2024-12-19
Payer: MEDICARE

## 2024-12-19 ENCOUNTER — LAB (OUTPATIENT)
Dept: FAMILY MEDICINE CLINIC | Facility: CLINIC | Age: 89
End: 2024-12-19
Payer: MEDICARE

## 2024-12-19 VITALS
HEART RATE: 53 BPM | DIASTOLIC BLOOD PRESSURE: 68 MMHG | WEIGHT: 184.6 LBS | BODY MASS INDEX: 29.67 KG/M2 | HEIGHT: 66 IN | OXYGEN SATURATION: 97 % | SYSTOLIC BLOOD PRESSURE: 102 MMHG

## 2024-12-19 DIAGNOSIS — Z00.00 MEDICARE ANNUAL WELLNESS VISIT, SUBSEQUENT: Primary | ICD-10-CM

## 2024-12-19 DIAGNOSIS — Z00.00 MEDICARE ANNUAL WELLNESS VISIT, SUBSEQUENT: ICD-10-CM

## 2024-12-19 DIAGNOSIS — I10 PRIMARY HYPERTENSION: ICD-10-CM

## 2024-12-19 DIAGNOSIS — Z86.39 HISTORY OF IRON DEFICIENCY: ICD-10-CM

## 2024-12-19 LAB
ALBUMIN SERPL-MCNC: 3.5 G/DL (ref 3.5–5.2)
ALBUMIN/GLOB SERPL: 1.3 G/DL
ALP SERPL-CCNC: 104 U/L (ref 39–117)
ALT SERPL W P-5'-P-CCNC: 9 U/L (ref 1–41)
ANION GAP SERPL CALCULATED.3IONS-SCNC: 11.1 MMOL/L (ref 5–15)
AST SERPL-CCNC: 18 U/L (ref 1–40)
BASOPHILS # BLD AUTO: 0.03 10*3/MM3 (ref 0–0.2)
BASOPHILS NFR BLD AUTO: 0.5 % (ref 0–1.5)
BILIRUB SERPL-MCNC: 0.6 MG/DL (ref 0–1.2)
BUN SERPL-MCNC: 19 MG/DL (ref 8–23)
BUN/CREAT SERPL: 20.4 (ref 7–25)
CALCIUM SPEC-SCNC: 9.2 MG/DL (ref 8.6–10.5)
CHLORIDE SERPL-SCNC: 105 MMOL/L (ref 98–107)
CHOLEST SERPL-MCNC: 125 MG/DL (ref 0–200)
CO2 SERPL-SCNC: 24.9 MMOL/L (ref 22–29)
CREAT SERPL-MCNC: 0.93 MG/DL (ref 0.76–1.27)
DEPRECATED RDW RBC AUTO: 48.1 FL (ref 37–54)
EGFRCR SERPLBLD CKD-EPI 2021: 79.5 ML/MIN/1.73
EOSINOPHIL # BLD AUTO: 0.09 10*3/MM3 (ref 0–0.4)
EOSINOPHIL NFR BLD AUTO: 1.4 % (ref 0.3–6.2)
ERYTHROCYTE [DISTWIDTH] IN BLOOD BY AUTOMATED COUNT: 13.7 % (ref 12.3–15.4)
GLOBULIN UR ELPH-MCNC: 2.7 GM/DL
GLUCOSE SERPL-MCNC: 99 MG/DL (ref 65–99)
HCT VFR BLD AUTO: 41.4 % (ref 37.5–51)
HDLC SERPL-MCNC: 35 MG/DL (ref 40–60)
HGB BLD-MCNC: 14.1 G/DL (ref 13–17.7)
IMM GRANULOCYTES # BLD AUTO: 0.03 10*3/MM3 (ref 0–0.05)
IMM GRANULOCYTES NFR BLD AUTO: 0.5 % (ref 0–0.5)
IRON 24H UR-MRATE: 131 MCG/DL (ref 59–158)
IRON SATN MFR SERPL: 51 % (ref 20–50)
LDLC SERPL CALC-MCNC: 71 MG/DL (ref 0–100)
LDLC/HDLC SERPL: 1.99 {RATIO}
LYMPHOCYTES # BLD AUTO: 1.27 10*3/MM3 (ref 0.7–3.1)
LYMPHOCYTES NFR BLD AUTO: 19.9 % (ref 19.6–45.3)
MCH RBC QN AUTO: 33.2 PG (ref 26.6–33)
MCHC RBC AUTO-ENTMCNC: 34.1 G/DL (ref 31.5–35.7)
MCV RBC AUTO: 97.4 FL (ref 79–97)
MONOCYTES # BLD AUTO: 0.57 10*3/MM3 (ref 0.1–0.9)
MONOCYTES NFR BLD AUTO: 8.9 % (ref 5–12)
NEUTROPHILS NFR BLD AUTO: 4.4 10*3/MM3 (ref 1.7–7)
NEUTROPHILS NFR BLD AUTO: 68.8 % (ref 42.7–76)
NRBC BLD AUTO-RTO: 0 /100 WBC (ref 0–0.2)
PLATELET # BLD AUTO: 184 10*3/MM3 (ref 140–450)
PMV BLD AUTO: 11.7 FL (ref 6–12)
POTASSIUM SERPL-SCNC: 3.5 MMOL/L (ref 3.5–5.2)
PROT SERPL-MCNC: 6.2 G/DL (ref 6–8.5)
RBC # BLD AUTO: 4.25 10*6/MM3 (ref 4.14–5.8)
SODIUM SERPL-SCNC: 141 MMOL/L (ref 136–145)
TIBC SERPL-MCNC: 255 MCG/DL (ref 298–536)
TRANSFERRIN SERPL-MCNC: 171 MG/DL (ref 200–360)
TRIGL SERPL-MCNC: 101 MG/DL (ref 0–150)
VLDLC SERPL-MCNC: 19 MG/DL (ref 5–40)
WBC NRBC COR # BLD AUTO: 6.39 10*3/MM3 (ref 3.4–10.8)

## 2024-12-19 PROCEDURE — 1125F AMNT PAIN NOTED PAIN PRSNT: CPT | Performed by: NURSE PRACTITIONER

## 2024-12-19 PROCEDURE — 80061 LIPID PANEL: CPT | Performed by: NURSE PRACTITIONER

## 2024-12-19 PROCEDURE — 1159F MED LIST DOCD IN RCRD: CPT | Performed by: NURSE PRACTITIONER

## 2024-12-19 PROCEDURE — 1160F RVW MEDS BY RX/DR IN RCRD: CPT | Performed by: NURSE PRACTITIONER

## 2024-12-19 PROCEDURE — 85025 COMPLETE CBC W/AUTO DIFF WBC: CPT | Performed by: NURSE PRACTITIONER

## 2024-12-19 PROCEDURE — 99213 OFFICE O/P EST LOW 20 MIN: CPT | Performed by: NURSE PRACTITIONER

## 2024-12-19 PROCEDURE — 83540 ASSAY OF IRON: CPT | Performed by: NURSE PRACTITIONER

## 2024-12-19 PROCEDURE — 36415 COLL VENOUS BLD VENIPUNCTURE: CPT

## 2024-12-19 PROCEDURE — 1170F FXNL STATUS ASSESSED: CPT | Performed by: NURSE PRACTITIONER

## 2024-12-19 PROCEDURE — 84466 ASSAY OF TRANSFERRIN: CPT | Performed by: NURSE PRACTITIONER

## 2024-12-19 PROCEDURE — G0439 PPPS, SUBSEQ VISIT: HCPCS | Performed by: NURSE PRACTITIONER

## 2024-12-19 PROCEDURE — 80053 COMPREHEN METABOLIC PANEL: CPT | Performed by: NURSE PRACTITIONER

## 2024-12-19 NOTE — PATIENT INSTRUCTIONS
As discussed, give 1/2 tablet, and take 5 mg of amlodipine once daily - monitor his blood pressure, call if readings become elevated - equal to or greater than 140 on the top (systolic), or equal to or greater than 90 on the bottom (diastolic)    It is ok to stop the supplements as discussed - fish oil, vitamin B12    I recommend continuing the Vitamin D, the vitamin B12 may also be helping with his energy level    We will evaluate the need for oral iron with the labs

## 2024-12-19 NOTE — PROGRESS NOTES
Subjective   The ABCs of the Annual Wellness Visit  Medicare Wellness Visit      Royal Beckman is a 89 y.o. patient who presents for a Medicare Wellness Visit.    The following portions of the patient's history were reviewed and   updated as appropriate: allergies, current medications, past family history, past medical history, past social history, past surgical history, and problem list.    Compared to one year ago, the patient's physical   health is worse.  Compared to one year ago, the patient's mental   health is worse.    Recent Hospitalizations:  He was not admitted to the hospital during the last year.     Current Medical Providers:  Patient Care Team:  Edin Mckeon APRN as PCP - General (Family Medicine)  Sanjiv Bae MD as Consulting Physician (Cardiology)  Kelly Maravilal RN as Ambulatory  (Population Health)    Outpatient Medications Prior to Visit   Medication Sig Dispense Refill    amLODIPine (NORVASC) 10 MG tablet TAKE ONE TABLET BY MOUTH EVERY DAY 90 tablet 2    aspirin 81 MG tablet ASPIRIN 81 MG ORAL TABLET      atenolol (TENORMIN) 25 MG tablet TAKE 1/2 TABLET BY MOUTH EVERY DAY 45 tablet 3    atorvastatin (LIPITOR) 20 MG tablet Take 1 tablet by mouth Daily. 90 tablet 3    Cyanocobalamin (B-12) 1000 MCG capsule B-12 1000 MCG CAPS      Diclofenac Sodium (VOLTAREN) 1 % gel gel Apply 4 g topically to the appropriate area as directed 3 (Three) Times a Day As Needed (pain). 100 g 0    donepezil (Aricept) 10 MG tablet Take 1 tablet by mouth 2 (Two) Times a Day. 180 tablet 3    memantine (NAMENDA) 10 MG tablet One daily for one month then one bid 60 tablet 11    niacin 500 MG tablet NIACIN 500 MG TABS      Omega-3 Fatty Acids ([employer] FISH OIL) 1200 MG capsule [employer] FISH OIL 1200 MG CAPS      sertraline (Zoloft) 50 MG tablet Take 1 tablet by mouth Daily. 30 tablet 11    vitamin D3 125 MCG (5000 UT) capsule capsule Take 1 capsule by mouth Daily.      memantine (NAMENDA) 5 MG  "tablet TAKE 1 TABLET BY MOUTH DAILY FOR 30 DAYS THEN START THE 10 MG PRESCRIPTION 30 tablet 0     No facility-administered medications prior to visit.     No opioid medication identified on active medication list. I have reviewed chart for other potential  high risk medication/s and harmful drug interactions in the elderly.      Aspirin is on active medication list. Aspirin use is indicated based on review of current medical condition/s. Pros and cons of this therapy have been discussed today. Benefits of this medication outweigh potential harm.  Patient has been encouraged to continue taking this medication.  .      Patient Active Problem List   Diagnosis    Chronic coronary artery disease    Hypertension    Old myocardial infarction    Asymptomatic varicose veins of lower extremity    Benign prostatic hyperplasia    Hyperglycemia    Leg edema    Obesity with body mass index 30 or greater     Advance Care Planning Advance Directive is not on file.  ACP discussion was held with the patient during this visit. Patient does not have an advance directive, information provided.        Objective   Vitals:    12/19/24 1014   BP: 102/68   Pulse: 53   SpO2: 97%   Weight: 83.7 kg (184 lb 9.6 oz)   Height: 167.6 cm (65.98\")   PainSc:   2   PainLoc: Back  Comment: [place]       Estimated body mass index is 29.81 kg/m² as calculated from the following:    Height as of this encounter: 167.6 cm (65.98\").    Weight as of this encounter: 83.7 kg (184 lb 9.6 oz).           Gait and Balance Evaluation:  Slow Tentative Pace, Needs Assistance, and Walker   Does the patient have evidence of cognitive impairment? No                                                                                                Health  Risk Assessment    Smoking Status:  Social History     Tobacco Use   Smoking Status Former    Current packs/day: 0.00    Average packs/day: 1.5 packs/day for 5.2 years (7.8 ttl pk-yrs)    Types: Cigarettes    Start date: " 10/10/1964    Quit date: 12/15/1969    Years since quittin.0    Passive exposure: Past   Smokeless Tobacco Never     Alcohol Consumption:  Social History     Substance and Sexual Activity   Alcohol Use Yes    Comment: occasional, only       Fall Risk Screen  KRISADI Fall Risk Assessment was completed, and patient is at HIGH risk for falls. Assessment completed on:2024    Depression Screening   Little interest or pleasure in doing things? Several days   Feeling down, depressed, or hopeless? Several days   PHQ-2 Total Score 2   Trouble falling or staying asleep, or sleeping too much? Not at all   Feeling tired or having little energy? Almost all   Poor appetite or overeating? Almost all   Feeling bad about yourself - or that you are a failure or have let yourself or your family down? Several days   Trouble concentrating on things, such as reading the newspaper or watching television? Over half   Moving or speaking so slowly that other people could have noticed? Or the opposite - being so fidgety or restless that you have been moving around a lot more than usual? Several days   Thoughts that you would be better off dead, or of hurting yourself in some way? Not at all   PHQ-9 Total Score 12   If you checked off any problems, how difficult have these problems made it for you to do your work, take care of things at home, or get along with other people? Not difficult at all      Health Habits and Functional and Cognitive Screenin/19/2024    10:09 AM   Functional & Cognitive Status   Do you have difficulty preparing food and eating? No   Do you have difficulty bathing yourself, getting dressed or grooming yourself? No   Do you have difficulty using the toilet? No   Do you have difficulty moving around from place to place? No   Do you have trouble with steps or getting out of a bed or a chair? Yes   Current Diet Frequent Junk Food   Dental Exam Up to date   Eye Exam Up to date   Exercise (times per week)  7 times per week   Current Exercises Include Walking   Do you need help using the phone?  No   Are you deaf or do you have serious difficulty hearing?  Yes   Do you need help to go to places out of walking distance? No   Do you need help shopping? No   Do you need help preparing meals?  No   Do you need help with housework?  No   Do you need help with laundry? No   Do you need help taking your medications? Yes   Do you need help managing money? Yes   Do you ever drive or ride in a car without wearing a seat belt? Yes   Have you felt unusual stress, anger or loneliness in the last month? Yes   Who do you live with? Spouse   If you need help, do you have trouble finding someone available to you? No   Have you been bothered in the last four weeks by sexual problems? No   Do you have difficulty concentrating, remembering or making decisions? Yes           Age-appropriate Screening Schedule:  Refer to the list below for future screening recommendations based on patient's age, sex and/or medical conditions. Orders for these recommended tests are listed in the plan section. The patient has been provided with a written plan.    Health Maintenance List  Health Maintenance   Topic Date Due    Pneumococcal Vaccine 65+ (1 of 2 - PCV) Never done    TDAP/TD VACCINES (1 - Tdap) Never done    ZOSTER VACCINE (1 of 2) Never done    RSV Vaccine - Adults (1 - 1-dose 75+ series) Never done    COVID-19 Vaccine (1 - 2024-25 season) Never done    LIPID PANEL  02/28/2025    BMI FOLLOWUP  10/23/2025    ANNUAL WELLNESS VISIT  12/19/2025    INFLUENZA VACCINE  Completed                                                                                                                                                CMS Preventative Services Quick Reference  Risk Factors Identified During Encounter  None Identified    The above risks/problems have been discussed with the patient.  Pertinent information has been shared with the patient in the After  "Visit Summary.  An After Visit Summary and PPPS were made available to the patient.    Follow Up:   Next Medicare Wellness visit to be scheduled in 1 year.     Additional E&M Note during same encounter follows:  Patient has additional, significant, and separately identifiable condition(s)/problem(s) that require work above and beyond the Medicare Wellness Visit     Chief Complaint  Medicare Wellness-subsequent    Subjective   HPI  Royal is also being seen today for additional medical problem/s - Hypertension    BP tends to run low, his wife would like to discontinue some of his medicines  Will decrease amlodipine from 10 mg to 5 mg daily    She does not want to wean the sertraline, she is worried that his mood will be adversely affected without it    She is also concerned about his worsening dementia and is exploring options for care including assistance in the home and assisted living or nursing home placement    Review of Systems   Respiratory: Negative.     Cardiovascular: Negative.    Gastrointestinal:  Negative for abdominal pain, constipation and diarrhea.        Is incontinent of stools   Genitourinary:         Is incontinent of urine   Neurological:  Positive for weakness.        Lower extremity weakness, is in PT, does not do exercises at home    Upper extremity strength is reported to be good   Psychiatric/Behavioral:  Positive for sleep disturbance. Negative for dysphoric mood. The patient is not nervous/anxious.         Wife endorses that he sleeps too much, often will not get out of bed        Objective   Vital Signs:  /68   Pulse 53   Ht 167.6 cm (65.98\")   Wt 83.7 kg (184 lb 9.6 oz)   SpO2 97%   BMI 29.81 kg/m²   Physical Exam  Vitals reviewed.   Constitutional:       Appearance: Normal appearance. He is obese.   Neck:      Vascular: No carotid bruit.   Cardiovascular:      Rate and Rhythm: Normal rate and regular rhythm.      Heart sounds: Normal heart sounds.   Pulmonary:      Effort: " Pulmonary effort is normal.      Breath sounds: Normal breath sounds.   Abdominal:      General: Bowel sounds are normal.      Palpations: Abdomen is soft.      Tenderness: There is no abdominal tenderness. There is no right CVA tenderness or left CVA tenderness.   Musculoskeletal:      Cervical back: Neck supple. No tenderness.      Right lower leg: No edema.      Left lower leg: No edema.   Lymphadenopathy:      Cervical: No cervical adenopathy.   Skin:     General: Skin is warm.   Neurological:      Mental Status: He is alert and oriented to person, place, and time.   Psychiatric:         Behavior: Behavior normal.                     Assessment and Plan        Medicare annual wellness visit, subsequent    Orders:    Lipid panel; Future    CBC w AUTO Differential; Future    Comprehensive metabolic panel; Future    Primary hypertension  Hypertension is stable and controlled  Medication changes per orders.  Blood pressure will be reassessed in 6 months.    Orders:    Comprehensive metabolic panel; Future    History of iron deficiency    Orders:    Iron and TIBC; Future            Follow Up   Return in about 6 months (around 6/19/2025) for Recheck BP.  Patient was given instructions and counseling regarding his condition or for health maintenance advice. Please see specific information pulled into the AVS if appropriate.

## 2024-12-19 NOTE — ASSESSMENT & PLAN NOTE
Hypertension is stable and controlled  Medication changes per orders.  Blood pressure will be reassessed in 6 months.    Orders:    Comprehensive metabolic panel; Future

## 2024-12-27 ENCOUNTER — APPOINTMENT (OUTPATIENT)
Dept: CT IMAGING | Facility: HOSPITAL | Age: 89
End: 2024-12-27
Payer: MEDICARE

## 2024-12-27 ENCOUNTER — HOSPITAL ENCOUNTER (INPATIENT)
Facility: HOSPITAL | Age: 89
LOS: 4 days | Discharge: HOSPICE | End: 2024-12-31
Attending: EMERGENCY MEDICINE | Admitting: INTERNAL MEDICINE
Payer: MEDICARE

## 2024-12-27 ENCOUNTER — APPOINTMENT (OUTPATIENT)
Dept: GENERAL RADIOLOGY | Facility: HOSPITAL | Age: 89
End: 2024-12-27
Payer: MEDICARE

## 2024-12-27 DIAGNOSIS — R00.1 SYMPTOMATIC BRADYCARDIA: Primary | ICD-10-CM

## 2024-12-27 LAB
ABO GROUP BLD: NORMAL
ALBUMIN SERPL-MCNC: 3.3 G/DL (ref 3.5–5.2)
ALBUMIN/GLOB SERPL: 1.3 G/DL
ALP SERPL-CCNC: 115 U/L (ref 39–117)
ALT SERPL W P-5'-P-CCNC: 8 U/L (ref 1–41)
ANION GAP SERPL CALCULATED.3IONS-SCNC: 9.3 MMOL/L (ref 5–15)
APTT PPP: 30.6 SECONDS (ref 22.7–35.4)
AST SERPL-CCNC: 21 U/L (ref 1–40)
BASOPHILS # BLD AUTO: 0.03 10*3/MM3 (ref 0–0.2)
BASOPHILS NFR BLD AUTO: 0.6 % (ref 0–1.5)
BILIRUB SERPL-MCNC: 0.8 MG/DL (ref 0–1.2)
BLD GP AB SCN SERPL QL: NEGATIVE
BUN SERPL-MCNC: 14 MG/DL (ref 8–23)
BUN/CREAT SERPL: 17.3 (ref 7–25)
CALCIUM SPEC-SCNC: 9.2 MG/DL (ref 8.6–10.5)
CHLORIDE SERPL-SCNC: 106 MMOL/L (ref 98–107)
CO2 SERPL-SCNC: 26.7 MMOL/L (ref 22–29)
CREAT SERPL-MCNC: 0.81 MG/DL (ref 0.76–1.27)
DEPRECATED RDW RBC AUTO: 52.6 FL (ref 37–54)
EGFRCR SERPLBLD CKD-EPI 2021: 85.3 ML/MIN/1.73
EOSINOPHIL # BLD AUTO: 0.12 10*3/MM3 (ref 0–0.4)
EOSINOPHIL NFR BLD AUTO: 2.2 % (ref 0.3–6.2)
ERYTHROCYTE [DISTWIDTH] IN BLOOD BY AUTOMATED COUNT: 14.4 % (ref 12.3–15.4)
FLUAV SUBTYP SPEC NAA+PROBE: NOT DETECTED
FLUBV RNA ISLT QL NAA+PROBE: NOT DETECTED
GEN 5 1HR TROPONIN T REFLEX: 31 NG/L
GLOBULIN UR ELPH-MCNC: 2.6 GM/DL
GLUCOSE BLDC GLUCOMTR-MCNC: 78 MG/DL (ref 70–105)
GLUCOSE SERPL-MCNC: 83 MG/DL (ref 65–99)
HCT VFR BLD AUTO: 40.6 % (ref 37.5–51)
HGB BLD-MCNC: 13.6 G/DL (ref 13–17.7)
IMM GRANULOCYTES # BLD AUTO: 0.01 10*3/MM3 (ref 0–0.05)
IMM GRANULOCYTES NFR BLD AUTO: 0.2 % (ref 0–0.5)
INR PPP: 1.08 (ref 0.9–1.1)
LYMPHOCYTES # BLD AUTO: 1.44 10*3/MM3 (ref 0.7–3.1)
LYMPHOCYTES NFR BLD AUTO: 26.7 % (ref 19.6–45.3)
MAGNESIUM SERPL-MCNC: 2 MG/DL (ref 1.6–2.4)
MCH RBC QN AUTO: 33.3 PG (ref 26.6–33)
MCHC RBC AUTO-ENTMCNC: 33.5 G/DL (ref 31.5–35.7)
MCV RBC AUTO: 99.5 FL (ref 79–97)
MONOCYTES # BLD AUTO: 0.49 10*3/MM3 (ref 0.1–0.9)
MONOCYTES NFR BLD AUTO: 9.1 % (ref 5–12)
NEUTROPHILS NFR BLD AUTO: 3.3 10*3/MM3 (ref 1.7–7)
NEUTROPHILS NFR BLD AUTO: 61.2 % (ref 42.7–76)
NRBC BLD AUTO-RTO: 0 /100 WBC (ref 0–0.2)
NT-PROBNP SERPL-MCNC: 383 PG/ML (ref 0–1800)
PLATELET # BLD AUTO: 163 10*3/MM3 (ref 140–450)
PMV BLD AUTO: 10.6 FL (ref 6–12)
POTASSIUM SERPL-SCNC: 3.9 MMOL/L (ref 3.5–5.2)
PROT SERPL-MCNC: 5.9 G/DL (ref 6–8.5)
PROTHROMBIN TIME: 14 SECONDS (ref 11.7–14.2)
RBC # BLD AUTO: 4.08 10*6/MM3 (ref 4.14–5.8)
RH BLD: POSITIVE
SARS-COV-2 RNA RESP QL NAA+PROBE: NOT DETECTED
SODIUM SERPL-SCNC: 142 MMOL/L (ref 136–145)
T&S EXPIRATION DATE: NORMAL
T4 FREE SERPL-MCNC: 1.04 NG/DL (ref 0.93–1.7)
TROPONIN T % DELTA: 11 %
TROPONIN T NUMERIC DELTA: 3 NG/L
TROPONIN T SERPL HS-MCNC: 28 NG/L
TSH SERPL DL<=0.05 MIU/L-ACNC: 1.34 UIU/ML (ref 0.27–4.2)
WBC NRBC COR # BLD AUTO: 5.39 10*3/MM3 (ref 3.4–10.8)
WHOLE BLOOD HOLD COAG: NORMAL

## 2024-12-27 PROCEDURE — 83735 ASSAY OF MAGNESIUM: CPT | Performed by: EMERGENCY MEDICINE

## 2024-12-27 PROCEDURE — 86850 RBC ANTIBODY SCREEN: CPT | Performed by: EMERGENCY MEDICINE

## 2024-12-27 PROCEDURE — 99223 1ST HOSP IP/OBS HIGH 75: CPT | Performed by: STUDENT IN AN ORGANIZED HEALTH CARE EDUCATION/TRAINING PROGRAM

## 2024-12-27 PROCEDURE — 71045 X-RAY EXAM CHEST 1 VIEW: CPT

## 2024-12-27 PROCEDURE — 93005 ELECTROCARDIOGRAM TRACING: CPT | Performed by: EMERGENCY MEDICINE

## 2024-12-27 PROCEDURE — 4A03X5D MEASUREMENT OF ARTERIAL FLOW, INTRACRANIAL, EXTERNAL APPROACH: ICD-10-PCS | Performed by: RADIOLOGY

## 2024-12-27 PROCEDURE — 82948 REAGENT STRIP/BLOOD GLUCOSE: CPT

## 2024-12-27 PROCEDURE — 25510000001 IOPAMIDOL PER 1 ML: Performed by: EMERGENCY MEDICINE

## 2024-12-27 PROCEDURE — 85730 THROMBOPLASTIN TIME PARTIAL: CPT | Performed by: EMERGENCY MEDICINE

## 2024-12-27 PROCEDURE — 36415 COLL VENOUS BLD VENIPUNCTURE: CPT

## 2024-12-27 PROCEDURE — 92610 EVALUATE SWALLOWING FUNCTION: CPT

## 2024-12-27 PROCEDURE — 86900 BLOOD TYPING SEROLOGIC ABO: CPT | Performed by: EMERGENCY MEDICINE

## 2024-12-27 PROCEDURE — 70450 CT HEAD/BRAIN W/O DYE: CPT

## 2024-12-27 PROCEDURE — 85610 PROTHROMBIN TIME: CPT | Performed by: EMERGENCY MEDICINE

## 2024-12-27 PROCEDURE — 70496 CT ANGIOGRAPHY HEAD: CPT

## 2024-12-27 PROCEDURE — 87636 SARSCOV2 & INF A&B AMP PRB: CPT | Performed by: EMERGENCY MEDICINE

## 2024-12-27 PROCEDURE — 84443 ASSAY THYROID STIM HORMONE: CPT | Performed by: EMERGENCY MEDICINE

## 2024-12-27 PROCEDURE — 84484 ASSAY OF TROPONIN QUANT: CPT | Performed by: EMERGENCY MEDICINE

## 2024-12-27 PROCEDURE — 70498 CT ANGIOGRAPHY NECK: CPT

## 2024-12-27 PROCEDURE — 85025 COMPLETE CBC W/AUTO DIFF WBC: CPT | Performed by: EMERGENCY MEDICINE

## 2024-12-27 PROCEDURE — 80053 COMPREHEN METABOLIC PANEL: CPT | Performed by: EMERGENCY MEDICINE

## 2024-12-27 PROCEDURE — 84439 ASSAY OF FREE THYROXINE: CPT | Performed by: EMERGENCY MEDICINE

## 2024-12-27 PROCEDURE — 86901 BLOOD TYPING SEROLOGIC RH(D): CPT | Performed by: EMERGENCY MEDICINE

## 2024-12-27 PROCEDURE — 86900 BLOOD TYPING SEROLOGIC ABO: CPT

## 2024-12-27 PROCEDURE — 83880 ASSAY OF NATRIURETIC PEPTIDE: CPT | Performed by: EMERGENCY MEDICINE

## 2024-12-27 PROCEDURE — 86901 BLOOD TYPING SEROLOGIC RH(D): CPT

## 2024-12-27 PROCEDURE — 99285 EMERGENCY DEPT VISIT HI MDM: CPT

## 2024-12-27 RX ORDER — BISACODYL 5 MG/1
5 TABLET, DELAYED RELEASE ORAL DAILY PRN
Status: DISCONTINUED | OUTPATIENT
Start: 2024-12-27 | End: 2024-12-31 | Stop reason: HOSPADM

## 2024-12-27 RX ORDER — SODIUM CHLORIDE 0.9 % (FLUSH) 0.9 %
10 SYRINGE (ML) INJECTION AS NEEDED
Status: DISCONTINUED | OUTPATIENT
Start: 2024-12-27 | End: 2024-12-31 | Stop reason: HOSPADM

## 2024-12-27 RX ORDER — ACETAMINOPHEN 650 MG/1
650 SUPPOSITORY RECTAL EVERY 4 HOURS PRN
Status: DISCONTINUED | OUTPATIENT
Start: 2024-12-27 | End: 2024-12-31 | Stop reason: HOSPADM

## 2024-12-27 RX ORDER — IOPAMIDOL 755 MG/ML
100 INJECTION, SOLUTION INTRAVASCULAR
Status: COMPLETED | OUTPATIENT
Start: 2024-12-27 | End: 2024-12-27

## 2024-12-27 RX ORDER — ACETAMINOPHEN 325 MG/1
650 TABLET ORAL EVERY 4 HOURS PRN
Status: DISCONTINUED | OUTPATIENT
Start: 2024-12-27 | End: 2024-12-31 | Stop reason: HOSPADM

## 2024-12-27 RX ORDER — AMOXICILLIN 250 MG
2 CAPSULE ORAL 2 TIMES DAILY PRN
Status: DISCONTINUED | OUTPATIENT
Start: 2024-12-27 | End: 2024-12-31 | Stop reason: HOSPADM

## 2024-12-27 RX ORDER — MEMANTINE HYDROCHLORIDE 10 MG/1
10 TABLET ORAL 2 TIMES DAILY
COMMUNITY

## 2024-12-27 RX ORDER — POLYETHYLENE GLYCOL 3350 17 G/17G
17 POWDER, FOR SOLUTION ORAL DAILY PRN
Status: DISCONTINUED | OUTPATIENT
Start: 2024-12-27 | End: 2024-12-31 | Stop reason: HOSPADM

## 2024-12-27 RX ORDER — BISACODYL 10 MG
10 SUPPOSITORY, RECTAL RECTAL DAILY PRN
Status: DISCONTINUED | OUTPATIENT
Start: 2024-12-27 | End: 2024-12-31 | Stop reason: HOSPADM

## 2024-12-27 RX ORDER — SODIUM CHLORIDE 0.9 % (FLUSH) 0.9 %
10 SYRINGE (ML) INJECTION EVERY 12 HOURS SCHEDULED
Status: DISCONTINUED | OUTPATIENT
Start: 2024-12-27 | End: 2024-12-31 | Stop reason: HOSPADM

## 2024-12-27 RX ORDER — SODIUM CHLORIDE 9 MG/ML
40 INJECTION, SOLUTION INTRAVENOUS AS NEEDED
Status: DISCONTINUED | OUTPATIENT
Start: 2024-12-27 | End: 2024-12-31 | Stop reason: HOSPADM

## 2024-12-27 RX ORDER — ACETAMINOPHEN 160 MG/5ML
650 SOLUTION ORAL EVERY 4 HOURS PRN
Status: DISCONTINUED | OUTPATIENT
Start: 2024-12-27 | End: 2024-12-31 | Stop reason: HOSPADM

## 2024-12-27 RX ORDER — NITROGLYCERIN 0.4 MG/1
0.4 TABLET SUBLINGUAL
Status: DISCONTINUED | OUTPATIENT
Start: 2024-12-27 | End: 2024-12-31 | Stop reason: HOSPADM

## 2024-12-27 RX ORDER — ACETAMINOPHEN 500 MG
1000 TABLET ORAL ONCE
Status: COMPLETED | OUTPATIENT
Start: 2024-12-27 | End: 2024-12-27

## 2024-12-27 RX ADMIN — Medication 10 ML: at 23:09

## 2024-12-27 RX ADMIN — ACETAMINOPHEN 1000 MG: 500 TABLET, FILM COATED ORAL at 13:25

## 2024-12-27 RX ADMIN — IOPAMIDOL 100 ML: 755 INJECTION, SOLUTION INTRAVENOUS at 11:31

## 2024-12-27 NOTE — H&P
[facility] Medicine Services  History & Physical    Patient Name: Royal Beckman  : 1935  MRN: [id number]  Primary Care Physician:  Edin Mckeon APRN  Date of admission: 2024  Date and Time of Service: 2024 at 1356    Subjective      Chief Complaint: weakness    History of Present Illness: Royal Beckman is a 89 y.o. male with a CMH of hypertension, hyperlipidemia, brain aneurysm, dementia who presented to [facility] on 2024 with weakness for the past few days, as well as back pain. Patient reportedly had not taken his medications for the past 2-3 days. Patient was noted to be bradycardic with heart rate in 40s while with EMS and was given atropine with improvement in HR to 40s.     In the ED: patient had an episode with headache and tremors, code stroke called. Stroke protocol was initiated. CTA head.neck showed stable aneurysm. Neurology was consulted. Pertinent labs include HS trop 31, 28, CHEM unremarkable, TSH 1.340, T4 1.04, PT 14.0, INR 1.08, MCV 99.5, MCH 33.3. Covid/Flu PCR negative, CXR negative. EKG shows SR with LBBB. Patient received tylenol 1 G. Hospitalist team to admit at this time for further management.     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 Otherwise pertinent FHx was reviewed and not pertinent to current issue.    Social History:  reports that he quit smoking about 55 years ago. His smoking use included cigarettes. He started smoking about 60 years ago. He has a 7.8 pack-year smoking history. He has been exposed to tobacco smoke. He has never used smokeless tobacco. He reports current alcohol use. He reports that he does not use drugs.    Home Medications:  Prior to Admission Medications       Prescriptions Last Dose Informant Patient Reported? Taking?    amLODIPine (NORVASC) 10 MG tablet   No No    TAKE ONE TABLET BY MOUTH EVERY DAY    aspirin 81 MG tablet   Yes No    ASPIRIN 81 MG ORAL TABLET    atenolol (TENORMIN) 25 MG tablet   No No    TAKE 1/2 TABLET BY MOUTH EVERY DAY    atorvastatin (LIPITOR) 20 MG tablet   No No    Take 1 tablet by mouth Daily.    Cyanocobalamin (B-12) 1000 MCG capsule   Yes No    B-12 1000 MCG CAPS    Diclofenac Sodium (VOLTAREN) 1 % gel gel   No No    Apply 4 g topically to the appropriate area as directed 3 (Three) Times a Day As Needed (pain).    donepezil (Aricept) 10 MG tablet   No No    Take 1 tablet by mouth 2 (Two) Times a Day.    memantine (NAMENDA) 10 MG tablet   No No    One daily for one month then one bid    niacin 500 MG tablet   Yes No    NIACIN 500 MG TABS    Omega-3 Fatty Acids ([employer] FISH OIL) 1200 MG capsule   Yes No    [employer] FISH OIL 1200 MG CAPS    sertraline (Zoloft) 50 MG tablet   No No    Take 1 tablet by mouth Daily.    vitamin D3 125 MCG (5000 UT) capsule capsule   Yes No    Take 1 capsule by mouth Daily.              Allergies:  No Known Allergies    Objective      Vitals:   Temp:  [97.7 °F (36.5 °C)-98.5 °F (36.9 °C)] 97.8 °F (36.6 °C)  Heart Rate:  [40-72] 40  Resp:  [11-30] 11  BP: (103-138)/(58-90) 133/58  Body mass index is 30.19 kg/m².  Physical Exam  Constitutional:       Appearance: He is obese.   HENT:       Head: Normocephalic and atraumatic.      Right Ear: Decreased hearing noted.      Left Ear: Decreased hearing noted.      Nose: Nose normal.      Mouth/Throat:      Mouth: Mucous membranes are moist.      Pharynx: Oropharynx is clear.   Eyes:      Extraocular Movements: Extraocular movements intact.      Conjunctiva/sclera: Conjunctivae normal.      Pupils: Pupils are equal, round, and reactive to light.   Cardiovascular:      Rate and Rhythm: Normal rate and regular rhythm.      Pulses: Normal pulses.      Heart sounds: Normal heart sounds.   Pulmonary:      Effort: Pulmonary effort is normal.      Breath sounds: Examination of the right-lower field reveals decreased breath sounds. Examination of the left-lower field reveals decreased breath sounds. Decreased breath sounds present.   Abdominal:      General: Bowel sounds are normal.      Palpations: Abdomen is soft.   Musculoskeletal:         General: Normal range of motion.      Cervical back: Neck supple.   Skin:     General: Skin is warm and dry.   Neurological:      Mental Status: He is alert. Mental status is at baseline.   Psychiatric:         Behavior: Behavior normal.         Diagnostic Data:  Lab Results (last 24 hours)       Procedure Component Value Units Date/Time    High Sensitivity Troponin T 1Hr [[id number]]  (Abnormal) Collected: 12/27/24 1156    Specimen: Blood Updated: 12/27/24 1216     HS Troponin T 31 ng/L      Troponin T Numeric Delta 3 ng/L      Troponin T % Delta 11 %     Narrative:      High Sensitive Troponin T Reference Range:  <14.0 ng/L- Negative Female for AMI  <22.0 ng/L- Negative Male for AMI  >=14 - Abnormal Female indicating possible myocardial injury.  >=22 - Abnormal Male indicating possible myocardial injury.   Clinicians would have to utilize clinical acumen, EKG, Troponin, and serial changes to determine if it is an Acute Myocardial Infarction or myocardial injury due to an underlying chronic condition.         Protime-INR  [[id number]]  (Normal) Collected: 12/27/24 1042    Specimen: Blood Updated: 12/27/24 1128     Protime 14.0 Seconds      INR 1.08    aPTT [[id number]]  (Normal) Collected: 12/27/24 1042    Specimen: Blood Updated: 12/27/24 1128     PTT 30.6 seconds     Magnesium [[id number]]  (Normal) Collected: 12/27/24 1042    Specimen: Blood Updated: 12/27/24 1120     Magnesium 2.0 mg/dL     Comprehensive Metabolic Panel [[id number]]  (Abnormal) Collected: 12/27/24 1042    Specimen: Blood Updated: 12/27/24 1120     Glucose 83 mg/dL      BUN 14 mg/dL      Creatinine 0.81 mg/dL      Sodium 142 mmol/L      Potassium 3.9 mmol/L      Comment: Slight hemolysis detected by analyzer. Result may be falsely elevated.        Chloride 106 mmol/L      CO2 26.7 mmol/L      Calcium 9.2 mg/dL      Total Protein 5.9 g/dL      Albumin 3.3 g/dL      ALT (SGPT) 8 U/L      AST (SGOT) 21 U/L      Alkaline Phosphatase 115 U/L      Total Bilirubin 0.8 mg/dL      Globulin 2.6 gm/dL      A/G Ratio 1.3 g/dL      BUN/Creatinine Ratio 17.3     Anion Gap 9.3 mmol/L      eGFR 85.3 mL/min/1.73     Narrative:      GFR Categories in Chronic Kidney Disease (CKD)      GFR Category          GFR (mL/min/1.73)    Interpretation  G1                     90 or greater         Normal or high (1)  G2                      60-89                Mild decrease (1)  G3a                   45-59                Mild to moderate decrease  G3b                   30-44                Moderate to severe decrease  G4                    15-29                Severe decrease  G5                    14 or less           Kidney failure          (1)In the absence of evidence of kidney disease, neither GFR category G1 or G2 fulfill the criteria for CKD.    eGFR calculation 2021 CKD-EPI creatinine equation, which does not include race as a factor    TSH [[id number]]  (Normal) Collected: 12/27/24 1042    Specimen: Blood Updated: 12/27/24 1119     TSH 1.340 uIU/mL     High Sensitivity Troponin T  [[id number]]  (Abnormal) Collected: 12/27/24 1042    Specimen: Blood Updated: 12/27/24 1119     HS Troponin T 28 ng/L     Narrative:      High Sensitive Troponin T Reference Range:  <14.0 ng/L- Negative Female for AMI  <22.0 ng/L- Negative Male for AMI  >=14 - Abnormal Female indicating possible myocardial injury.  >=22 - Abnormal Male indicating possible myocardial injury.   Clinicians would have to utilize clinical acumen, EKG, Troponin, and serial changes to determine if it is an Acute Myocardial Infarction or myocardial injury due to an underlying chronic condition.         T4, Free [[id number]]  (Normal) Collected: 12/27/24 1042    Specimen: Blood Updated: 12/27/24 1119     Free T4 1.04 ng/dL     BNP [[id number]]  (Normal) Collected: 12/27/24 1042    Specimen: Blood Updated: 12/27/24 1119     proBNP 383.0 pg/mL     Narrative:      This assay is used as an aid in the diagnosis of individuals suspected of having heart failure. It can be used as an aid in the diagnosis of acute decompensated heart failure (ADHF) in patients presenting with signs and symptoms of ADHF to the emergency department (ED). In addition, NT-proBNP of <300 pg/mL indicates ADHF is not likely.    Age Range Result Interpretation  NT-proBNP Concentration (pg/mL:      <50             Positive            >450                   Gray                 300-450                    Negative             <300    50-75           Positive            >900                  Gray                300-900                  Negative            <300      >75             Positive            >1800                  Gray                300-1800                  Negative            <300    POC Glucose Once [[id number]]  (Normal) Collected: 12/27/24 1109    Specimen: Blood Updated: 12/27/24 1111     Glucose 78 mg/dL      Comment: Serial Number: [id number]:  [id number]       COVID-19 and FLU A/B PCR, 1 HR TAT - Swab, Nasopharynx [[id number]]  (Normal) Collected: 12/27/24  1042    Specimen: Swab from Nasopharynx Updated: 12/27/24 1107     COVID19 Not Detected     Influenza A PCR Not Detected     Influenza B PCR Not Detected    Narrative:      Fact sheet for providers: https://www.fda.gov/media/293239/download    Fact sheet for patients: https://www.fda.gov/media/850799/download    Test performed by PCR.    Extra Tubes [[id number]] Collected: 12/27/24 1042    Specimen: Blood Updated: 12/27/24 1101    Narrative:      The following orders were created for panel order Extra Tubes.  Procedure                               Abnormality         Status                     ---------                               -----------         ------                     Light Blue Top[[id number]]                                   Final result                 Please view results for these tests on the individual orders.    Light Blue Top [[id number]] Collected: 12/27/24 1042    Specimen: Blood Updated: 12/27/24 1101     Extra Tube Hold for add-ons.     Comment: Auto resulted       CBC & Differential [[id number]]  (Abnormal) Collected: 12/27/24 1042    Specimen: Blood Updated: 12/27/24 1050    Narrative:      The following orders were created for panel order CBC & Differential.  Procedure                               Abnormality         Status                     ---------                               -----------         ------                     CBC Auto Differential[[id number]]        Abnormal            Final result                 Please view results for these tests on the individual orders.    CBC Auto Differential [[id number]]  (Abnormal) Collected: 12/27/24 1042    Specimen: Blood Updated: 12/27/24 1050     WBC 5.39 10*3/mm3      RBC 4.08 10*6/mm3      Hemoglobin 13.6 g/dL      Hematocrit 40.6 %      MCV 99.5 fL      MCH 33.3 pg      MCHC 33.5 g/dL      RDW 14.4 %      RDW-SD 52.6 fl      MPV 10.6 fL      Platelets 163 10*3/mm3      Neutrophil % 61.2 %      Lymphocyte % 26.7 %      Monocyte % 9.1 %       Eosinophil % 2.2 %      Basophil % 0.6 %      Immature Grans % 0.2 %      Neutrophils, Absolute 3.30 10*3/mm3      Lymphocytes, Absolute 1.44 10*3/mm3      Monocytes, Absolute 0.49 10*3/mm3      Eosinophils, Absolute 0.12 10*3/mm3      Basophils, Absolute 0.03 10*3/mm3      Immature Grans, Absolute 0.01 10*3/mm3      nRBC 0.0 /100 WBC              Imaging Results (Last 24 Hours)       Procedure Component Value Units Date/Time    CT Angiogram Head w AI Analysis of LVO [[id number]] Collected: 12/27/24 1234     Updated: 12/27/24 1250    Narrative:      CT ANGIOGRAM HEAD W AI ANALYSIS OF LVO, CT ANGIOGRAM NECK    Date of Exam: 12/27/2024 11:28 AM EST    Indication: Stroke, follow up  Neuro deficit, acute, stroke suspected  Acute Stroke.    Comparison: CTA head July 16, 2024    Technique: CTA of the head and neck was performed after the uneventful intravenous administration of iodinated contrast. Reconstructed coronal and sagittal images were also obtained. In addition, a 3-D volume rendered image was created for   interpretation. Automated exposure control and iterative reconstruction methods were used.      Findings:  There is hard plaque noted in the area of the carotid bifurcations/proximal internal carotid arteries. Degree of stenosis in the left internal carotid artery is around 40% by NASCET criteria and 36 % on the right by NASCET criteria. The left vertebral   artery is dominant. The right vertebral artery is diminutive in size and may end in a PICA. The basilar artery is grossly unremarkable. There does not appear to be significant abnormality of the middle cerebral arteries or posterior cerebral arteries.   There is lack of an A1 segment on the right which could relate to hypoplastic vessel.    As has been noted there is an aneurysm of the anterior communicating artery. This measures about 1.1 x 0.9 cm previously measuring about 1 x 0.83 cm. The A2 segments do not appear abnormal.    There is streak artifact  along the left cerebral hemisphere from cochlear implant.      Impression:      Impression:  1.Atherosclerotic changes are noted involving the carotid arteries.  2.Dominant left vertebral artery. The right vertebral artery is diminutive in size and may end in a PICA.  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  4.Left cochlear implant.        Electronically Signed: Rc Samano MD    12/27/2024 12:48 PM EST    Workstation ID: [id number]    CT Angiogram Neck [[id number]] Collected: 12/27/24 1234     Updated: 12/27/24 1250    Narrative:      CT ANGIOGRAM HEAD W AI ANALYSIS OF LVO, CT ANGIOGRAM NECK    Date of Exam: 12/27/2024 11:28 AM EST    Indication: Stroke, follow up  Neuro deficit, acute, stroke suspected  Acute Stroke.    Comparison: CTA head July 16, 2024    Technique: CTA of the head and neck was performed after the uneventful intravenous administration of iodinated contrast. Reconstructed coronal and sagittal images were also obtained. In addition, a 3-D volume rendered image was created for   interpretation. Automated exposure control and iterative reconstruction methods were used.      Findings:  There is hard plaque noted in the area of the carotid bifurcations/proximal internal carotid arteries. Degree of stenosis in the left internal carotid artery is around 40% by NASCET criteria and 36 % on the right by NASCET criteria. The left vertebral   artery is dominant. The right vertebral artery is diminutive in size and may end in a PICA. The basilar artery is grossly unremarkable. There does not appear to be significant abnormality of the middle cerebral arteries or posterior cerebral arteries.   There is lack of an A1 segment on the right which could relate to hypoplastic vessel.    As has been noted there is an aneurysm of the anterior communicating artery. This measures about 1.1 x 0.9 cm previously measuring about 1 x 0.83 cm. The A2 segments do not appear abnormal.    There  is streak artifact along the left cerebral hemisphere from cochlear implant.      Impression:      Impression:  1.Atherosclerotic changes are noted involving the carotid arteries.  2.Dominant left vertebral artery. The right vertebral artery is diminutive in size and may end in a PICA.  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  4.Left cochlear implant.        Electronically Signed: Rc Samano MD    12/27/2024 12:48 PM EST    Workstation ID: [id number]    CT Head Without Contrast Stroke Protocol [[id number]] Collected: 12/27/24 1120     Updated: 12/27/24 1127    Narrative:      CT HEAD WO CONTRAST STROKE PROTOCOL    Date of Exam: 12/27/2024 11:16 AM EST    Indication: Neuro deficit, acute, stroke suspected  Neuro deficit, acute, stroke suspected.    Comparison: CT head without and with contrast 1/5/2023. CT angiography 7/6/2023.    Technique: Axial CT images were obtained of the head without contrast administration.  Reconstructed coronal and sagittal images were also obtained. Automated exposure control and iterative construction methods were used.    Scan Time: 12/27/2024 at 1115  Results discussed with emergency room desk staff at 12/27/2024 at 11:25 a.m..      Findings:  Left parietal implant creates extensive streak artifact obscuring several images. The images are also degraded by motion. Extensive deep white matter hypodensities are nonspecific but favored to reflect changes of advanced chronic microvascular disease.   No acute or evolving large territory infarct is identified.    Oval 1.0 x 0.8 cm cm hyperdense focus anterior to the third ventricle corresponds to known anterior communicating artery aneurysm, demonstrated to better advantage on prior CT angiography imaging. It is not thought to be significantly changed. No gross   acute intracranial hemorrhage is identified.    No acute calvarial abnormality is seen. Paranasal sinuses and mastoid air cells are clear.  Intracranial carotid artery calcifications are present.      Impression:      Impression:    1. The study is degraded by both motion artifact and streak artifact related to left parietal implant.  2. No acute intracranial findings are seen.  3. 1 cm anterior communicating artery aneurysm appears grossly stable, corresponds to CTA head and neck findings from 7/6/2023.  4.. Advanced chronic microvascular disease.        Electronically Signed: Roxanne Jones MD    12/27/2024 11:25 AM EST    Workstation ID: [id number]    XR Chest 1 View [[id number]] Collected: 12/27/24 1058     Updated: 12/27/24 1103    Narrative:      XR CHEST 1 VW    Date of Exam: 12/27/2024 10:47 AM EST    Indication: weakness cough    Comparison: None available.    Findings:  Prior CABG is noted. No evidence of pneumothorax, pleural effusion or focal airspace disease. Calcified granuloma in the right lateral upper lobe.      Impression:      Impression:  No acute radiographic abnormality.      Electronically Signed: Dilan Partida MD    12/27/2024 11:01 AM EST    Workstation ID: [id number]              Assessment & Plan        This is a 89 y.o. male with:    Active and Resolved Problems  Active Hospital Problems    Diagnosis  POA    **Symptomatic bradycardia [R00.1]  Yes      Resolved Hospital Problems   No resolved problems to display.       Weakness  Headache  Symptomatic bradycardia  History of anterior communicating artery aneurysm  - Code stroke called in ED for acute headache with tremors, concern for aneurysm rupture  - CTA head, neck negative  - HS trop 31 -> 28  - EKG showed SB with LBBB  - HR now in 60s  - TSH 1.340; AM HgbA1c, vitamin B12, folate ordered  - Last lipid panel on 12/19/26 with HDL 35, otherwise within normal limits  - Strict NPO until swallow eval, advance diet pending results  - NIHSS Q12H  - Neuro checks Q4H  - Continuous cardiac monitor  - Neuro consulted  - Consult cardiology if condition warrants    Hypertension  -  Continue home medications as appropriate once reconciled  - Hold BB in setting of bradycardia  - Monitor BP per order  - Follows with MD Bae outpatient    Hyperlipidemia  - Continue statin once reconciled and medically appropriate  - Last lipid panel on 12/19/26 with HDL 35, otherwise within normal limits    Dementia  - Continue home meds once reconciled and medically appropriate  - Supportive care      VTE Prophylaxis:  Mechanical VTE prophylaxis orders are present.        The patient desires to be as follows:    CODE STATUS:           Eugenia Beckman, spouse, who can be contacted at [phone], is the designated person to make medical decisions on the patient's behalf if He is incapable of doing so. This was clarified with patient and/or next of kin on 12/27/2024 during the course of this H&P.    Admission Status:  I believe this patient meets inpatient status.    Expected Length of Stay: Greater than 2 midnights    PDMP and Medication Dispenses via Sidebar reviewed and consistent with patient reported medications.    I discussed the patient's findings and my recommendations with patient.      Signature:     This document has been electronically signed by PIERRE Cash on December 28, 2024 00:23 [facility] Hospitalist Team

## 2024-12-27 NOTE — CASE MANAGEMENT/SOCIAL WORK
Discharge Planning Assessment   Mauricio     Patient Name: Royal Beckman  MRN: [id number]  Today's Date: 12/27/2024    Admit Date: 12/27/2024    Plan: From Home with Spouse; Family Request [facility] referral   Discharge Needs Assessment       Row Name 12/27/24 1808       Living Environment    People in Home spouse    Name(s) of People in Home Spouse-Yamileth; Son-Hardy    Current Living Arrangements home    Potentially Unsafe Housing Conditions none    In the past 12 months has the electric, gas, oil, or water company threatened to shut off services in your home? No    Primary Care Provided by spouse/significant other;child(ruma)    Provides Primary Care For no one, unable/limited ability to care for self    Family Caregiver if Needed child(ruma), adult;spouse    Quality of Family Relationships supportive    Able to Return to Prior Arrangements yes    Living Arrangement Comments Home with Family       Resource/Environmental Concerns    Resource/Environmental Concerns none    Transportation Concerns none       Transportation Needs    In the past 12 months, has lack of transportation kept you from medical appointments or from getting medications? no    In the past 12 months, has lack of transportation kept you from meetings, work, or from getting things needed for daily living? No       Food Insecurity    Within the past 12 months, you worried that your food would run out before you got the money to buy more. Never true    Within the past 12 months, the food you bought just didn't last and you didn't have money to get more. Never true       Transition Planning    Patient/Family Anticipates Transition to home with family;home with help/services    Patient/Family Anticipated Services at Transition     Transportation Anticipated family or friend will provide       Discharge Needs Assessment    Equipment Currently Used at Home walker, rolling    Concerns to be Addressed discharge planning    Do you want help  finding or keeping work or a job? I do not need or want help    Do you want help with school or training? For example, starting or completing job training or getting a high school diploma, GED or equivalent No    Anticipated Changes Related to Illness none    Equipment Needed After Discharge none    Provided Post Acute Provider List? Yes    Post Acute Provider List Hospice    Delivered To Support Person    Support Person Lizabeth-spouse    Patient's Choice of Community Agency(s) [facility]                   Discharge Plan       Row Name 12/27/24 0156       Plan    Plan From Home with Spouse; Family Request [facility] referral    Plan Comments Met with patient and spouse, Lizabeth, at bedside. Confirmed PCP and pharm. Per spouse, pt requires assist with ADL's and IADL's. Denies financial concerns and family will provide dc transportation. Dicussed dc plan, spouse states that they have an appointment with [facility] to assess for Hospice services in January. Agreeable for CM to send referral. SC sent to Dr. Parmar/Krissy Haas NP to request Order for Hospice consult. Referral sent to [facility] in [facility]. Final dc plan is pending clinical course. DC Barriers:Neurology consult; Cardiac monitoring                                            Continued Care and Services - Admitted Since 12/27/2024       Community Resources       Service Provider Request Status Services Address Phone Fax Patient Preferred    [facility] [place] Accepted -- [address], [place] [phone] --                  Selected Continued Care - Episodes Includes continued care and service providers with selected services from the active episodes listed below      High Risk Care Management Episode start date: 11/12/2024   There are no active outsourced providers for this episode.                    Demographic Summary       Row Name 12/27/24 1922       General Information    Admission Type  inpatient    Arrived From home    Required Notices Provided Important Message from Medicare    Referral Source emergency department    Reason for Consult discharge planning    Preferred Language English       Contact Information    Permission Granted to Share Info With                    Functional Status       Row Name 12/27/24 1807       Functional Status    Usual Activity Tolerance fair    Current Activity Tolerance poor       Physical Activity    On average, how many days per week do you engage in moderate to strenuous exercise (like a brisk walk)? 0 days    On average, how many minutes do you engage in exercise at this level? 0 min    Number of minutes of exercise per week 0       Assessment of Health Literacy    How often do you have someone help you read hospital materials? Sometimes    How often do you have problems learning about your medical condition because of difficulty understanding written information? Sometimes    How often do you have a problem understanding what is told to you about your medical condition? Sometimes    How confident are you filling out medical forms by yourself? Somewhat    Health Literacy Moderate       Functional Status, IADL    Medications assistive person    Meal Preparation assistive person    Housekeeping assistive person    Laundry assistive person    Shopping assistive person    IADL Comments Pt has assist from Family       Mental Status Summary    Recent Changes in Mental Status/Cognitive Functioning mental status       Employment/    Employment Status retired              Patient Forms       Row Name 12/27/24 1807       Patient Forms    Important Message from Medicare (IMM) Delivered  IMM given to spouse    Delivered to Support person    Method of delivery In person                  Met with patient in room wearing PPE: mask  Maintained distance greater than six feet and spent less than 15 minutes in the room  Lin Govea RN    Phone  [phone]  Fax [phone]

## 2024-12-27 NOTE — THERAPY EVALUATION
Acute Care - Speech Language Pathology   Swallow Initial Evaluation  Mauricio     Patient Name: Royal Beckman  : 1935  MRN: [id number]  Today's Date: 2024               Admit Date: 2024    Visit Dx:   No diagnosis found.  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    Hyperglycemia     Hyperlipidemia     Hypertension     Knee swelling wiyhin the past several months    Low back pain 6 to 8 months ago    Low back strain     Myocardial infarction     Neck strain     Peripheral neuropathy don't remember     Past Surgical History:   Procedure Laterality Date    CARDIAC CATHETERIZATION      COLONOSCOPY      CORONARY ARTERY BYPASS GRAFT         SLP Recommendation and Plan  SLP Swallowing Diagnosis: swallow WFL/no suspected pharyngeal impairment, functional oral phase, functional pharyngeal phase (24 1400)  SLP Diet Recommendation: regular textures, thin liquids (24 1400)  Recommended Precautions and Strategies: upright posture during/after eating, small bites of food and sips of liquid, alternate between small bites of food and sips of liquid, general aspiration precautions (24 1400)  SLP Rec. for Method of Medication Administration: meds whole, meds crushed, with thin liquids, with puree, as tolerated (24 1400)     Monitor for Signs of Aspiration: yes, notify SLP if any concerns (24 1400)  Recommended Diagnostics: reassess via clinical swallow evaluation, reassess via VFSS (MBS), other (see comments) (Available for re-evaluation of swallow if  further difficulties arise) (12/27/24 1400)  Swallow Criteria for Skilled Therapeutic Interventions Met: baseline status (12/27/24 1400)  Anticipated Discharge Disposition (SLP): home with assist (12/27/24 1400)     Therapy Frequency (Swallow): evaluation only (12/27/24 1400)     Oral Care Recommendations: Oral Care BID/PRN (12/27/24 1400)          SWALLOW EVALUATION (Last 72 Hours)       SLP Adult Swallow Evaluation       Row Name 12/27/24 1400       Rehab Evaluation    Document Type evaluation  -SM    Subjective Information no complaints  -    Patient Observations alert;cooperative;agree to therapy  -    Patient/Family/Caregiver Comments/Observations Son present for session and wife present for a portion of session  -    Patient Effort excellent  -    Comment The patient was seen today for a clinical swallow evaluation due to failed swallow screen.  -       General Information    Patient Profile Reviewed yes  -SM    Pertinent History Of Current Problem Per H&P: Royal Beckman is a 89 y.o. 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 Huerta last seen 8/16/2024 and Dr. Seipel last seen 6/11/24. He has not had any growth at his last visit in August of the aneurysm.      He presents today for symptomatic bradycardia in the 40s requiring a dose of atropine and responsive to the 70s with adequate perfusion.      He was in the ED doing well when he had complained of a headache and there was concern for LUE weakness and whole body tremors. A code stroke was called given his h/o an anerysm and there was a concern for hemorrhage.      On my examination the patient denies headache, has whole body tremors/shaking but is able to antigravity all extremities equally well. He had some mild dysarthria due to his jaw jerking, otherwise his exam was unremarkable. Pupils symmetric, he is alert, following commands and has no focal  deficits. Neurointact.      CTH had sig artifact from the cochlear implant, however hemorrhage is not grossly evident, but difficult to exclude. Unfortunately he likely cannot get a MRI due to the cochlear implant. The patient states he is shaking because was very cold. Denied N/V, headache or lateralizing weakness. No visual or speech disturbance.         NIHSS 1  GCS 15  /70        - Portions of the above HPI were copied from previous encounters and edited as appropriate.     CT HEAD WO CONTRAST STROKE PROTOCOL     Date of Exam: 12/27/2024 11:16 AM EST     Indication: Neuro deficit, acute, stroke suspected  Neuro deficit, acute, stroke suspected.     Comparison: CT head without and with contrast 1/5/2023. CT angiography 7/6/2023.     Technique: Axial CT images were obtained of the head without contrast administration.  Reconstructed coronal and sagittal images were also obtained. Automated exposure control and iterative construction methods were used.     Scan Time: 12/27/2024 at 1115  Results discussed with emergency room desk staff at 12/27/2024 at 11:25 a.m..        Findings:  Left parietal implant creates extensive streak artifact obscuring several images. The images are also degraded by motion. Extensive deep white matter hypodensities are nonspecific but favored to reflect changes of advanced chronic microvascular disease.   No acute or evolving large territory infarct is identified.     Oval 1.0 x 0.8 cm cm hyperdense focus anterior to the third ventricle corresponds to known anterior communicating artery aneurysm, demonstrated to better advantage on prior CT angiography imaging. It is not thought to be significantly changed. No gross   acute intracranial hemorrhage is identified.     No acute calvarial abnormality is seen. Paranasal sinuses and mastoid air cells are clear. Intracranial carotid artery calcifications are present.     IMPRESSION:  Impression:     1. The study is degraded by both motion  "artifact and streak artifact related to left parietal implant.  2. No acute intracranial findings are seen.  3. 1 cm anterior communicating artery aneurysm appears grossly stable, corresponds to CTA head and neck findings from 7/6/2023.  4.. Advanced chronic microvascular disease.      Narrative & Impression   XR CHEST 1 VW     Date of Exam: 12/27/2024 10:47 AM EST     Indication: weakness cough     Comparison: None available.     Findings:  Prior CABG is noted. No evidence of pneumothorax, pleural effusion or focal airspace disease. Calcified granuloma in the right lateral upper lobe.     IMPRESSION:  Impression:  No acute radiographic abnormality.     -SM    Current Method of Nutrition NPO  -SM    Precautions/Limitations, Vision WFL;for purposes of eval  -SM    Precautions/Limitations, Hearing hearing impairment, bilaterally;other (see comments)  See below  -SM    Prior Level of Function-Communication WFL  -SM    Prior Level of Function-Swallowing safe, efficient swallowing in all situations;thin liquids  -SM    Plans/Goals Discussed with patient and family  -SM       Oral Motor Structure and Function    Dentition Assessment natural, present and adequate  -SM    Secretion Management WNL/WFL  -SM    Mucosal Quality moist, healthy  -SM       Oral Musculature and Cranial Nerve Assessment    Oral Motor, Comment No overt weakness or asymmetry of the oral musculature noted. No facial weakness or asymmetry appreciated. Dentition are natural and in good condition. He is verbal and presents with good intelligibility. No hoarse or \"wet\" vocal quality audible at baseline  -SM       General Eating/Swallowing Observations    Respiratory Support Currently in Use room air  -SM    Eating/Swallowing Skills self-fed;appropriate self-feeding skills observed  -SM    Positioning During Eating upright 90 degree;upright in bed  -SM    Utensils Used straw;spoon  -SM    Consistencies Trialed thin liquids;pureed;mechanical ground " "textures;regular textures  -SM       Clinical Swallow Eval    Clinical Swallow Evaluation Summary The patient was seen today for a clinical swallow evaluation due to failed swallow screen. Upon entrance to the patient's room he was laying reclined in bed. He did arouse easily and was amenable to allow head of bed to be elevated to approximately 90 degrees. He was alert, cooperative and eager for po trials. Son present and served as informant. 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 No difficulties with swallowing reported prior to admission. Per son report, the patient awoke today with complaints of a head ache and jaw pain. As he has a history of anterior communicating artery aneurysm he was told to come to the ED if he has any onset of headaches. It was reported that he had difficulty communicating earlier today and was not talking. Initial Swallow Screen was failed due to palatal asymmetry. The patient was awake, alert and cooperative at the time of this assessment. He is verbal and presents with good intelligibility. Some difficulty following commands noted, however this appeared to be due to decreased hearing acuity. The patient was positioned up and was able to self feed the following: thin liquid by straw x 3-4 trials, puree (applesauce) x 3 or more trials, mechanical soft (peaches) x 4 trials, and regular solid (peanut butter cracker) x entire cracker. Labial seal on spoon and straw are adequate. He is able to pull thin liquids via straw effectively and clears the utensil well. Functional oral transit and bolus control noted with all trials/consistencies. No overt pharyngeal signs/symptoms of difficulty audible at any time. He presents with no coughing, throat clearing, or \"choking\". Vocal quality is clear. He denies any globus sensation or odynophagia. He is eager to resume a po diet.     RECOMMENDATIONS:  It is recommended that the " patient initiate a regular consistency diet with thin liquids at this time. He should be up at a full 90 degree angle for all meals/medications. He demonstrated independence with self feeding and use of safe swallow compensations. No pharyngeal s/s of difficulties noted. ST will complete order and sign off at this time as recommended diet is baseline for this patient. We are available to be re-consulted if difficulties arise with the above diet and recommendations. Discussed with patient, family and nursing staff. All verbalized understanding and in agreement with the plan.      -       SLP Evaluation Clinical Impression    SLP Swallowing Diagnosis swallow WFL/no suspected pharyngeal impairment;functional oral phase;functional pharyngeal phase  -    Functional Impact no impact on function  -    Swallow Criteria for Skilled Therapeutic Interventions Met baseline status  -       Recommendations    Therapy Frequency (Swallow) evaluation only  -    SLP Diet Recommendation regular textures;thin liquids  -    Recommended Diagnostics reassess via clinical swallow evaluation;reassess via VFSS (MBS);other (see comments)  Available for re-evaluation of swallow if further difficulties arise  -    Recommended Precautions and Strategies upright posture during/after eating;small bites of food and sips of liquid;alternate between small bites of food and sips of liquid;general aspiration precautions  -    Oral Care Recommendations Oral Care BID/PRN  -    SLP Rec. for Method of Medication Administration meds whole;meds crushed;with thin liquids;with puree;as tolerated  -    Monitor for Signs of Aspiration yes;notify SLP if any concerns  -    Anticipated Discharge Disposition (SLP) home with assist  -              User Key  (r) = Recorded By, (t) = Taken By, (c) = Cosigned By      Initials Name Effective Dates    Loren Acosta, SLP 06/16/21 -                     EDUCATION  The patient has been educated in  the following areas:   Dysphagia (Swallowing Impairment).          Time Calculation:            ELI Loredo  12/27/2024

## 2024-12-27 NOTE — ED PROVIDER NOTES
Subjective   History of Present Illness  Weakness for a few days.  Having some low back pain.  Bradycardic to the 40s given atropine and responsive to the 70s with EMS.  Has been weak with some vomiting the last couple of days.  Patient denied abdominal pain.  Has not had his medicines for the last couple days as well.  Review of Systems  See HPI.  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    Hyperglycemia     Hyperlipidemia     Hypertension     Knee swelling wiyhin the past several months    Low back pain 6 to 8 months ago    Low back strain     Myocardial infarction     Neck strain     Peripheral neuropathy don't remember       No Known Allergies    Past Surgical History:   Procedure Laterality Date    CARDIAC CATHETERIZATION      COLONOSCOPY      CORONARY ARTERY BYPASS GRAFT         Family History   Problem Relation Age of Onset    Aneurysm Father     Heart disease Brother         he passe away several tears ago       Social History     Socioeconomic History    Marital status:    Tobacco Use    Smoking status: Former     Current packs/day: 0.00     Average packs/day: 1.5 packs/day for 5.2 years (7.8 ttl pk-yrs)     Types: Cigarettes     Start date: 10/10/1964     Quit date: 12/15/1969     Years since quittin.0     Passive exposure: Past    Smokeless tobacco: Never   Vaping Use    Vaping status: Never Used   Substance and Sexual Activity    Alcohol use: Yes     Comment: occasional, only    Drug use: No    Sexual activity: Not Currently     Partners: Female           Objective   Physical Exam  No acute distress, difficulty hearing which is reportedly patient's baseline, globally diminished strength, bradycardic rate and regular rhythm, diminished breath sounds bilaterally, elevated BMI, abdomen soft and nontender, no scleral icterus, moist oral mucosa  Procedures           ED  "Course      /59 (BP Location: Left arm, Patient Position: Lying)   Pulse 67   Temp 98.5 °F (36.9 °C) (Oral)   Resp 14   Ht 165.1 cm (65\")   Wt 82.3 kg (181 lb 7 oz)   SpO2 97%   BMI 30.19 kg/m²   Labs Reviewed   COMPREHENSIVE METABOLIC PANEL - Abnormal; Notable for the following components:       Result Value    Total Protein 5.9 (*)     Albumin 3.3 (*)     All other components within normal limits    Narrative:     GFR Categories in Chronic Kidney Disease (CKD)      GFR Category          GFR (mL/min/1.73)    Interpretation  G1                     90 or greater         Normal or high (1)  G2                      60-89                Mild decrease (1)  G3a                   45-59                Mild to moderate decrease  G3b                   30-44                Moderate to severe decrease  G4                    15-29                Severe decrease  G5                    14 or less           Kidney failure          (1)In the absence of evidence of kidney disease, neither GFR category G1 or G2 fulfill the criteria for CKD.    eGFR calculation 2021 CKD-EPI creatinine equation, which does not include race as a factor   TROPONIN - Abnormal; Notable for the following components:    HS Troponin T 28 (*)     All other components within normal limits    Narrative:     High Sensitive Troponin T Reference Range:  <14.0 ng/L- Negative Female for AMI  <22.0 ng/L- Negative Male for AMI  >=14 - Abnormal Female indicating possible myocardial injury.  >=22 - Abnormal Male indicating possible myocardial injury.   Clinicians would have to utilize clinical acumen, EKG, Troponin, and serial changes to determine if it is an Acute Myocardial Infarction or myocardial injury due to an underlying chronic condition.        CBC WITH AUTO DIFFERENTIAL - Abnormal; Notable for the following components:    RBC 4.08 (*)     MCV 99.5 (*)     MCH 33.3 (*)     All other components within normal limits   HIGH SENSITIVITIY TROPONIN T 1HR - " Abnormal; Notable for the following components:    HS Troponin T 31 (*)     All other components within normal limits    Narrative:     High Sensitive Troponin T Reference Range:  <14.0 ng/L- Negative Female for AMI  <22.0 ng/L- Negative Male for AMI  >=14 - Abnormal Female indicating possible myocardial injury.  >=22 - Abnormal Male indicating possible myocardial injury.   Clinicians would have to utilize clinical acumen, EKG, Troponin, and serial changes to determine if it is an Acute Myocardial Infarction or myocardial injury due to an underlying chronic condition.        COVID-19 AND FLU A/B, NP SWAB IN TRANSPORT MEDIA 1 HR TAT - Normal    Narrative:     Fact sheet for providers: https://www.fda.gov/media/649589/download    Fact sheet for patients: https://www.fda.gov/media/963995/download    Test performed by PCR.   TSH - Normal   T4, FREE - Normal   MAGNESIUM - Normal   BNP (IN-HOUSE) - Normal    Narrative:     This assay is used as an aid in the diagnosis of individuals suspected of having heart failure. It can be used as an aid in the diagnosis of acute decompensated heart failure (ADHF) in patients presenting with signs and symptoms of ADHF to the emergency department (ED). In addition, NT-proBNP of <300 pg/mL indicates ADHF is not likely.    Age Range Result Interpretation  NT-proBNP Concentration (pg/mL:      <50             Positive            >450                   Gray                 300-450                    Negative             <300    50-75           Positive            >900                  Gray                300-900                  Negative            <300      >75             Positive            >1800                  Gray                300-1800                  Negative            <300   PROTIME-INR - Normal   APTT - Normal   POCT GLUCOSE FINGERSTICK - Normal   BASIC METABOLIC PANEL   MAGNESIUM   PHOSPHORUS   CBC WITH AUTO DIFFERENTIAL   POCT GLUCOSE FINGERSTICK   TYPE AND SCREEN   CBC AND  DIFFERENTIAL    Narrative:     The following orders were created for panel order CBC & Differential.  Procedure                               Abnormality         Status                     ---------                               -----------         ------                     CBC Auto Differential[[id number]]        Abnormal            Final result                 Please view results for these tests on the individual orders.   EXTRA TUBES    Narrative:     The following orders were created for panel order Extra Tubes.  Procedure                               Abnormality         Status                     ---------                               -----------         ------                     Light Blue Top[[id number]]                                   Final result                 Please view results for these tests on the individual orders.   LIGHT BLUE TOP   CBC AND DIFFERENTIAL    Narrative:     The following orders were created for panel order CBC & Differential.  Procedure                               Abnormality         Status                     ---------                               -----------         ------                     CBC Auto Differential[[id number]]                                                         Please view results for these tests on the individual orders.     CT Angiogram Head w AI Analysis of LVO   Final Result   Impression:   1.Atherosclerotic changes are noted involving the carotid arteries.   2.Dominant left vertebral artery. The right vertebral artery is diminutive in size and may end in a PICA.   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   4.Left cochlear implant.            Electronically Signed: Rc Samano MD     12/27/2024 12:48 PM EST     Workstation ID: [id number]      CT Angiogram Neck   Final Result   Impression:   1.Atherosclerotic changes are noted involving the carotid arteries.   2.Dominant left vertebral artery. The right vertebral artery  is diminutive in size and may end in a PICA.   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   4.Left cochlear implant.            Electronically Signed: Rc Samano MD     12/27/2024 12:48 PM EST     Workstation ID: [id number]      CT Head Without Contrast Stroke Protocol   Final Result   Impression:      1. The study is degraded by both motion artifact and streak artifact related to left parietal implant.   2. No acute intracranial findings are seen.   3. 1 cm anterior communicating artery aneurysm appears grossly stable, corresponds to CTA head and neck findings from 7/6/2023.   4.. Advanced chronic microvascular disease.            Electronically Signed: Roxanne Jones MD     12/27/2024 11:25 AM EST     Workstation ID: [id number]      XR Chest 1 View   Final Result   Impression:   No acute radiographic abnormality.         Electronically Signed: Dilan Partida MD     12/27/2024 11:01 AM EST     Workstation ID: [id number]                                                         Medical Decision Making  EKG interpretation: 1035, rate 67, normal sinus rhythm, left bundle branch block, does not meet modified Sgarbossa criteria    EKG interpretation: 1106, rate 63, normal sinus rhythm, left bundle branch block, does not meet modified Sgarbossa criteria    EKG interpretation: 1200, rate 48, normal sinus rhythm, left bundle branch block, does not meet modified Sgarbossa criteria    My interpretation of chest x-ray is no focal infiltrate or pneumothorax.  See system for radiology interpretation.      Called to room.  Patient having some shaking and change in mental status.  Complaining of sudden onset headache.  States he is having problem using his left arm.  Has a known aneurysm.  No evidence of aneurysm rupture.  Patient quickly returned to baseline after this episode.  Patient intermittently getting quite bradycardic.  Will admit for further workup and treatment of patient's generalized  weakness and likely symptomatic bradycardia  Final diagnoses:   Symptomatic bradycardia       ED Disposition  ED Disposition       ED Disposition   Decision to Admit    Condition   --    Comment   Level of Care: Progressive Care [20]   Diagnosis: Symptomatic bradycardia [960694]   Admitting Physician: JABARI TURK [[id number]]   Certification: I Certify That Inpatient Hospital Services Are Medically Necessary For Greater Than 2 Midnights                 No follow-up provider specified.       Medication List        ASK your doctor about these medications      memantine 10 MG tablet  Commonly known as: NAMENDA  Ask about: Which instructions should I use?                 Daniel Kramer MD  12/27/24 6800

## 2024-12-27 NOTE — Clinical Note
Level of Care: Telemetry [5]   Diagnosis: Symptomatic bradycardia [[id number]]   Admitting Physician: JABARI TURK [[id number]]   Certification: I Certify That Inpatient Hospital Services Are Medically Necessary For Greater Than 2 Midnights

## 2024-12-27 NOTE — CONSULTS
Primary Care Provider: Provider, No Known     Consult requested by:  ED    Reason for Consultation: Neurological evaluation - code stroke, headache and tremors    History taken from: patient chart RN    Chief complaint: symptomatic bradycardia       SUBJECTIVE:    History of present illness: Background per H&P: Royal Beckman is a 89 y.o. 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 Huerta last seen 8/16/2024 and Dr. Seipel last seen 6/11/24. He has not had any growth at his last visit in August of the aneurysm.     He presents today for symptomatic bradycardia in the 40s requiring a dose of atropine and responsive to the 70s with adequate perfusion.     He was in the ED doing well when he had complained of a headache and there was concern for LUE weakness and whole body tremors. A code stroke was called given his h/o an anerysm and there was a concern for hemorrhage.     On my examination the patient denies headache, has whole body tremors/shaking but is able to antigravity all extremities equally well. He had some mild dysarthria due to his jaw jerking, otherwise his exam was unremarkable. Pupils symmetric, he is alert, following commands and has no focal deficits. Neurointact.     CTH had sig artifact from the cochlear implant, however hemorrhage is not grossly evident, but difficult to exclude. Unfortunately he likely cannot get a MRI due to the cochlear implant. The patient states he is shaking because was very cold. Denied N/V, headache or lateralizing weakness. No visual or speech disturbance.       NIHSS 1  GCS 15  /70      - Portions of the above HPI were copied from previous encounters and edited as appropriate. PMH as detailed below.     Review of Systems   Constitutional:  Negative for fever and unexpected weight change.   HENT:  Positive for hearing loss. Negative for ear pain, tinnitus, trouble  swallowing and voice change.    Eyes:  Negative for photophobia and visual disturbance.   Respiratory:  Negative for chest tightness and shortness of breath.    Cardiovascular:  Negative for chest pain and palpitations.   Gastrointestinal:  Negative for nausea and vomiting.   Genitourinary:  Negative for difficulty urinating, dysuria, frequency and urgency.   Musculoskeletal:  Negative for back pain, gait problem, neck pain and neck stiffness.   Neurological:  Positive for tremors. Negative for dizziness, seizures, syncope, facial asymmetry, speech difficulty, weakness, light-headedness, numbness and headaches.   Psychiatric/Behavioral:  Negative for agitation, behavioral problems and confusion.    All other systems reviewed and are negative.         PATIENT HISTORY:  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    Hyperglycemia     Hyperlipidemia     Hypertension     Knee swelling wiyhin the past several months    Low back pain 6 to 8 months ago    Low back strain     Myocardial infarction     Neck strain     Peripheral neuropathy don't remember   ,   Past Surgical History:   Procedure Laterality Date    CARDIAC CATHETERIZATION      COLONOSCOPY      CORONARY ARTERY BYPASS GRAFT     ,   Family History   Problem Relation Age of Onset    Aneurysm Father     Heart disease Brother         he passe away several tears ago   ,   Social History     Tobacco Use    Smoking status: Former     Current packs/day: 0.00     Average packs/day: 1.5 packs/day for 5.2 years (7.8 ttl pk-yrs)     Types: Cigarettes     Start date: 10/10/1964     Quit date: 12/15/1969     Years since quittin.0     Passive exposure: Past    Smokeless tobacco: Never   Vaping Use    Vaping status: Never Used   Substance Use Topics    Alcohol use: Yes     Comment: occasional, only    Drug use: No   ,   Prior to Admission medications     Medication Sig Start Date End Date Taking? Authorizing Provider   amLODIPine (NORVASC) 10 MG tablet TAKE ONE TABLET BY MOUTH EVERY DAY 7/8/24   Sanjiv Bae MD   aspirin 81 MG tablet ASPIRIN 81 MG ORAL TABLET 8/6/14   Shalom Mcgarry MD   atenolol (TENORMIN) 25 MG tablet TAKE 1/2 TABLET BY MOUTH EVERY DAY 5/28/24   Sanjiv Bae MD   atorvastatin (LIPITOR) 20 MG tablet Take 1 tablet by mouth Daily. 4/8/24   Sanjiv Bae MD   Cyanocobalamin (B-12) 1000 MCG capsule B-12 1000 MCG CAPS 8/7/15   Shalom Mcgarry MD   Diclofenac Sodium (VOLTAREN) 1 % gel gel Apply 4 g topically to the appropriate area as directed 3 (Three) Times a Day As Needed (pain). 8/16/23   Edin Mckeon APRN   donepezil (Aricept) 10 MG tablet Take 1 tablet by mouth 2 (Two) Times a Day. 6/11/24   Seipel, Joseph F, MD   memantine (NAMENDA) 10 MG tablet One daily for one month then one bid 6/11/24   Seipel, Joseph F, MD   niacin 500 MG tablet NIACIN 500 MG TABS 8/6/14   Shalom Mcgarry MD   Omega-3 Fatty Acids ([employer] FISH OIL) 1200 MG capsule [employer] FISH OIL 1200 MG CAPS 8/6/14   Shalom Mcagrry MD   sertraline (Zoloft) 50 MG tablet Take 1 tablet by mouth Daily. 6/11/24 6/11/25  Seipel, Joseph F, MD   vitamin D3 125 MCG (5000 UT) capsule capsule Take 1 capsule by mouth Daily.    Shalom Mcgarry MD    Allergies:  Patient has no known allergies.    Current Facility-Administered Medications   Medication Dose Route Frequency Provider Last Rate Last Admin    sodium chloride 0.9 % flush 10 mL  10 mL Intravenous PRN Daniel Kramer MD        sodium chloride 0.9 % flush 10 mL  10 mL Intravenous PRN Daniel Kramer MD         Current Outpatient Medications   Medication Sig Dispense Refill    amLODIPine (NORVASC) 10 MG tablet TAKE ONE TABLET BY MOUTH EVERY DAY 90 tablet 2    aspirin 81 MG tablet ASPIRIN 81 MG ORAL TABLET      atenolol (TENORMIN) 25 MG tablet TAKE 1/2 TABLET BY MOUTH EVERY  DAY 45 tablet 3    atorvastatin (LIPITOR) 20 MG tablet Take 1 tablet by mouth Daily. 90 tablet 3    Cyanocobalamin (B-12) 1000 MCG capsule B-12 1000 MCG CAPS      Diclofenac Sodium (VOLTAREN) 1 % gel gel Apply 4 g topically to the appropriate area as directed 3 (Three) Times a Day As Needed (pain). 100 g 0    donepezil (Aricept) 10 MG tablet Take 1 tablet by mouth 2 (Two) Times a Day. 180 tablet 3    memantine (NAMENDA) 10 MG tablet One daily for one month then one bid 60 tablet 11    niacin 500 MG tablet NIACIN 500 MG TABS      Omega-3 Fatty Acids ([employer] FISH OIL) 1200 MG capsule [employer] FISH OIL 1200 MG CAPS      sertraline (Zoloft) 50 MG tablet Take 1 tablet by mouth Daily. 30 tablet 11    vitamin D3 125 MCG (5000 UT) capsule capsule Take 1 capsule by mouth Daily.          ________________________________________________________        OBJECTIVE:  Upon today's exam      GEN: NAD, pleasant, cooperative  CHEST: No signs of resp distress, on room air    NEURO  MENTAL STATUS: AAOx3, memory intact, fund of knowledge appropriate  LANG/SPEECH: Naming and repetition intact, fluent, follows 3-step commands  CRANIAL NERVES:  II: Pupils equal and reactive, no RAPD, no VF deficits, normal fundus  III, IV, VI: EOM intact, no gaze preference or deviation, no nystagmus.  V: normal sensation in V1, V2, and V3 segments bilaterally  VII: no asymmetry, no nasolabial fold flattening  VIII: normal hearing to speech  IX, X: normal palatal elevation, no uvular deviation  XI: 5/5 head turn and 5/5 shoulder shrug bilaterally  XII: midline tongue protrusion    MOTOR:  3/5 motor throughout, but with effort he is able to resist to a 4/5.     REFLEXES: 2/4 throughout    SENSORY:  Normal to touch  No hemineglect, no extinction to double-sided stimulation (visual & tactile)  COORD: Normal finger to nose           NIH Stroke Scale  Interval: baseline  1a. Level of Consciousness: 0-->Alert, keenly responsive  1b. LOC Questions: 0-->Answers both  questions correctly  1c. LOC Commands: 0-->Performs both tasks correctly  2. Best Gaze: 0-->Normal  3. Visual: 0-->No visual loss  4. Facial Palsy: 0-->Normal symmetrical movements  5a. Motor Arm, Left: 0-->No drift, limb holds 90 (or 45) degrees for full 10 secs  5b. Motor Arm, Right: 0-->No drift, limb holds 90 (or 45) degrees for full 10 secs  6a. Motor Leg, Left: 0-->No drift, leg holds 30 degree position for full 5 secs  6b. Motor Leg, Right: 0-->No drift, leg holds 30 degree position for full 5 secs  7. Limb Ataxia: 0-->Absent  8. Sensory: 0-->Normal, no sensory loss  9. Best Language: 0-->No aphasia, normal  10. Dysarthria: 1-->Mild-to-moderate dysarthria, patient slurs at least some words and, at worst, can be understood with some difficulty  11. Extinction and Inattention (formerly Neglect): 0-->No abnormality  Total (NIH Stroke Scale): 1         ________________________________________________________   RESULTS REVIEW:    VITAL SIGNS:   Temp:  [97.7 °F (36.5 °C)] 97.7 °F (36.5 °C)  Heart Rate:  [63-72] 70  Resp:  [18-30] 30  BP: (112-125)/(66-71) 125/70     LABS:      Lab 12/27/24  1042   WBC 5.39   HEMOGLOBIN 13.6   HEMATOCRIT 40.6   PLATELETS 163   NEUTROS ABS 3.30   IMMATURE GRANS (ABS) 0.01   LYMPHS ABS 1.44   MONOS ABS 0.49   EOS ABS 0.12   MCV 99.5*         Lab 12/27/24  1042   SODIUM 142   POTASSIUM 3.9   CHLORIDE 106   CO2 26.7   ANION GAP 9.3   BUN 14   CREATININE 0.81   EGFR 85.3   GLUCOSE 83   CALCIUM 9.2   MAGNESIUM 2.0   TSH 1.340         Lab 12/27/24  1042   TOTAL PROTEIN 5.9*   ALBUMIN 3.3*   GLOBULIN 2.6   ALT (SGPT) 8   AST (SGOT) 21   BILIRUBIN 0.8   ALK PHOS 115         Lab 12/27/24  1042   PROBNP 383.0   HSTROP T 28*                     Lab Results   Component Value Date    TSH 1.340 12/27/2024    LDL 71 12/19/2024    HGBA1C 5.2 12/01/2022    [id number] 1,143 (H) 08/01/2024       IMAGING STUDIES:  XR Chest 1 View    Result Date: 12/27/2024  Impression: No acute radiographic abnormality.  Electronically Signed: Dilan Partida MD  12/27/2024 11:01 AM EST  Workstation ID: [id number]     I reviewed the patient's new clinical results.    ________________________________________________________     PROBLEM LIST:    * No active hospital problems. *            ASSESSMENT/PLAN:    Symptomatic bradycardia   H/o anterior communicating artery aneurysm followed with an IR team, Dr. Huerta.  Code stroke called for concern of aneurysmal rupture due to headache.   H/o dementia  Patient symptoms seem inconsistent, he initially told ED staff he had a headache and weakness on his left side with tremors, however on my examination I did not notice any lateralizing deficits and he did not complain of a headache.  Patient does have a history of dementia and this is a possible behavioral disturbance, however given the negative imaging and his history of aneurysm with left-sided symptoms we will still obtain an MRI brain if able to rule out an acute ischemic event.    Imaging reviewed  NCCT: stable 1 cm ACOM aneurysm compared to scan from 7/6/23. Streak and motion artifact, bu no acute intracranial findings.   CTA H/N: Negative for LVO, flow-limiting stenosis.  Stable appearing ACOM aneurysm measuring 1 x 0.83 cm previously it was measured at 1.1 x 0.9 cm approximately the same.  MRI Brain WO if able  Baseline EKG and chest x-ray  Echocardiogram with bubble study  Continuous cardiac telemetry to monitor for arrhythmia  Continue with home dose aspirin 81 mg daily   Stroke labwork: HgbA1C, lipid panel, urine drug screen  Acetaminophen 640 mg PO every 4 hours for temperature >99F  High intensity statin therapy for LDL >70 (to be started prior to discharge)  Tight glucose control (long-term goal HgbA1c < 6%)  Physical/Occupational/Speech therapy: Evaluate and treat  Vital signs per hospital protocol  Neuroassessment per hospital protocol  Please document NIHSS on admission and with any neurochanges  Strict NPO until bedside  swallow study, advance diet as appropriate  Oxygen therapy (titrate to keep SpO2 greater than 94%  Activity as tolerated  Stroke education and counseling  Recommend outpatient follow-up with Dr. Huerta (vascular neuro) and Dr. Seipel (neuro)    If MRI brain is negative for an acute ischemic stroke, I will sign off and would be happy to re-evaluate should any new neurological issues arise or should further input be deemed helpful.      Dementia  C/w home dose Aricept  C/w home dose memantine      Modification of stroke risk factors:   - Blood pressure should be less than 130/80 outpatient, HbA1c less than 6.5, LDL less than 70; b12>500 and smoking cessation if applicable. We would be grateful if the primary team / primary care physician would keep a close watch on the above targets.  - Stroke education  - Follow up with neurologist of choice      I discussed the patient's findings and my recommendations with patient, nursing staff, and primary care team    Leonard Marlow MD  12/27/24  11:24 EST

## 2024-12-27 NOTE — PLAN OF CARE
"Goal Outcome Evaluation:   The patient was seen today for a clinical swallow evaluation due to failed swallow screen. Upon entrance to the patient's room he was laying reclined in bed. He did arouse easily and was amenable to allow head of bed to be elevated to approximately 90 degrees. He was alert, cooperative and eager for po trials. Son present and served as informant. 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 No difficulties with swallowing reported prior to admission. Per son report, the patient awoke today with complaints of a head ache and jaw pain. As he has a history of anterior communicating artery aneurysm he was told to come to the ED if he has any onset of headaches. It was reported that he had difficulty communicating earlier today and was not talking. Initial Swallow Screen was failed due to palatal asymmetry. The patient was awake, alert and cooperative at the time of this assessment. He is verbal and presents with good intelligibility. Some difficulty following commands noted, however this appeared to be due to decreased hearing acuity. The patient was positioned up and was able to self feed the following: thin liquid by straw x 3-4 trials, puree (applesauce) x 3 or more trials, mechanical soft (peaches) x 4 trials, and regular solid (peanut butter cracker) x entire cracker. Labial seal on spoon and straw are adequate. He is able to pull thin liquids via straw effectively and clears the utensil well. Functional oral transit and bolus control noted with all trials/consistencies. No overt pharyngeal signs/symptoms of difficulty audible at any time. He presents with no coughing, throat clearing, or \"choking\". Vocal quality is clear. He denies any globus sensation or odynophagia. He is eager to resume a po diet.     RECOMMENDATIONS:  It is recommended that the patient initiate a regular consistency diet with thin liquids at this " time. He should be up at a full 90 degree angle for all meals/medications. He demonstrated independence with self feeding and use of safe swallow compensations. No pharyngeal s/s of difficulties noted. ST will complete order and sign off at this time as recommended diet is baseline for this patient. We are available to be re-consulted if difficulties arise with the above diet and recommendations. Discussed with patient, family and nursing staff. All verbalized understanding and in agreement with the plan.                 Anticipated Discharge Disposition (SLP): home with assist          SLP Swallowing Diagnosis: swallow WFL/no suspected pharyngeal impairment, functional oral phase, functional pharyngeal phase (12/27/24 1400)

## 2024-12-28 ENCOUNTER — APPOINTMENT (OUTPATIENT)
Dept: CARDIOLOGY | Facility: HOSPITAL | Age: 89
End: 2024-12-28
Payer: MEDICARE

## 2024-12-28 ENCOUNTER — APPOINTMENT (OUTPATIENT)
Dept: CT IMAGING | Facility: HOSPITAL | Age: 89
End: 2024-12-28
Payer: MEDICARE

## 2024-12-28 LAB
ANION GAP SERPL CALCULATED.3IONS-SCNC: 9.6 MMOL/L (ref 5–15)
AORTIC DIMENSIONLESS INDEX: 0.51 (DI)
AV MEAN PRESS GRAD SYS DOP V1V2: 6 MMHG
AV VMAX SYS DOP: 178 CM/SEC
BASOPHILS # BLD AUTO: 0.03 10*3/MM3 (ref 0–0.2)
BASOPHILS NFR BLD AUTO: 0.4 % (ref 0–1.5)
BH CV ECHO MEAS - ACS: 1.6 CM
BH CV ECHO MEAS - AO MAX PG: 12.7 MMHG
BH CV ECHO MEAS - AO V2 VTI: 48.3 CM
BH CV ECHO MEAS - AVA(I,D): 1.86 CM2
BH CV ECHO MEAS - EDV(CUBED): 46.7 ML
BH CV ECHO MEAS - EDV(MOD-SP4): 56.3 ML
BH CV ECHO MEAS - EF(MOD-SP4): 57 %
BH CV ECHO MEAS - ESV(CUBED): 19.7 ML
BH CV ECHO MEAS - ESV(MOD-SP4): 24.2 ML
BH CV ECHO MEAS - FS: 25 %
BH CV ECHO MEAS - IVS/LVPW: 1.08 CM
BH CV ECHO MEAS - IVSD: 1.3 CM
BH CV ECHO MEAS - LA DIMENSION: 4.5 CM
BH CV ECHO MEAS - LAT PEAK E' VEL: 8.2 CM/SEC
BH CV ECHO MEAS - LV MASS(C)D: 150.6 GRAMS
BH CV ECHO MEAS - LV MAX PG: 3.3 MMHG
BH CV ECHO MEAS - LV MEAN PG: 2 MMHG
BH CV ECHO MEAS - LV V1 MAX: 90.2 CM/SEC
BH CV ECHO MEAS - LV V1 VTI: 25.9 CM
BH CV ECHO MEAS - LVIDD: 3.6 CM
BH CV ECHO MEAS - LVIDS: 2.7 CM
BH CV ECHO MEAS - LVOT AREA: 3.5 CM2
BH CV ECHO MEAS - LVOT DIAM: 2.1 CM
BH CV ECHO MEAS - LVPWD: 1.2 CM
BH CV ECHO MEAS - MED PEAK E' VEL: 6.7 CM/SEC
BH CV ECHO MEAS - PA V2 MAX: 113 CM/SEC
BH CV ECHO MEAS - PI END-D VEL: 85.9 CM/SEC
BH CV ECHO MEAS - RV MAX PG: 3.1 MMHG
BH CV ECHO MEAS - RV V1 MAX: 88.6 CM/SEC
BH CV ECHO MEAS - RV V1 VTI: 21 CM
BH CV ECHO MEAS - RVDD: 3.2 CM
BH CV ECHO MEAS - SV(LVOT): 89.7 ML
BH CV ECHO MEAS - SV(MOD-SP4): 32.1 ML
BH CV ECHO MEAS - TAPSE (>1.6): 2.28 CM
BH CV ECHO MEAS - TR MAX PG: 20.3 MMHG
BH CV ECHO MEAS - TR MAX VEL: 225 CM/SEC
BUN SERPL-MCNC: 16 MG/DL (ref 8–23)
BUN/CREAT SERPL: 18.6 (ref 7–25)
CALCIUM SPEC-SCNC: 9.1 MG/DL (ref 8.6–10.5)
CHLORIDE SERPL-SCNC: 107 MMOL/L (ref 98–107)
CO2 SERPL-SCNC: 25.4 MMOL/L (ref 22–29)
CREAT SERPL-MCNC: 0.86 MG/DL (ref 0.76–1.27)
DEPRECATED RDW RBC AUTO: 51.7 FL (ref 37–54)
EGFRCR SERPLBLD CKD-EPI 2021: 83.8 ML/MIN/1.73
EOSINOPHIL # BLD AUTO: 0.14 10*3/MM3 (ref 0–0.4)
EOSINOPHIL NFR BLD AUTO: 1.7 % (ref 0.3–6.2)
ERYTHROCYTE [DISTWIDTH] IN BLOOD BY AUTOMATED COUNT: 14.3 % (ref 12.3–15.4)
FOLATE SERPL-MCNC: 3.96 NG/ML (ref 4.78–24.2)
GLUCOSE BLDC GLUCOMTR-MCNC: 155 MG/DL (ref 70–105)
GLUCOSE SERPL-MCNC: 89 MG/DL (ref 65–99)
HBA1C MFR BLD: 5.28 % (ref 4.8–5.6)
HCT VFR BLD AUTO: 41.4 % (ref 37.5–51)
HGB BLD-MCNC: 13.7 G/DL (ref 13–17.7)
IMM GRANULOCYTES # BLD AUTO: 0.02 10*3/MM3 (ref 0–0.05)
IMM GRANULOCYTES NFR BLD AUTO: 0.2 % (ref 0–0.5)
LYMPHOCYTES # BLD AUTO: 1.82 10*3/MM3 (ref 0.7–3.1)
LYMPHOCYTES NFR BLD AUTO: 22.5 % (ref 19.6–45.3)
MAGNESIUM SERPL-MCNC: 2.1 MG/DL (ref 1.6–2.4)
MCH RBC QN AUTO: 32.9 PG (ref 26.6–33)
MCHC RBC AUTO-ENTMCNC: 33.1 G/DL (ref 31.5–35.7)
MCV RBC AUTO: 99.3 FL (ref 79–97)
MONOCYTES # BLD AUTO: 0.7 10*3/MM3 (ref 0.1–0.9)
MONOCYTES NFR BLD AUTO: 8.6 % (ref 5–12)
NEUTROPHILS NFR BLD AUTO: 5.39 10*3/MM3 (ref 1.7–7)
NEUTROPHILS NFR BLD AUTO: 66.6 % (ref 42.7–76)
NRBC BLD AUTO-RTO: 0 /100 WBC (ref 0–0.2)
PHOSPHATE SERPL-MCNC: 3.3 MG/DL (ref 2.5–4.5)
PLATELET # BLD AUTO: 157 10*3/MM3 (ref 140–450)
PMV BLD AUTO: 11.3 FL (ref 6–12)
POTASSIUM SERPL-SCNC: 3.5 MMOL/L (ref 3.5–5.2)
QT INTERVAL: 448 MS
QT INTERVAL: 464 MS
QT INTERVAL: 510 MS
QTC INTERVAL: 457 MS
QTC INTERVAL: 472 MS
QTC INTERVAL: 477 MS
RBC # BLD AUTO: 4.17 10*6/MM3 (ref 4.14–5.8)
SINUS: 3.8 CM
SODIUM SERPL-SCNC: 142 MMOL/L (ref 136–145)
VIT B12 BLD-MCNC: 1560 PG/ML (ref 211–946)
WBC NRBC COR # BLD AUTO: 8.1 10*3/MM3 (ref 3.4–10.8)

## 2024-12-28 PROCEDURE — 70450 CT HEAD/BRAIN W/O DYE: CPT

## 2024-12-28 PROCEDURE — 82948 REAGENT STRIP/BLOOD GLUCOSE: CPT

## 2024-12-28 PROCEDURE — 84100 ASSAY OF PHOSPHORUS: CPT

## 2024-12-28 PROCEDURE — 85025 COMPLETE CBC W/AUTO DIFF WBC: CPT

## 2024-12-28 PROCEDURE — 82607 VITAMIN B-12: CPT

## 2024-12-28 PROCEDURE — 93306 TTE W/DOPPLER COMPLETE: CPT | Performed by: INTERNAL MEDICINE

## 2024-12-28 PROCEDURE — 83735 ASSAY OF MAGNESIUM: CPT

## 2024-12-28 PROCEDURE — 80048 BASIC METABOLIC PNL TOTAL CA: CPT

## 2024-12-28 PROCEDURE — 93010 ELECTROCARDIOGRAM REPORT: CPT | Performed by: INTERNAL MEDICINE

## 2024-12-28 PROCEDURE — 70496 CT ANGIOGRAPHY HEAD: CPT

## 2024-12-28 PROCEDURE — 99233 SBSQ HOSP IP/OBS HIGH 50: CPT | Performed by: STUDENT IN AN ORGANIZED HEALTH CARE EDUCATION/TRAINING PROGRAM

## 2024-12-28 PROCEDURE — 83036 HEMOGLOBIN GLYCOSYLATED A1C: CPT

## 2024-12-28 PROCEDURE — 25010000002 LEVETRIRACETAM PER 10 MG: Performed by: STUDENT IN AN ORGANIZED HEALTH CARE EDUCATION/TRAINING PROGRAM

## 2024-12-28 PROCEDURE — 82746 ASSAY OF FOLIC ACID SERUM: CPT

## 2024-12-28 PROCEDURE — 25510000001 IOPAMIDOL PER 1 ML: Performed by: INTERNAL MEDICINE

## 2024-12-28 PROCEDURE — 93306 TTE W/DOPPLER COMPLETE: CPT

## 2024-12-28 PROCEDURE — 93005 ELECTROCARDIOGRAM TRACING: CPT | Performed by: INTERNAL MEDICINE

## 2024-12-28 PROCEDURE — 99222 1ST HOSP IP/OBS MODERATE 55: CPT | Performed by: INTERNAL MEDICINE

## 2024-12-28 RX ORDER — AMLODIPINE BESYLATE 5 MG/1
5 TABLET ORAL DAILY
Status: DISCONTINUED | OUTPATIENT
Start: 2024-12-28 | End: 2024-12-30

## 2024-12-28 RX ORDER — POTASSIUM CHLORIDE 1500 MG/1
40 TABLET, EXTENDED RELEASE ORAL EVERY 4 HOURS
Status: COMPLETED | OUTPATIENT
Start: 2024-12-28 | End: 2024-12-28

## 2024-12-28 RX ORDER — MULTIVITAMIN WITH IRON
1000 TABLET ORAL DAILY
Status: DISCONTINUED | OUTPATIENT
Start: 2024-12-28 | End: 2024-12-31 | Stop reason: HOSPADM

## 2024-12-28 RX ORDER — ASPIRIN 81 MG/1
81 TABLET ORAL DAILY
Status: DISCONTINUED | OUTPATIENT
Start: 2024-12-28 | End: 2024-12-31 | Stop reason: HOSPADM

## 2024-12-28 RX ORDER — DONEPEZIL HYDROCHLORIDE 5 MG/1
10 TABLET, FILM COATED ORAL 2 TIMES DAILY
Status: DISCONTINUED | OUTPATIENT
Start: 2024-12-28 | End: 2024-12-31 | Stop reason: HOSPADM

## 2024-12-28 RX ORDER — MEMANTINE HYDROCHLORIDE 10 MG/1
10 TABLET ORAL EVERY 12 HOURS SCHEDULED
Status: DISCONTINUED | OUTPATIENT
Start: 2024-12-28 | End: 2024-12-31 | Stop reason: HOSPADM

## 2024-12-28 RX ORDER — ATORVASTATIN CALCIUM 20 MG/1
20 TABLET, FILM COATED ORAL DAILY
Status: DISCONTINUED | OUTPATIENT
Start: 2024-12-28 | End: 2024-12-31 | Stop reason: HOSPADM

## 2024-12-28 RX ORDER — AMLODIPINE BESYLATE 5 MG/1
10 TABLET ORAL DAILY
Status: DISCONTINUED | OUTPATIENT
Start: 2024-12-28 | End: 2024-12-28

## 2024-12-28 RX ORDER — IOPAMIDOL 755 MG/ML
100 INJECTION, SOLUTION INTRAVASCULAR
Status: COMPLETED | OUTPATIENT
Start: 2024-12-28 | End: 2024-12-28

## 2024-12-28 RX ORDER — LEVETIRACETAM 500 MG/5ML
1500 INJECTION, SOLUTION, CONCENTRATE INTRAVENOUS ONCE
Status: COMPLETED | OUTPATIENT
Start: 2024-12-28 | End: 2024-12-28

## 2024-12-28 RX ADMIN — POTASSIUM CHLORIDE 40 MEQ: 1500 TABLET, EXTENDED RELEASE ORAL at 13:41

## 2024-12-28 RX ADMIN — Medication 1000 MCG: at 09:30

## 2024-12-28 RX ADMIN — Medication 10 ML: at 20:23

## 2024-12-28 RX ADMIN — SERTRALINE HYDROCHLORIDE 50 MG: 50 TABLET, FILM COATED ORAL at 09:30

## 2024-12-28 RX ADMIN — IOPAMIDOL 100 ML: 755 INJECTION, SOLUTION INTRAVENOUS at 17:08

## 2024-12-28 RX ADMIN — DONEPEZIL HYDROCHLORIDE 10 MG: 5 TABLET, FILM COATED ORAL at 20:23

## 2024-12-28 RX ADMIN — AMLODIPINE BESYLATE 5 MG: 5 TABLET ORAL at 09:30

## 2024-12-28 RX ADMIN — MEMANTINE 10 MG: 10 TABLET ORAL at 20:24

## 2024-12-28 RX ADMIN — MEMANTINE 10 MG: 10 TABLET ORAL at 10:19

## 2024-12-28 RX ADMIN — DONEPEZIL HYDROCHLORIDE 10 MG: 5 TABLET, FILM COATED ORAL at 09:30

## 2024-12-28 RX ADMIN — ACETAMINOPHEN 650 MG: 325 TABLET, FILM COATED ORAL at 18:09

## 2024-12-28 RX ADMIN — Medication 5000 UNITS: at 09:30

## 2024-12-28 RX ADMIN — LEVETIRACETAM 1500 MG: 100 INJECTION, SOLUTION INTRAVENOUS at 15:56

## 2024-12-28 RX ADMIN — POTASSIUM CHLORIDE 40 MEQ: 1500 TABLET, EXTENDED RELEASE ORAL at 10:25

## 2024-12-28 RX ADMIN — ASPIRIN 81 MG: 81 TABLET, COATED ORAL at 09:30

## 2024-12-28 RX ADMIN — ATORVASTATIN CALCIUM 20 MG: 20 TABLET, FILM COATED ORAL at 09:30

## 2024-12-28 RX ADMIN — Medication 10 ML: at 09:32

## 2024-12-28 NOTE — CASE MANAGEMENT/SOCIAL WORK
Continued Stay Note  DIOR Martínez     Patient Name: Royal Beckman  MRN: [id number]  Today's Date: 12/28/2024    Admit Date: 12/27/2024     Discharge Plan       Row Name 12/28/24 1253       Plan    Plan Comments CM notified by Dalia liaison with [facility] that patient will be evaluated once he discharges and returns home.           Aminah Zhou RN     Office: [phone]  Fax: [phone]

## 2024-12-28 NOTE — CONSULTS
[facility]        Subjective:       Cardiology assessment and plan    Bradycardia  Sinus bradycardia  Fatigue  Dizziness  History of falls at home  Coronary artery disease prior coronary bypass surgery  Hypertension  Hyperlipidemia  Anemia  Thrombocytopenia  Unable to get much history from the patient  Patient has some confusion and also underlying dementia  Patient noted to be in bradycardia with sinus bradycardia complaining of being fatigued and weakness and dizzy at home  Atenolol was discontinued  Sinus bradycardia with a heart rate in 40s  History of dementia  Patient was seen and evaluated by neurology today  Tmax is 98 pulse is 42-51 respirations are 16 blood pressure is 129/58 sats are 97%  Twelve-lead EKG shows sinus bradycardia with left bundle branch block  Sodium is 142 potassium is 3.5 creatinine is 0.86 hemoglobin is 13.7  CT head with no acute intracranial findings  Echocardiogram with LV ejection fraction of 45 to 50%  Mild aortic stenosis  Avoid AV hoda blocking medications  Current medications include aspirin 81 mg p.o. once a day Norvasc 5 mg p.o. once a day Lipitor 20 mg p.o. once a day  Continue close monitoring and follow-up  Further recommendation based on patient course            Encounter Date:12/27/2024      Patient ID: Royal Beckman is a 89 y.o. male.    Chief Complaint: fatigue  Cardiology Consult: bradycardia    Referring Physician: Krissy Haas APRN     HPI: Patient is very [place]  Royal Beckman is a 89 y.o. male who presents with fatigue and noted to be bradycardic.  Mr. Beckman is a patient of Dr. Bae. Pmh includes CAD,NSTEMI, CABG 2001 with LIMA to LAD, SVG to D1, SVG to OM, SVG to PDA, Bradycardia, dyslipidemia, hyperglycemia, anemia, thrombocytopenia, former smoker.    Mr. Beckman presented to ER with complaints of weakness over the last several days. He has not felt well according to documentation. Patient is very [place] and on my encounter he does not recall  why he is here. He was noted to be bradycardic with hear rate in the 40s and was given atropine by EMS.    Work up in ER revealed headache, patient had some tremors which prompted a CODE Stroke with acute work up CTA head neck showing stable aneurysm, neurology consulted. HS troponin 31, 28, TSH 1.3, T4 1.04, PT 14.0, INR 1.08, MCV 99.5, MCH 33.3. Covid/Flu PCR negative, CXR negative. EKG shows SB with LBBB. At home patient is on atenolol 12.5mg daily.         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    Hyperglycemia     Hyperlipidemia     Hypertension     Knee swelling wiyhin the past several months    Low back pain 6 to 8 months ago    Low back strain     Myocardial infarction     Neck strain     Peripheral neuropathy don't remember       Past Surgical History:   Procedure Laterality Date    CARDIAC CATHETERIZATION      COLONOSCOPY      CORONARY ARTERY BYPASS GRAFT         Family History   Problem Relation Age of Onset    Aneurysm Father     Heart disease Brother         he passe away several tears ago       Social History     Socioeconomic History    Marital status:    Tobacco Use    Smoking status: Former     Current packs/day: 0.00     Average packs/day: 1.5 packs/day for 5.2 years (7.8 ttl pk-yrs)     Types: Cigarettes     Start date: 10/10/1964     Quit date: 12/15/1969     Years since quittin.0     Passive exposure: Past    Smokeless tobacco: Never   Vaping Use    Vaping status: Never Used   Substance and Sexual Activity    Alcohol use: Yes     Comment: occasional, only    Drug use: No    Sexual activity: Not Currently     Partners: Female         No Known Allergies    Current Medications:   Scheduled Meds:amLODIPine, 5 mg, Oral, Daily  aspirin, 81 mg, Oral, Daily  atorvastatin, 20 mg, Oral, Daily  donepezil, 10 mg, Oral, BID  memantine, 10 mg, Oral, Q12H  potassium  "chloride ER, 40 mEq, Oral, Q4H  sertraline, 50 mg, Oral, Daily  sodium chloride, 10 mL, Intravenous, Q12H  vitamin B-12, 1,000 mcg, Oral, Daily  vitamin D3, 5,000 Units, Oral, Daily      Continuous Infusions:     Review of Systems   Unable to perform ROS: Dementia   Constitutional: Positive for malaise/fatigue.            Objective:         /48   Pulse (!) 42   Temp 97.6 °F (36.4 °C) (Temporal)   Resp 12   Ht 165.1 cm (65\")   Wt 82.1 kg (181 lb)   SpO2 95%   BMI 30.12 kg/m²     Physical Exam:  General Appearance:    Alert, cooperative, in no acute distress, very [place]                                Head: Atraumatic, normocephalic, PERRLA               Neck:   supple,  no JVD   Lungs:     Clear to auscultation,respirations regular, even and               unlabored    Heart:    Regular rhythm and bradycardic rate, normal S1 and S2 murmur   Abdomen:     Normal bowel sounds, no masses, no organomegaly, soft  nontender, nondistended, no guarding, no rebound  tenderness   Extremities:   Moves all extremities well, no edema, no cyanosis, no  redness   Pulses:   Pulses palpable and equal bilaterally   Skin:   No bleeding, bruising or rash   Neurologic:   Awake, alert, oriented x3                 ASCVD Risk Score::  The ASCVD Risk score ([place] DK, et al., 2019) failed to calculate for the following reasons:    The 2019 ASCVD risk score is only valid for ages 40 to 79      Lab Review:     Results from last 7 days   Lab Units 12/28/24  0338 12/27/24  1042   SODIUM mmol/L 142 142   POTASSIUM mmol/L 3.5 3.9   CHLORIDE mmol/L 107 106   CO2 mmol/L 25.4 26.7   BUN mg/dL 16 14   CREATININE mg/dL 0.86 0.81   GLUCOSE mg/dL 89 83   CALCIUM mg/dL 9.1 9.2   AST (SGOT) U/L  --  21   ALT (SGPT) U/L  --  8     Results from last 7 days   Lab Units 12/27/24  1156 12/27/24  1042   HSTROP T ng/L 31* 28*     Results from last 7 days   Lab Units 12/28/24  0338 12/27/24  1042   WBC 10*3/mm3 8.10 5.39   HEMOGLOBIN g/dL 13.7 13.6 " "  HEMATOCRIT % 41.4 40.6   PLATELETS 10*3/mm3 157 163     Results from last 7 days   Lab Units 12/27/24  1042   INR  1.08   APTT seconds 30.6     Results from last 7 days   Lab Units 12/28/24  0338 12/27/24  1042   MAGNESIUM mg/dL 2.1 2.0           Invalid input(s): \"LDLCALC\"  Results from last 7 days   Lab Units 12/27/24  1042   PROBNP pg/mL 383.0     Results from last 7 days   Lab Units 12/27/24  1042   TSH uIU/mL 1.340       Recent Radiology:  Imaging Results (Most Recent)       Procedure Component Value Units Date/Time    CT Angiogram Head w AI Analysis of LVO [[id number]] Collected: 12/27/24 1234     Updated: 12/27/24 1250    Narrative:      CT ANGIOGRAM HEAD W AI ANALYSIS OF LVO, CT ANGIOGRAM NECK    Date of Exam: 12/27/2024 11:28 AM EST    Indication: Stroke, follow up  Neuro deficit, acute, stroke suspected  Acute Stroke.    Comparison: CTA head July 16, 2024    Technique: CTA of the head and neck was performed after the uneventful intravenous administration of iodinated contrast. Reconstructed coronal and sagittal images were also obtained. In addition, a 3-D volume rendered image was created for   interpretation. Automated exposure control and iterative reconstruction methods were used.      Findings:  There is hard plaque noted in the area of the carotid bifurcations/proximal internal carotid arteries. Degree of stenosis in the left internal carotid artery is around 40% by NASCET criteria and 36 % on the right by NASCET criteria. The left vertebral   artery is dominant. The right vertebral artery is diminutive in size and may end in a PICA. The basilar artery is grossly unremarkable. There does not appear to be significant abnormality of the middle cerebral arteries or posterior cerebral arteries.   There is lack of an A1 segment on the right which could relate to hypoplastic vessel.    As has been noted there is an aneurysm of the anterior communicating artery. This measures about 1.1 x 0.9 cm previously " measuring about 1 x 0.83 cm. The A2 segments do not appear abnormal.    There is streak artifact along the left cerebral hemisphere from cochlear implant.      Impression:      Impression:  1.Atherosclerotic changes are noted involving the carotid arteries.  2.Dominant left vertebral artery. The right vertebral artery is diminutive in size and may end in a PICA.  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  4.Left cochlear implant.        Electronically Signed: Rc Samano MD    12/27/2024 12:48 PM EST    Workstation ID: [id number]    CT Angiogram Neck [[id number]] Collected: 12/27/24 1234     Updated: 12/27/24 1250    Narrative:      CT ANGIOGRAM HEAD W AI ANALYSIS OF LVO, CT ANGIOGRAM NECK    Date of Exam: 12/27/2024 11:28 AM EST    Indication: Stroke, follow up  Neuro deficit, acute, stroke suspected  Acute Stroke.    Comparison: CTA head July 16, 2024    Technique: CTA of the head and neck was performed after the uneventful intravenous administration of iodinated contrast. Reconstructed coronal and sagittal images were also obtained. In addition, a 3-D volume rendered image was created for   interpretation. Automated exposure control and iterative reconstruction methods were used.      Findings:  There is hard plaque noted in the area of the carotid bifurcations/proximal internal carotid arteries. Degree of stenosis in the left internal carotid artery is around 40% by NASCET criteria and 36 % on the right by NASCET criteria. The left vertebral   artery is dominant. The right vertebral artery is diminutive in size and may end in a PICA. The basilar artery is grossly unremarkable. There does not appear to be significant abnormality of the middle cerebral arteries or posterior cerebral arteries.   There is lack of an A1 segment on the right which could relate to hypoplastic vessel.    As has been noted there is an aneurysm of the anterior communicating artery. This measures about 1.1 x  0.9 cm previously measuring about 1 x 0.83 cm. The A2 segments do not appear abnormal.    There is streak artifact along the left cerebral hemisphere from cochlear implant.      Impression:      Impression:  1.Atherosclerotic changes are noted involving the carotid arteries.  2.Dominant left vertebral artery. The right vertebral artery is diminutive in size and may end in a PICA.  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  4.Left cochlear implant.        Electronically Signed: Rc Samano MD    12/27/2024 12:48 PM EST    Workstation ID: [id number]    CT Head Without Contrast Stroke Protocol [[id number]] Collected: 12/27/24 1120     Updated: 12/27/24 1127    Narrative:      CT HEAD WO CONTRAST STROKE PROTOCOL    Date of Exam: 12/27/2024 11:16 AM EST    Indication: Neuro deficit, acute, stroke suspected  Neuro deficit, acute, stroke suspected.    Comparison: CT head without and with contrast 1/5/2023. CT angiography 7/6/2023.    Technique: Axial CT images were obtained of the head without contrast administration.  Reconstructed coronal and sagittal images were also obtained. Automated exposure control and iterative construction methods were used.    Scan Time: 12/27/2024 at 1115  Results discussed with emergency room desk staff at 12/27/2024 at 11:25 a.m..      Findings:  Left parietal implant creates extensive streak artifact obscuring several images. The images are also degraded by motion. Extensive deep white matter hypodensities are nonspecific but favored to reflect changes of advanced chronic microvascular disease.   No acute or evolving large territory infarct is identified.    Oval 1.0 x 0.8 cm cm hyperdense focus anterior to the third ventricle corresponds to known anterior communicating artery aneurysm, demonstrated to better advantage on prior CT angiography imaging. It is not thought to be significantly changed. No gross   acute intracranial hemorrhage is identified.    No  acute calvarial abnormality is seen. Paranasal sinuses and mastoid air cells are clear. Intracranial carotid artery calcifications are present.      Impression:      Impression:    1. The study is degraded by both motion artifact and streak artifact related to left parietal implant.  2. No acute intracranial findings are seen.  3. 1 cm anterior communicating artery aneurysm appears grossly stable, corresponds to CTA head and neck findings from 7/6/2023.  4.. Advanced chronic microvascular disease.        Electronically Signed: Roxanne Jones MD    12/27/2024 11:25 AM EST    Workstation ID: [id number]    XR Chest 1 View [[id number]] Collected: 12/27/24 1058     Updated: 12/27/24 1103    Narrative:      XR CHEST 1 VW    Date of Exam: 12/27/2024 10:47 AM EST    Indication: weakness cough    Comparison: None available.    Findings:  Prior CABG is noted. No evidence of pneumothorax, pleural effusion or focal airspace disease. Calcified granuloma in the right lateral upper lobe.      Impression:      Impression:  No acute radiographic abnormality.      Electronically Signed: Dilan Partida MD    12/27/2024 11:01 AM EST    Workstation ID: [id number]              ECHOCARDIOGRAM:                    Assessment:         Active Hospital Problems    Diagnosis  POA    **Symptomatic bradycardia [R00.1]  Yes     Fatigue  Bradycardia- hold atenolol (home dose 12.5mg daily)  CAD s/p CABG 2001 with LIMA to LAD, SVG to D1, SVG to OM, SVG to PDA  Dyslipidemia  Hyperglycemia  Anemia  Thrombocytopenia  former smoker     Plan:   Patient very [place] but was brought to ER by family as he had been more fatigued, somnolent recently  He is bradycardic with heart rate in the 40s. He is on atenolol 12.5mg daily at home  Hold atenolol  Monitor on telemetry  Consider EP consult Monday for possible PPM vs. Outpt monitor  ECHO ordered               PIERRE Potter  12/28/24  09:51 EST

## 2024-12-28 NOTE — PROGRESS NOTES
"[facility] MEDICINE SERVICE  DAILY PROGRESS NOTE    NAME: Royal Beckman  : 1935  MRN: [id number]      LOS: 1 day     PROVIDER OF SERVICE: Jewels Cordova PA-C    Chief Complaint: Symptomatic bradycardia    Subjective:     Interval History:  History taken from: patient chart RN    Patient is very [place].  He does not know why he was brought to the hospital in the first placed.  When asked about his heart rate, he states it has been low \"for a long time\".  He does report some generalized weakness but denies near-syncope, syncope, headaches, vision changes, palpitations, CP, SOA. He has dementia therefore hx limited.         Review of Systems: UTO ROS due to underlying dementia  Review of Systems    Objective:     Vital Signs  Temp:  [97.6 °F (36.4 °C)-98.5 °F (36.9 °C)] 97.6 °F (36.4 °C)  Heart Rate:  [40-67] 42  Resp:  [10-20] 12  BP: (103-138)/(48-86) 118/48   Body mass index is 30.12 kg/m².    Physical Exam  Physical Exam  Constitutional:       Appearance: Normal appearance.   HENT:      Head: Normocephalic and atraumatic.      Mouth/Throat:      Mouth: Mucous membranes are moist.   Eyes:      Extraocular Movements: Extraocular movements intact.   Cardiovascular:      Rate and Rhythm: Regular rhythm. Bradycardia present.   Pulmonary:      Effort: Pulmonary effort is normal.      Breath sounds: Normal breath sounds.   Abdominal:      General: Abdomen is flat.      Palpations: Abdomen is soft.      Tenderness: There is no abdominal tenderness.   Musculoskeletal:      Cervical back: Normal range of motion.      Right lower leg: No edema.      Left lower leg: No edema.   Skin:     General: Skin is warm.   Neurological:      General: No focal deficit present.      Mental Status: He is alert. He is disoriented.            Diagnostic Data    Results from last 7 days   Lab Units 24  0338 24  1042   WBC 10*3/mm3 8.10 5.39   HEMOGLOBIN g/dL 13.7 13.6   HEMATOCRIT % 41.4 40.6   PLATELETS 10*3/mm3 157 " 163   GLUCOSE mg/dL 89 83   CREATININE mg/dL 0.86 0.81   BUN mg/dL 16 14   SODIUM mmol/L 142 142   POTASSIUM mmol/L 3.5 3.9   AST (SGOT) U/L  --  21   ALT (SGPT) U/L  --  8   ALK PHOS U/L  --  115   BILIRUBIN mg/dL  --  0.8   ANION GAP mmol/L 9.6 9.3       CT Angiogram Head w AI Analysis of LVO    Result Date: 12/27/2024  Impression: 1.Atherosclerotic changes are noted involving the carotid arteries. 2.Dominant left vertebral artery. The right vertebral artery is diminutive in size and may end in a PICA. 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 4.Left cochlear implant. Electronically Signed: Rc Samano MD  12/27/2024 12:48 PM EST  Workstation ID: [id number]    CT Angiogram Neck    Result Date: 12/27/2024  Impression: 1.Atherosclerotic changes are noted involving the carotid arteries. 2.Dominant left vertebral artery. The right vertebral artery is diminutive in size and may end in a PICA. 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 4.Left cochlear implant. Electronically Signed: Rc Samano MD  12/27/2024 12:48 PM EST  Workstation ID: [id number]    CT Head Without Contrast Stroke Protocol    Result Date: 12/27/2024  Impression: 1. The study is degraded by both motion artifact and streak artifact related to left parietal implant. 2. No acute intracranial findings are seen. 3. 1 cm anterior communicating artery aneurysm appears grossly stable, corresponds to CTA head and neck findings from 7/6/2023. 4.. Advanced chronic microvascular disease. Electronically Signed: Roxanne Jonse MD  12/27/2024 11:25 AM EST  Workstation ID: [id number]    XR Chest 1 View    Result Date: 12/27/2024  Impression: No acute radiographic abnormality. Electronically Signed: Dilan Partida MD  12/27/2024 11:01 AM EST  Workstation ID: [id number]       I reviewed the patient's new clinical results.    Assessment/Plan:     Active and Resolved Problems  Active Hospital Problems     Diagnosis  POA    **Symptomatic bradycardia [R00.1]  Yes      Resolved Hospital Problems   No resolved problems to display.       Symptomatic bradycardia  -Patient noted to have bradycardia in the 30s and 40s on scene and was given atropine by EMS.  He is on atenolol at home but was reported he has not taken this in a few days.  This medication was held on admission..  He remains bradycardic in the 40s.  Difficult to gauge how symptomatic he is given his underlying dementia.  -Given persistent bradycardia, will consult cardiology/EP for evaluation  -TSH wnl  -Troponin mildly elevated 28->31 but he denies any chest pain or other anginal equivalents    ? Left-sided weakness and tremors  Anterior communicating artery aneurism   -Code stroke was called while in ED due to report of left-sided weakness, tremor and headache in the setting of underlying brain aneurysm.  On exam patient does not have lateralizing weakness and hx is inconsistent.  -CTH showed stable ACOM.  CTA H/N shows no LVO or flow-limiting stenosis.  Neurology was consulted and recommend MRI brain and continue aspirin. He is already on atorvastatin  -Patient unable to have MRI due to deep brain stimulator.    Dementia  -Pleasant and cooperative, does not know why he was brought to the ED in the first place  -Continue memantine and donepezil    HTN  -Blood pressure was running slightly low at home per recent PCP office note and amlodipine was decreased to 5mg.  I have changed amlodipine dose to match what he was on at home.  Blood pressure wnl.    VTE Prophylaxis:  Mechanical VTE prophylaxis orders are present.             Disposition Planning:     Barriers to Discharge:cardiology consult  Anticipated Date of Discharge: 12/29 or 12/30  Place of Discharge: UTD, pending PT/OT eval      Time: 40 minutes     There are no questions and answers to display.       Signature: Electronically signed by Jewels Cordova PA-C, 12/28/24, 12:45 EST.  Romelia Martínez  Hospitalist Team

## 2024-12-28 NOTE — PLAN OF CARE
Goal Outcome Evaluation:      MD Kashmir evaluated the pt because he did not answer to his son. Responding with stimuli . Ordered CT angio brain stat. Called CT dept.  Administered Keppra 1500 mg/iv once per order. Continued to monitor.

## 2024-12-28 NOTE — PLAN OF CARE
Problem: Adult Inpatient Plan of Care  Goal: Plan of Care Review  Flowsheets (Taken 12/28/2024 8903)  Plan of Care Reviewed With: patient   Goal Outcome Evaluation:  Plan of Care Reviewed With: patient   Patient admitted gio via ED with symptomatic bradycardia, cardiology consulted, had episodes of confusion, safety precautions maintained, resting at this time, will continue to monitor.

## 2024-12-28 NOTE — PROGRESS NOTES
LOS: 1 day     Chief Complaint: Symptomatic bradycardia       SUBJECTIVE:    History taken from: patient chart family RN    Interval History: Royal Beckman was admitted on 12/27/2024 12/28/24: 5 by nursing the patient is not following commands and only withdrawing to pain.  Patient was immediately assessed and seem to be resisting opening his eyes, neck was supple with negative Kernig and Brudzinski signs.  On the monitor the patient was bradycardic in the 40s however he was perfusing adequately with a systolic in the 120s.  Pupils pinpoint but reactive, breathing comfortably on room air and intermittently opening his eyes to look at me when I was going to pain him and then would close his eyes when I made eye contact.  Unclear if this is a behavioral disturbance versus metabolic encephalopathy.  Given the patient's ACOM aneurysm we will send him for repeat stat CT and CTA to assess for a rupture.  Spouse and son at bedside report similar presentation yesterday prior to arrival however this seems to be a longer episode.  Additionally he does have some bilateral upper extremity tremors that seem to have worsened according to the family.  He did not have tremors yesterday.  Patient was given a one-time IV levetiracetam load of 1500 mg.    We discussed with the family in the event that there is a aneurysmal rupture, the patient had stated he would not like surgery performed as this was discussed at the last visit with .     History of present illness: Background per H&P: Royal Beckman is a 89 y.o. 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 Huerta last seen 8/16/2024 and Dr. Seipel last seen 6/11/24. He has not had any growth at his last visit in August of the aneurysm.      He presents today for symptomatic bradycardia in the 40s requiring a dose of atropine and responsive to the 70s with adequate  perfusion.      He was in the ED doing well when he had complained of a headache and there was concern for LUE weakness and whole body tremors. A code stroke was called given his h/o an anerysm and there was a concern for hemorrhage.      On my examination the patient denies headache, has whole body tremors/shaking but is able to antigravity all extremities equally well. He had some mild dysarthria due to his jaw jerking, otherwise his exam was unremarkable. Pupils symmetric, he is alert, following commands and has no focal deficits. Neurointact.      CTH had sig artifact from the cochlear implant, however hemorrhage is not grossly evident, but difficult to exclude. Unfortunately he likely cannot get a MRI due to the cochlear implant. The patient states he is shaking because was very cold. Denied N/V, headache or lateralizing weakness. No visual or speech disturbance.         NIHSS 1  GCS 15  /70  - Portions of the above HPI were copied from previous encounters and edited as appropriate.    Patient Complaints: n/a      Review of Systems       Pertinent PMH:  has a past medical history of Anemia, Aneurysm, B12 deficiency, Coronary artery disease, Deep vein thrombosis (10/2001), Hyperglycemia, Hyperlipidemia, Hypertension, Knee swelling (wiyhin the past several months), Low back pain (6 to 8 months ago), Low back strain, Myocardial infarction, Neck strain, and Peripheral neuropathy (don't remember).   ________________________________________________     OBJECTIVE:      Gen: Laying in bed, eyes closed, not following commands, neck supple  CVS: Bradycardia, HR 43  CHEST: No signs of resp distress  Neuro:  MS: sleepy, awakens to repeated stimuli, not attentive, sometime tracks but not following commands.  Language: Not following simple commands, occasionally opening his eyes and questionably tracking .  CNs: Pupils b/l equal 2mm, reactive, EOMI seems intact, face symmetric, tongue midline. Rest of cranial nerves  are intact.  Motor: Limited due to patient not following commands but withdraws to pain in all extrenities equally.  Sensory: reacts to pain in all extremities  Reflexes: 2/4 throughout, bilateral flexor plantar response, no Dawn's, no clonus  Coordination: no evident nystagmus or ataxia  Gait: deferred due to mental status      NIH Stroke Scale  Interval: baseline  1a. Level of Consciousness: 0-->Alert, keenly responsive  1b. LOC Questions: 0-->Answers both questions correctly  1c. LOC Commands: 0-->Performs both tasks correctly  2. Best Gaze: 0-->Normal  3. Visual: 0-->No visual loss  4. Facial Palsy: 0-->Normal symmetrical movements  5a. Motor Arm, Left: 0-->No drift, limb holds 90 (or 45) degrees for full 10 secs  5b. Motor Arm, Right: 0-->No drift, limb holds 90 (or 45) degrees for full 10 secs  6a. Motor Leg, Left: 0-->No drift, leg holds 30 degree position for full 5 secs  6b. Motor Leg, Right: 0-->No drift, leg holds 30 degree position for full 5 secs  7. Limb Ataxia: 0-->Absent  8. Sensory: 0-->Normal, no sensory loss  9. Best Language: 0-->No aphasia, normal  10. Dysarthria: 1-->Mild-to-moderate dysarthria, patient slurs at least some words and, at worst, can be understood with some difficulty  11. Extinction and Inattention (formerly Neglect): 0-->No abnormality  Total (NIH Stroke Scale): 1         ________________________________________________   RESULTS REVIEW    VITAL SIGNS:  Temp:  [97.4 °F (36.3 °C)-98.5 °F (36.9 °C)] 98 °F (36.7 °C)  Heart Rate:  [40-67] 51  Resp:  [10-16] 16  BP: (116-138)/(48-59) 129/58    LABS:       Lab 12/28/24  0338 12/27/24  1042   WBC 8.10 5.39   HEMOGLOBIN 13.7 13.6   HEMATOCRIT 41.4 40.6   PLATELETS 157 163   NEUTROS ABS 5.39 3.30   IMMATURE GRANS (ABS) 0.02 0.01   LYMPHS ABS 1.82 1.44   MONOS ABS 0.70 0.49   EOS ABS 0.14 0.12   MCV 99.3* 99.5*   PROTIME  --  14.0   APTT  --  30.6         Lab 12/28/24  0338 12/27/24  1042   SODIUM 142 142   POTASSIUM 3.5 3.9   CHLORIDE  107 106   CO2 25.4 26.7   ANION GAP 9.6 9.3   BUN 16 14   CREATININE 0.86 0.81   EGFR 83.8 85.3   GLUCOSE 89 83   CALCIUM 9.1 9.2   MAGNESIUM 2.1 2.0   PHOSPHORUS 3.3  --    HEMOGLOBIN A1C 5.28  --    TSH  --  1.340         Lab 12/27/24  1042   TOTAL PROTEIN 5.9*   ALBUMIN 3.3*   GLOBULIN 2.6   ALT (SGPT) 8   AST (SGOT) 21   BILIRUBIN 0.8   ALK PHOS 115         Lab 12/27/24  1156 12/27/24  1042   PROBNP  --  383.0   HSTROP T 31* 28*   PROTIME  --  14.0   INR  --  1.08             Lab 12/28/24  0338 12/27/24  1150   FOLATE 3.96*  --    VITAMIN B 12 1,560*  --    ABO TYPING  --  A   RH TYPING  --  Positive   ANTIBODY SCREEN  --  Negative             Lab Results   Component Value Date    TSH 1.340 12/27/2024    LDL 71 12/19/2024    HGBA1C 5.28 12/28/2024    [id number] 1,560 (H) 12/28/2024         IMAGING STUDIES:  CT Angiogram Head w AI Analysis of LVO    Result Date: 12/27/2024  Impression: 1.Atherosclerotic changes are noted involving the carotid arteries. 2.Dominant left vertebral artery. The right vertebral artery is diminutive in size and may end in a PICA. 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 4.Left cochlear implant. Electronically Signed: Rc Samano MD  12/27/2024 12:48 PM EST  Workstation ID: [id number]    CT Angiogram Neck    Result Date: 12/27/2024  Impression: 1.Atherosclerotic changes are noted involving the carotid arteries. 2.Dominant left vertebral artery. The right vertebral artery is diminutive in size and may end in a PICA. 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 4.Left cochlear implant. Electronically Signed: Rc Samano MD  12/27/2024 12:48 PM EST  Workstation ID: [id number]    CT Head Without Contrast Stroke Protocol    Result Date: 12/27/2024  Impression: 1. The study is degraded by both motion artifact and streak artifact related to left parietal implant. 2. No acute intracranial findings are seen. 3. 1 cm anterior  communicating artery aneurysm appears grossly stable, corresponds to CTA head and neck findings from 7/6/2023. 4.. Advanced chronic microvascular disease. Electronically Signed: Roxanne Jones MD  12/27/2024 11:25 AM EST  Workstation ID: [id number]    XR Chest 1 View    Result Date: 12/27/2024  Impression: No acute radiographic abnormality. Electronically Signed: Dilan Partida MD  12/27/2024 11:01 AM EST  Workstation ID: [id number]     I reviewed the patient's new clinical results.    ________________________________________________      PROBLEM LIST:    Symptomatic bradycardia        ASSESSMENT/PLAN:  Symptomatic bradycardia   H/o anterior communicating artery aneurysm followed with an IR team, Dr. Huerta.  Code stroke called for concern of aneurysmal rupture due to headache.   H/o dementia  Patient symptoms seem inconsistent, he initially told ED staff he had a headache and weakness on his left side with tremors, however on my examination I did not notice any lateralizing deficits and he did not complain of a headache.  Patient does have a history of dementia and this is a possible behavioral disturbance, however given the negative imaging and his history of aneurysm with left-sided symptoms we will still obtain an MRI brain if able to rule out an acute ischemic event.       AMS    12/28: Per primary RN, patient had a acute change in mentation over the last hour now unresponsive withdrawing to pain.  Bradycardic with normotension.  Acute altered mental status previously at baseline.  Last known well 1600.  STAT repeat CT head WO and CTA head W to assess for intracerebral hemorrhage and aneurysm structure.  NCCT: stable 1 cm ACOM aneurysm compared to scan from 7/6/23. Streak and motion artifact, bu no acute intracranial findings.   CTA H/N: Negative for LVO, flow-limiting stenosis.  Stable appearing ACOM aneurysm measuring 1 x 0.83 cm previously it was measured at 1.1 x 0.9 cm approximately the same.  MRI Brain WO  if able  Baseline EKG and chest x-ray  Echocardiogram with bubble study  Continuous cardiac telemetry to monitor for arrhythmia  Continue with home dose aspirin 81 mg daily   Stroke labwork: HgbA1C, lipid panel, urine drug screen  Acetaminophen 640 mg PO every 4 hours for temperature >99F  High intensity statin therapy for LDL >70 (to be started prior to discharge)  Tight glucose control (long-term goal HgbA1c < 6%)  Physical/Occupational/Speech therapy: Evaluate and treat  Vital signs per hospital protocol  Neuroassessment per hospital protocol  Please document NIHSS on admission and with any neurochanges  Strict NPO until bedside swallow study, advance diet as appropriate  Oxygen therapy (titrate to keep SpO2 greater than 94%  Activity as tolerated  Stroke education and counseling  Recommend outpatient follow-up with Dr. Huerta (vascular neuro) and Dr. Seipel (neuro)       2. Bilateral upper extremity tremors, intermittent  - EEG   - Load levetiracetam 1500 mg IV once     3. Dementia  C/w home dose Aricept  C/w home dose memantine    4.  Bradycardia  Cardiology following recommending holding home atenolol  Continue on telemetry  EP consult Monday for possible PPM versus outpatient monitor  2D echocardiogram        **Please refer to previous notes for further details and recommendations.     I discussed the patient's findings and my recommendations with patient, family, and nursing staff    Leonard Marlow MD  12/28/24  16:16 EST

## 2024-12-29 LAB
ANION GAP SERPL CALCULATED.3IONS-SCNC: 8.3 MMOL/L (ref 5–15)
BASOPHILS # BLD AUTO: 0.02 10*3/MM3 (ref 0–0.2)
BASOPHILS NFR BLD AUTO: 0.3 % (ref 0–1.5)
BUN SERPL-MCNC: 12 MG/DL (ref 8–23)
BUN/CREAT SERPL: 17.6 (ref 7–25)
CALCIUM SPEC-SCNC: 8.6 MG/DL (ref 8.6–10.5)
CHLORIDE SERPL-SCNC: 108 MMOL/L (ref 98–107)
CO2 SERPL-SCNC: 24.7 MMOL/L (ref 22–29)
CREAT SERPL-MCNC: 0.68 MG/DL (ref 0.76–1.27)
DEPRECATED RDW RBC AUTO: 52 FL (ref 37–54)
EGFRCR SERPLBLD CKD-EPI 2021: 90 ML/MIN/1.73
EOSINOPHIL # BLD AUTO: 0.11 10*3/MM3 (ref 0–0.4)
EOSINOPHIL NFR BLD AUTO: 1.6 % (ref 0.3–6.2)
ERYTHROCYTE [DISTWIDTH] IN BLOOD BY AUTOMATED COUNT: 14.4 % (ref 12.3–15.4)
GLUCOSE SERPL-MCNC: 91 MG/DL (ref 65–99)
HCT VFR BLD AUTO: 39.8 % (ref 37.5–51)
HGB BLD-MCNC: 13.1 G/DL (ref 13–17.7)
IMM GRANULOCYTES # BLD AUTO: 0.01 10*3/MM3 (ref 0–0.05)
IMM GRANULOCYTES NFR BLD AUTO: 0.1 % (ref 0–0.5)
LYMPHOCYTES # BLD AUTO: 1.57 10*3/MM3 (ref 0.7–3.1)
LYMPHOCYTES NFR BLD AUTO: 23.4 % (ref 19.6–45.3)
MAGNESIUM SERPL-MCNC: 2.1 MG/DL (ref 1.6–2.4)
MCH RBC QN AUTO: 32.6 PG (ref 26.6–33)
MCHC RBC AUTO-ENTMCNC: 32.9 G/DL (ref 31.5–35.7)
MCV RBC AUTO: 99 FL (ref 79–97)
MONOCYTES # BLD AUTO: 0.57 10*3/MM3 (ref 0.1–0.9)
MONOCYTES NFR BLD AUTO: 8.5 % (ref 5–12)
NEUTROPHILS NFR BLD AUTO: 4.44 10*3/MM3 (ref 1.7–7)
NEUTROPHILS NFR BLD AUTO: 66.1 % (ref 42.7–76)
NRBC BLD AUTO-RTO: 0 /100 WBC (ref 0–0.2)
PHOSPHATE SERPL-MCNC: 2.6 MG/DL (ref 2.5–4.5)
PLATELET # BLD AUTO: 163 10*3/MM3 (ref 140–450)
PMV BLD AUTO: 10.6 FL (ref 6–12)
POTASSIUM SERPL-SCNC: 4.2 MMOL/L (ref 3.5–5.2)
QT INTERVAL: 519 MS
QTC INTERVAL: 479 MS
RBC # BLD AUTO: 4.02 10*6/MM3 (ref 4.14–5.8)
SODIUM SERPL-SCNC: 141 MMOL/L (ref 136–145)
WBC NRBC COR # BLD AUTO: 6.72 10*3/MM3 (ref 3.4–10.8)

## 2024-12-29 PROCEDURE — 99232 SBSQ HOSP IP/OBS MODERATE 35: CPT | Performed by: INTERNAL MEDICINE

## 2024-12-29 PROCEDURE — 83735 ASSAY OF MAGNESIUM: CPT

## 2024-12-29 PROCEDURE — 99233 SBSQ HOSP IP/OBS HIGH 50: CPT | Performed by: STUDENT IN AN ORGANIZED HEALTH CARE EDUCATION/TRAINING PROGRAM

## 2024-12-29 PROCEDURE — 85025 COMPLETE CBC W/AUTO DIFF WBC: CPT

## 2024-12-29 PROCEDURE — 80048 BASIC METABOLIC PNL TOTAL CA: CPT

## 2024-12-29 PROCEDURE — 97162 PT EVAL MOD COMPLEX 30 MIN: CPT

## 2024-12-29 PROCEDURE — 84100 ASSAY OF PHOSPHORUS: CPT

## 2024-12-29 RX ADMIN — DONEPEZIL HYDROCHLORIDE 10 MG: 5 TABLET, FILM COATED ORAL at 08:43

## 2024-12-29 RX ADMIN — Medication 10 ML: at 20:15

## 2024-12-29 RX ADMIN — Medication 1000 MCG: at 08:43

## 2024-12-29 RX ADMIN — ATORVASTATIN CALCIUM 20 MG: 20 TABLET, FILM COATED ORAL at 08:43

## 2024-12-29 RX ADMIN — SERTRALINE HYDROCHLORIDE 50 MG: 50 TABLET, FILM COATED ORAL at 08:43

## 2024-12-29 RX ADMIN — Medication 5000 UNITS: at 08:43

## 2024-12-29 RX ADMIN — MEMANTINE 10 MG: 10 TABLET ORAL at 20:15

## 2024-12-29 RX ADMIN — AMLODIPINE BESYLATE 5 MG: 5 TABLET ORAL at 08:43

## 2024-12-29 RX ADMIN — DONEPEZIL HYDROCHLORIDE 10 MG: 5 TABLET, FILM COATED ORAL at 20:15

## 2024-12-29 RX ADMIN — MEMANTINE 10 MG: 10 TABLET ORAL at 08:43

## 2024-12-29 RX ADMIN — ASPIRIN 81 MG: 81 TABLET, COATED ORAL at 08:43

## 2024-12-29 RX ADMIN — Medication 10 ML: at 08:57

## 2024-12-29 NOTE — NURSING NOTE
Pt is confused, took off all the heart leads and o2 probe, reoriented and explained the need to keep them on, patient is adamant that he wanted them off, also pulled two IV lines, and refusing to be stick at this time, will re attempt later.

## 2024-12-29 NOTE — PLAN OF CARE
Goal Outcome Evaluation:         Pt alert and oriented x 3. Cooperative without against. Spouse at bedside. To be done EEG tomorrow.  Bradycardic with no symptoms.

## 2024-12-29 NOTE — PLAN OF CARE
Goal Outcome Evaluation:  Plan of Care Reviewed With: patient           Outcome Evaluation: 89 y/o male admitted on 12/27 due to HR in 40s ; patient apparently did not take his medications. While in ED , code stroke called due to onset of headache and tremors. CT showed no acute intracranial process despite movement and implant artifact; + stable aneurysm. PMH includes dementia, htn, cochlear implant. Patient reports residing at home with spouse and using rw for mobility. At time of eval, patient needed min/mod A for supine to sit. Min A needed for transfers and noted HR remains in 40s thus returned supine with SBA and gait deferred. Will continue to follow up while in hospital and progress as tolerated.    Anticipated Discharge Disposition (PT): home with 24/7 care, home with home health

## 2024-12-29 NOTE — CONSULTS
Cardiology [place]        Subjective:       Cardiology assessment and plan    Bradycardia  Sinus bradycardia  Fatigue  Dizziness  History of falls at home  Coronary artery disease prior coronary bypass surgery  Hypertension  Hyperlipidemia  Anemia  Thrombocytopenia  Unable to get much history from the patient  Patient has some confusion and also underlying dementia  Patient noted to be in bradycardia with sinus bradycardia complaining of being fatigued and weakness and dizzy at home  Atenolol was discontinued  Sinus bradycardia with a heart rate in 40s  History of dementia  Patient was seen and evaluated by neurology today  Twelve-lead EKG shows sinus bradycardia with left bundle branch block  CT head with no acute intracranial findings  Heart rate is somewhat better after discontinuing the beta-blocker  No new complaints  Unable to get much history from the patient  Tmax is 97.2 pulse is 47-50 respirations are 14 blood pressure 108/48  Sodium is 141 potassium is 4.2 creatinine is 0.6 hemoglobin is 13  Echocardiogram with LV ejection fraction of 45 to 50%  Mild aortic stenosis  Avoid AV hoda blocking medications  Current medications include aspirin 81 mg p.o. once a day Norvasc 5 mg p.o. once a day Lipitor 20 mg p.o. once a day  Continue close monitoring and follow-up  Further recommendation based on patient course            Encounter Date:12/27/2024      Patient ID: Royal Beckman is a 89 y.o. male.    Chief Complaint: fatigue  Cardiology Consult: bradycardia    Referring Physician: Krissy Haas APRN     HPI: Patient is very [place]  Royal Beckman is a 89 y.o. male who presents with fatigue and noted to be bradycardic.  Mr. Beckman is a patient of Dr. Bae. Pmh includes CAD,NSTEMI, CABG 2001 with LIMA to LAD, SVG to D1, SVG to OM, SVG to PDA, Bradycardia, dyslipidemia, hyperglycemia, anemia, thrombocytopenia, former smoker.    Mr. Beckman presented to ER with complaints of weakness over the last  several days. He has not felt well according to documentation. Patient is very [place] and on my encounter he does not recall why he is here. He was noted to be bradycardic with hear rate in the 40s and was given atropine by EMS.    Work up in ER revealed headache, patient had some tremors which prompted a CODE Stroke with acute work up CTA head neck showing stable aneurysm, neurology consulted. HS troponin 31, 28, TSH 1.3, T4 1.04, PT 14.0, INR 1.08, MCV 99.5, MCH 33.3. Covid/Flu PCR negative, CXR negative. EKG shows SB with LBBB. At home patient is on atenolol 12.5mg daily.         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    Hyperglycemia     Hyperlipidemia     Hypertension     Knee swelling wiyhin the past several months    Low back pain 6 to 8 months ago    Low back strain     Myocardial infarction     Neck strain     Peripheral neuropathy don't remember       Past Surgical History:   Procedure Laterality Date    CARDIAC CATHETERIZATION      COLONOSCOPY      CORONARY ARTERY BYPASS GRAFT         Family History   Problem Relation Age of Onset    Aneurysm Father     Heart disease Brother         he passe away several tears ago       Social History     Socioeconomic History    Marital status:    Tobacco Use    Smoking status: Former     Current packs/day: 0.00     Average packs/day: 1.5 packs/day for 5.2 years (7.8 ttl pk-yrs)     Types: Cigarettes     Start date: 10/10/1964     Quit date: 12/15/1969     Years since quittin.0     Passive exposure: Past    Smokeless tobacco: Never   Vaping Use    Vaping status: Never Used   Substance and Sexual Activity    Alcohol use: Yes     Comment: occasional, only    Drug use: No    Sexual activity: Not Currently     Partners: Female         No Known Allergies    Current Medications:   Scheduled Meds:amLODIPine, 5 mg, Oral, Daily  aspirin, 81 mg,  "Oral, Daily  atorvastatin, 20 mg, Oral, Daily  donepezil, 10 mg, Oral, BID  memantine, 10 mg, Oral, Q12H  sertraline, 50 mg, Oral, Daily  sodium chloride, 10 mL, Intravenous, Q12H  vitamin B-12, 1,000 mcg, Oral, Daily  vitamin D3, 5,000 Units, Oral, Daily      Continuous Infusions:     Review of Systems   Unable to perform ROS: Dementia   Constitutional: Positive for malaise/fatigue.            Objective:         /48 (BP Location: Right arm, Patient Position: Lying)   Pulse 50   Temp 97.2 °F (36.2 °C) (Temporal)   Resp 13   Ht 165.1 cm (65\")   Wt 82.1 kg (181 lb)   SpO2 97%   BMI 30.12 kg/m²     Physical Exam:  General Appearance:    Alert, cooperative, in no acute distress, very [place]                                Head: Atraumatic, normocephalic, PERRLA               Neck:   supple,  no JVD   Lungs:     Clear to auscultation,respirations regular, even and               unlabored    Heart:    Regular rhythm and bradycardic rate, normal S1 and S2 murmur   Abdomen:     Normal bowel sounds, no masses, no organomegaly, soft  nontender, nondistended, no guarding, no rebound  tenderness   Extremities:   Moves all extremities well, no edema, no cyanosis, no  redness   Pulses:   Pulses palpable and equal bilaterally   Skin:   No bleeding, bruising or rash   Neurologic:   Awake, alert, oriented x3                 ASCVD Risk Score::  The ASCVD Risk score (Dea DK, et al., 2019) failed to calculate for the following reasons:    The 2019 ASCVD risk score is only valid for ages 40 to 79      Lab Review:     Results from last 7 days   Lab Units 12/29/24  0008 12/28/24  0338 12/27/24  1042   SODIUM mmol/L 141 142 142   POTASSIUM mmol/L 4.2 3.5 3.9   CHLORIDE mmol/L 108* 107 106   CO2 mmol/L 24.7 25.4 26.7   BUN mg/dL 12 16 14   CREATININE mg/dL 0.68* 0.86 0.81   GLUCOSE mg/dL 91 89 83   CALCIUM mg/dL 8.6 9.1 9.2   AST (SGOT) U/L  --   --  21   ALT (SGPT) U/L  --   --  8     Results from last 7 days   Lab Units " "12/27/24  1156 12/27/24  1042   HSTROP T ng/L 31* 28*     Results from last 7 days   Lab Units 12/29/24  0008 12/28/24  0338   WBC 10*3/mm3 6.72 8.10   HEMOGLOBIN g/dL 13.1 13.7   HEMATOCRIT % 39.8 41.4   PLATELETS 10*3/mm3 163 157     Results from last 7 days   Lab Units 12/27/24  1042   INR  1.08   APTT seconds 30.6     Results from last 7 days   Lab Units 12/29/24  0008 12/28/24  0338   MAGNESIUM mg/dL 2.1 2.1           Invalid input(s): \"LDLCALC\"  Results from last 7 days   Lab Units 12/27/24  1042   PROBNP pg/mL 383.0     Results from last 7 days   Lab Units 12/27/24  1042   TSH uIU/mL 1.340       Recent Radiology:  Imaging Results (Most Recent)       Procedure Component Value Units Date/Time    CT Angiogram Head [[id number]] Collected: 12/28/24 1826     Updated: 12/28/24 1832    Narrative:      CT ANGIOGRAM HEAD    Date of Exam: 12/28/2024 5:02 PM EST    Indication: AMS.    Comparison: 12/27/2024    Technique: CTA of the head was performed after the uneventful intravenous administration of iodinated contrast. Reconstructed coronal and sagittal images were also obtained. In addition, a 3-D volume rendered image was created for interpretation.   Automated exposure control and iterative reconstruction methods were used.      Findings:  Vascular calcifications noted within the visualized cervical internal carotid arteries. Calcified plaques without hemodynamically significant stenoses noted within the bilateral intracranial ICAs.  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  The remainder the visualized intracranial structures are otherwise unremarkable  No abnormal arterial intracranial enhancement.  Please see dedicated head CT for any additional findings.      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    No acute abnormality of the intracranial arterial vessels.        Electronically Signed: Wood Cox,     " 12/28/2024 6:30 PM EST    Workstation ID: [id number]    CT Head Without Contrast [[id number]] Collected: 12/28/24 1701     Updated: 12/28/24 1707    Narrative:      CT HEAD WO CONTRAST    Date of Exam: 12/28/2024 5:00 PM EST    Indication: acute AMS.    Comparison: 12/27/2024    Technique: Axial CT images were obtained of the head without contrast administration.  Coronal reconstructions were performed.  Automated exposure control and iterative reconstruction methods were used.    Findings: No intracranial hemorrhage. Gray-white matter differentiation is maintained without evidence of an acute infarction. Multiple foci of decreased attenuation are present within the subcortical, deep cerebral, and periventricular white matter   consistent with chronic small vessel/microangiopathic ischemic changes. No extra-axial mass or collection. The ventricles and sulci are prominent commensurate with involutional changes. The posterior fossa appears grossly normal. Sellar and suprasellar   structures are normal. 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    Lens replacements. The paranasal sinuses, ethmoid air cells, and mastoid air cells are aerated. The bony calvarium is intact.      Impression:      Impression:  1.No acute intracranial finding.  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  3.Chronic changes including involutional changes and chronic small vessel ischemic changes.        Electronically Signed: Hardy Golden MD    12/28/2024 5:05 PM EST    Workstation ID: [id number]    CT Angiogram Head w AI Analysis of LVO [[id number]] Collected: 12/27/24 1234     Updated: 12/27/24 1250    Narrative:      CT ANGIOGRAM HEAD W AI ANALYSIS OF LVO, CT ANGIOGRAM NECK    Date of Exam: 12/27/2024 11:28 AM EST    Indication: Stroke, follow up  Neuro deficit, acute, stroke suspected  Acute Stroke.    Comparison: CTA head July 16, 2024    Technique: CTA of the head and neck was performed after the uneventful intravenous  administration of iodinated contrast. Reconstructed coronal and sagittal images were also obtained. In addition, a 3-D volume rendered image was created for   interpretation. Automated exposure control and iterative reconstruction methods were used.      Findings:  There is hard plaque noted in the area of the carotid bifurcations/proximal internal carotid arteries. Degree of stenosis in the left internal carotid artery is around 40% by NASCET criteria and 36 % on the right by NASCET criteria. The left vertebral   artery is dominant. The right vertebral artery is diminutive in size and may end in a PICA. The basilar artery is grossly unremarkable. There does not appear to be significant abnormality of the middle cerebral arteries or posterior cerebral arteries.   There is lack of an A1 segment on the right which could relate to hypoplastic vessel.    As has been noted there is an aneurysm of the anterior communicating artery. This measures about 1.1 x 0.9 cm previously measuring about 1 x 0.83 cm. The A2 segments do not appear abnormal.    There is streak artifact along the left cerebral hemisphere from cochlear implant.      Impression:      Impression:  1.Atherosclerotic changes are noted involving the carotid arteries.  2.Dominant left vertebral artery. The right vertebral artery is diminutive in size and may end in a PICA.  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  4.Left cochlear implant.        Electronically Signed: Rc Samano MD    12/27/2024 12:48 PM EST    Workstation ID: [id number]    CT Angiogram Neck [[id number]] Collected: 12/27/24 1234     Updated: 12/27/24 1250    Narrative:      CT ANGIOGRAM HEAD W AI ANALYSIS OF LVO, CT ANGIOGRAM NECK    Date of Exam: 12/27/2024 11:28 AM EST    Indication: Stroke, follow up  Neuro deficit, acute, stroke suspected  Acute Stroke.    Comparison: CTA head July 16, 2024    Technique: CTA of the head and neck was performed after the  uneventful intravenous administration of iodinated contrast. Reconstructed coronal and sagittal images were also obtained. In addition, a 3-D volume rendered image was created for   interpretation. Automated exposure control and iterative reconstruction methods were used.      Findings:  There is hard plaque noted in the area of the carotid bifurcations/proximal internal carotid arteries. Degree of stenosis in the left internal carotid artery is around 40% by NASCET criteria and 36 % on the right by NASCET criteria. The left vertebral   artery is dominant. The right vertebral artery is diminutive in size and may end in a PICA. The basilar artery is grossly unremarkable. There does not appear to be significant abnormality of the middle cerebral arteries or posterior cerebral arteries.   There is lack of an A1 segment on the right which could relate to hypoplastic vessel.    As has been noted there is an aneurysm of the anterior communicating artery. This measures about 1.1 x 0.9 cm previously measuring about 1 x 0.83 cm. The A2 segments do not appear abnormal.    There is streak artifact along the left cerebral hemisphere from cochlear implant.      Impression:      Impression:  1.Atherosclerotic changes are noted involving the carotid arteries.  2.Dominant left vertebral artery. The right vertebral artery is diminutive in size and may end in a PICA.  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  4.Left cochlear implant.        Electronically Signed: Rc Samano MD    12/27/2024 12:48 PM EST    Workstation ID: [id number]    CT Head Without Contrast Stroke Protocol [[id number]] Collected: 12/27/24 1120     Updated: 12/27/24 1127    Narrative:      CT HEAD WO CONTRAST STROKE PROTOCOL    Date of Exam: 12/27/2024 11:16 AM EST    Indication: Neuro deficit, acute, stroke suspected  Neuro deficit, acute, stroke suspected.    Comparison: CT head without and with contrast 1/5/2023. CT angiography  7/6/2023.    Technique: Axial CT images were obtained of the head without contrast administration.  Reconstructed coronal and sagittal images were also obtained. Automated exposure control and iterative construction methods were used.    Scan Time: 12/27/2024 at 1115  Results discussed with emergency room desk staff at 12/27/2024 at 11:25 a.m..      Findings:  Left parietal implant creates extensive streak artifact obscuring several images. The images are also degraded by motion. Extensive deep white matter hypodensities are nonspecific but favored to reflect changes of advanced chronic microvascular disease.   No acute or evolving large territory infarct is identified.    Oval 1.0 x 0.8 cm cm hyperdense focus anterior to the third ventricle corresponds to known anterior communicating artery aneurysm, demonstrated to better advantage on prior CT angiography imaging. It is not thought to be significantly changed. No gross   acute intracranial hemorrhage is identified.    No acute calvarial abnormality is seen. Paranasal sinuses and mastoid air cells are clear. Intracranial carotid artery calcifications are present.      Impression:      Impression:    1. The study is degraded by both motion artifact and streak artifact related to left parietal implant.  2. No acute intracranial findings are seen.  3. 1 cm anterior communicating artery aneurysm appears grossly stable, corresponds to CTA head and neck findings from 7/6/2023.  4.. Advanced chronic microvascular disease.        Electronically Signed: Roxanne Jones MD    12/27/2024 11:25 AM EST    Workstation ID: [id number]    XR Chest 1 View [[id number]] Collected: 12/27/24 1058     Updated: 12/27/24 1103    Narrative:      XR CHEST 1 VW    Date of Exam: 12/27/2024 10:47 AM EST    Indication: weakness cough    Comparison: None available.    Findings:  Prior CABG is noted. No evidence of pneumothorax, pleural effusion or focal airspace disease. Calcified granuloma in the  right lateral upper lobe.      Impression:      Impression:  No acute radiographic abnormality.      Electronically Signed: Dilan Partida MD    12/27/2024 11:01 AM EST    Workstation ID: [id number]              ECHOCARDIOGRAM:    Results for orders placed during the hospital encounter of 12/27/24    Adult Transthoracic Echo Complete W/ Cont if Necessary Per Protocol    Interpretation Summary    Left ventricular ejection fraction appears to be 46 - 50%.    Left ventricular wall thickness is consistent with mild concentric hypertrophy.    Left ventricular diastolic dysfunction is noted.    The right ventricular cavity is mildly dilated.    The left atrial cavity is moderately dilated.    Mild aortic valve stenosis is present.    Estimated right ventricular systolic pressure from tricuspid regurgitation is normal (<35 mmHg).                  Assessment:         Active Hospital Problems    Diagnosis  POA    **Symptomatic bradycardia [R00.1]  Yes     Fatigue  Bradycardia- hold atenolol (home dose 12.5mg daily)  CAD s/p CABG 2001 with LIMA to LAD, SVG to D1, SVG to OM, SVG to PDA  Dyslipidemia  Hyperglycemia  Anemia  Thrombocytopenia  former smoker     Plan:   Patient very [place] but was brought to ER by family as he had been more fatigued, somnolent recently  He is bradycardic with heart rate in the 40s. He is on atenolol 12.5mg daily at home  Hold atenolol  Monitor on telemetry  Consider EP consult Monday for possible PPM vs. Outpt monitor  ECHO ordered               Lucia Howell MD  12/28/24  13:45 EST

## 2024-12-29 NOTE — PROGRESS NOTES
[facility] MEDICINE SERVICE  DAILY PROGRESS NOTE    NAME: Royal Beckman  : 1935  MRN: [id number]      LOS: 2 days     PROVIDER OF SERVICE: Jewels Cordova PA-C    Chief Complaint: Symptomatic bradycardia    Subjective:     Interval History:  History taken from: patient chart RN    Patient is very [place].  Patient had an episode yesterday of decreased responsiveness.        Review of Systems: UTO ROS due to underlying dementia  Review of Systems    Objective:     Vital Signs  Temp:  [97.4 °F (36.3 °C)-98 °F (36.7 °C)] 97.8 °F (36.6 °C)  Heart Rate:  [43-84] 50  Resp:  [11-16] 12  BP: (114-135)/(50-60) 135/53   Body mass index is 30.12 kg/m².    Physical Exam  Physical Exam  Constitutional:       Appearance: Normal appearance.   HENT:      Head: Normocephalic and atraumatic.      Mouth/Throat:      Mouth: Mucous membranes are moist.   Eyes:      Extraocular Movements: Extraocular movements intact.   Cardiovascular:      Rate and Rhythm: Regular rhythm. Bradycardia present.   Pulmonary:      Effort: Pulmonary effort is normal.      Breath sounds: Normal breath sounds.   Abdominal:      General: Abdomen is flat.      Palpations: Abdomen is soft.      Tenderness: There is no abdominal tenderness.   Musculoskeletal:      Cervical back: Normal range of motion.      Right lower leg: No edema.      Left lower leg: No edema.   Skin:     General: Skin is warm.   Neurological:      General: No focal deficit present.      Mental Status: He is alert. He is disoriented.            Diagnostic Data    Results from last 7 days   Lab Units 24  0008 24  0338 24  1042   WBC 10*3/mm3 6.72   < > 5.39   HEMOGLOBIN g/dL 13.1   < > 13.6   HEMATOCRIT % 39.8   < > 40.6   PLATELETS 10*3/mm3 163   < > 163   GLUCOSE mg/dL 91   < > 83   CREATININE mg/dL 0.68*   < > 0.81   BUN mg/dL 12   < > 14   SODIUM mmol/L 141   < > 142   POTASSIUM mmol/L 4.2   < > 3.9   AST (SGOT) U/L  --   --  21   ALT (SGPT) U/L  --   --  8    ALK PHOS U/L  --   --  115   BILIRUBIN mg/dL  --   --  0.8   ANION GAP mmol/L 8.3   < > 9.3    < > = values in this interval not displayed.       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 No acute abnormality of the intracranial arterial vessels. Electronically Signed: Wood Cox DO  12/28/2024 6:30 PM EST  Workstation ID: [id number]    CT Head Without Contrast    Result Date: 12/28/2024  Impression: 1.No acute intracranial finding. 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 3.Chronic changes including involutional changes and chronic small vessel ischemic changes. Electronically Signed: Hardy Golden MD  12/28/2024 5:05 PM EST  Workstation ID: [id number]    CT Angiogram Head w AI Analysis of LVO    Result Date: 12/27/2024  Impression: 1.Atherosclerotic changes are noted involving the carotid arteries. 2.Dominant left vertebral artery. The right vertebral artery is diminutive in size and may end in a PICA. 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 4.Left cochlear implant. Electronically Signed: Rc Samano MD  12/27/2024 12:48 PM EST  Workstation ID: [id number]    CT Angiogram Neck    Result Date: 12/27/2024  Impression: 1.Atherosclerotic changes are noted involving the carotid arteries. 2.Dominant left vertebral artery. The right vertebral artery is diminutive in size and may end in a PICA. 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 4.Left cochlear implant. Electronically Signed: Rc Samano MD  12/27/2024 12:48 PM EST  Workstation ID: [id number]       I reviewed the patient's new clinical results.    Assessment/Plan:     Active and Resolved Problems  Active Hospital Problems    Diagnosis  POA    **Symptomatic bradycardia [R00.1]  Yes      Resolved Hospital Problems   No resolved problems to display.       Symptomatic bradycardia  -Patient noted to have  bradycardia in the 30s and 40s on scene and was given atropine by EMS.  He is on atenolol at home but was reported he has not taken this in a few days.  This medication was held on admission..  He remains bradycardic in the 40s and low 50s.  Difficult to gauge how symptomatic he is given his underlying dementia.  -Given persistent bradycardia, EP consulted.  -TSH wnl  -Troponin mildly elevated 28->31 but he denies any chest pain or other anginal equivalents  -TTE reveals LV EF 46-50%, mild left ventricle concentric hypertrophy, left ventricular diastolic dysfunction, right ventricular cavity is mildly dilated, left atrial cavity is moderately dilated, mild aortic valve stenosis    ? Left-sided weakness and tremors  Anterior communicating artery aneurism   -Code stroke was called while in ED due to report of left-sided weakness, tremor and headache in the setting of underlying brain aneurysm.  On exam patient does not have lateralizing weakness and hx is inconsistent.  -CTH showed stable ACOM.  CTA H/N shows no LVO or flow-limiting stenosis.  Neurology was consulted and recommend MRI brain and continue aspirin. He is already on atorvastatin  -Patient unable to have MRI due to deep brain stimulator.  -Patient had episode yesterday afternoon of decreased responsiveness and bilateral upper extremity tremors.  Repeat CTAs were obtained and unchanged.  EEG pending.  Neurology ordered loading dose of Keppra    Dementia  -Pleasant and cooperative, does not know why he was brought to the ED in the first place  -Continue memantine and donepezil    HTN  -Blood pressure was running slightly low at home per recent PCP office note and amlodipine was decreased to 5mg.  I have changed amlodipine dose to match what he was on at home.  Blood pressure wnl.    VTE Prophylaxis:  Mechanical VTE prophylaxis orders are present.             Disposition Planning:     Barriers to Discharge:cardiology consult  Anticipated Date of Discharge:  12/30  Place of Discharge: [facility], pending PT/OT eval      Time: 40 minutes     Code Status and Medical Interventions: CPR (Attempt to Resuscitate); Full Support   Ordered at: 12/28/24 1249     Level Of Support Discussed With:    Patient     Code Status (Patient has no pulse and is not breathing):    CPR (Attempt to Resuscitate)     Medical Interventions (Patient has pulse or is breathing):    Full Support       Signature: Electronically signed by Jewels Cordova PA-C, 12/29/24, 11:36 EST.  [facility] [place]

## 2024-12-29 NOTE — PROGRESS NOTES
LOS: 2 days     Chief Complaint: Symptomatic bradycardia       SUBJECTIVE:    History taken from: patient chart family RN    Interval History: Royal Beckman was admitted on 12/27/2024 12/29/24: Patient was delirious overnight and removed IV lines and had required frequent reorientation.  This morning the patient remains bradycardic in the 40s, alert and oriented x 3 sitting upright in his bed eating breakfast and watching TV.  Neuro intact with no deficits.  Patient states he feels at baseline but is amnestic to his events yesterday where he was not responsive.  CTA head and neck showed stable ACOM aneurysm with no hemorrhage.  Will still continue with EEG testing on Monday.    12/28/24: 5 by nursing the patient is not following commands and only withdrawing to pain.  Patient was immediately assessed and seem to be resisting opening his eyes, neck was supple with negative Kernig and Brudzinski signs.  On the monitor the patient was bradycardic in the 40s however he was perfusing adequately with a systolic in the 120s.  Pupils pinpoint but reactive, breathing comfortably on room air and intermittently opening his eyes to look at me when I was going to pain him and then would close his eyes when I made eye contact.  Unclear if this is a behavioral disturbance versus metabolic encephalopathy.  Given the patient's ACOM aneurysm we will send him for repeat stat CT and CTA to assess for a rupture.  Spouse and son at bedside report similar presentation yesterday prior to arrival however this seems to be a longer episode.  Additionally he does have some bilateral upper extremity tremors that seem to have worsened according to the family.  He did not have tremors yesterday.  Patient was given a one-time IV levetiracetam load of 1500 mg.    We discussed with the family in the event that there is a aneurysmal rupture, the patient had stated he would not like surgery performed as this was discussed at the last visit  with .     History of present illness: Background per H&P: Royal Beckman is a 89 y.o. 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 Huerta last seen 8/16/2024 and Dr. Seipel last seen 6/11/24. He has not had any growth at his last visit in August of the aneurysm.      He presents today for symptomatic bradycardia in the 40s requiring a dose of atropine and responsive to the 70s with adequate perfusion.      He was in the ED doing well when he had complained of a headache and there was concern for LUE weakness and whole body tremors. A code stroke was called given his h/o an anerysm and there was a concern for hemorrhage.      On my examination the patient denies headache, has whole body tremors/shaking but is able to antigravity all extremities equally well. He had some mild dysarthria due to his jaw jerking, otherwise his exam was unremarkable. Pupils symmetric, he is alert, following commands and has no focal deficits. Neurointact.      CTH had sig artifact from the cochlear implant, however hemorrhage is not grossly evident, but difficult to exclude. Unfortunately he likely cannot get a MRI due to the cochlear implant. The patient states he is shaking because was very cold. Denied N/V, headache or lateralizing weakness. No visual or speech disturbance.         NIHSS 1  GCS 15  /70  - Portions of the above HPI were copied from previous encounters and edited as appropriate.    Patient Complaints: n/a      Review of Systems       Pertinent PMH:  has a past medical history of Anemia, Aneurysm, B12 deficiency, Coronary artery disease, Deep vein thrombosis (10/2001), Hyperglycemia, Hyperlipidemia, Hypertension, Knee swelling (wiyhin the past several months), Low back pain (6 to 8 months ago), Low back strain, Myocardial infarction, Neck strain, and Peripheral neuropathy (don't remember).    ________________________________________________     OBJECTIVE:      GEN: NAD, pleasant, cooperative  CHEST: No signs of resp distress, on room air    NEURO  MENTAL STATUS: AAOx3, memory intact, fund of knowledge appropriate  LANG/SPEECH: Naming and repetition intact, fluent, follows 3-step commands    CRANIAL NERVES:  II-XII grossly intact    MOTOR:  Motor strength 5/5 throughout, symmetric.     REFLEXES: 2/4 throughout    SENSORY:  Normal to touch, temp all limbs  No hemineglect, no extinction to double-sided stimulation (visual & tactile)  COORD: Normal finger to nose        NIH Stroke Scale  Interval: baseline  1a. Level of Consciousness: 0-->Alert, keenly responsive  1b. LOC Questions: 0-->Answers both questions correctly  1c. LOC Commands: 0-->Performs both tasks correctly  2. Best Gaze: 0-->Normal  3. Visual: 0-->No visual loss  4. Facial Palsy: 0-->Normal symmetrical movements  5a. Motor Arm, Left: 0-->No drift, limb holds 90 (or 45) degrees for full 10 secs  5b. Motor Arm, Right: 0-->No drift, limb holds 90 (or 45) degrees for full 10 secs  6a. Motor Leg, Left: 0-->No drift, leg holds 30 degree position for full 5 secs  6b. Motor Leg, Right: 0-->No drift, leg holds 30 degree position for full 5 secs  7. Limb Ataxia: 0-->Absent  8. Sensory: 0-->Normal, no sensory loss  9. Best Language: 0-->No aphasia, normal  10. Dysarthria: 1-->Mild-to-moderate dysarthria, patient slurs at least some words and, at worst, can be understood with some difficulty  11. Extinction and Inattention (formerly Neglect): 0-->No abnormality  Total (NIH Stroke Scale): 1         ________________________________________________   RESULTS REVIEW    VITAL SIGNS:  Temp:  [97.4 °F (36.3 °C)-98 °F (36.7 °C)] 97.8 °F (36.6 °C)  Heart Rate:  [43-84] 50  Resp:  [11-16] 12  BP: (114-135)/(50-60) 135/53    LABS:       Lab 12/29/24  0008 12/28/24  0338 12/27/24  1042   WBC 6.72 8.10 5.39   HEMOGLOBIN 13.1 13.7 13.6   HEMATOCRIT 39.8 41.4 40.6    PLATELETS 163 157 163   NEUTROS ABS 4.44 5.39 3.30   IMMATURE GRANS (ABS) 0.01 0.02 0.01   LYMPHS ABS 1.57 1.82 1.44   MONOS ABS 0.57 0.70 0.49   EOS ABS 0.11 0.14 0.12   MCV 99.0* 99.3* 99.5*   PROTIME  --   --  14.0   APTT  --   --  30.6         Lab 12/29/24  0008 12/28/24  0338 12/27/24  1042   SODIUM 141 142 142   POTASSIUM 4.2 3.5 3.9   CHLORIDE 108* 107 106   CO2 24.7 25.4 26.7   ANION GAP 8.3 9.6 9.3   BUN 12 16 14   CREATININE 0.68* 0.86 0.81   EGFR 90.0 83.8 85.3   GLUCOSE 91 89 83   CALCIUM 8.6 9.1 9.2   MAGNESIUM 2.1 2.1 2.0   PHOSPHORUS 2.6 3.3  --    HEMOGLOBIN A1C  --  5.28  --    TSH  --   --  1.340         Lab 12/27/24  1042   TOTAL PROTEIN 5.9*   ALBUMIN 3.3*   GLOBULIN 2.6   ALT (SGPT) 8   AST (SGOT) 21   BILIRUBIN 0.8   ALK PHOS 115         Lab 12/27/24  1156 12/27/24  1042   PROBNP  --  383.0   HSTROP T 31* 28*   PROTIME  --  14.0   INR  --  1.08             Lab 12/28/24  0338 12/27/24  1150   FOLATE 3.96*  --    VITAMIN B 12 1,560*  --    ABO TYPING  --  A   RH TYPING  --  Positive   ANTIBODY SCREEN  --  Negative             Lab Results   Component Value Date    TSH 1.340 12/27/2024    LDL 71 12/19/2024    HGBA1C 5.28 12/28/2024    [id number] 1,560 (H) 12/28/2024         IMAGING STUDIES:  CT Angiogram Head    Result Date: 12/28/2024  Impression: Unchanged anterior communicating artery aneurysm measuring up to 1.1 cm. No acute abnormality of the intracranial arterial vessels. Electronically Signed: Wood Cox DO  12/28/2024 6:30 PM EST  Workstation ID: [id number]    CT Head Without Contrast    Result Date: 12/28/2024  Impression: 1.No acute intracranial finding. 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 3.Chronic changes including involutional changes and chronic small vessel ischemic changes. Electronically Signed: Hardy Golden MD  12/28/2024 5:05 PM EST  Workstation ID: [id number]    CT Angiogram Head w AI Analysis of LVO    Result Date: 12/27/2024  Impression: 1.Atherosclerotic  changes are noted involving the carotid arteries. 2.Dominant left vertebral artery. The right vertebral artery is diminutive in size and may end in a PICA. 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 4.Left cochlear implant. Electronically Signed: Rc Samano MD  12/27/2024 12:48 PM EST  Workstation ID: [id number]    CT Angiogram Neck    Result Date: 12/27/2024  Impression: 1.Atherosclerotic changes are noted involving the carotid arteries. 2.Dominant left vertebral artery. The right vertebral artery is diminutive in size and may end in a PICA. 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 4.Left cochlear implant. Electronically Signed: Rc Samano MD  12/27/2024 12:48 PM EST  Workstation ID: [id number]    CT Head Without Contrast Stroke Protocol    Result Date: 12/27/2024  Impression: 1. The study is degraded by both motion artifact and streak artifact related to left parietal implant. 2. No acute intracranial findings are seen. 3. 1 cm anterior communicating artery aneurysm appears grossly stable, corresponds to CTA head and neck findings from 7/6/2023. 4.. Advanced chronic microvascular disease. Electronically Signed: Roxanne Jones MD  12/27/2024 11:25 AM EST  Workstation ID: [id number]    XR Chest 1 View    Result Date: 12/27/2024  Impression: No acute radiographic abnormality. Electronically Signed: Dilan Partida MD  12/27/2024 11:01 AM EST  Workstation ID: [id number]     I reviewed the patient's new clinical results.    ________________________________________________      PROBLEM LIST:    Symptomatic bradycardia        ASSESSMENT/PLAN:  Symptomatic bradycardia   H/o anterior communicating artery aneurysm followed with an IR team, Dr. Huerta.  Code stroke called for concern of aneurysmal rupture due to headache.   H/o dementia  Patient symptoms seem inconsistent, he initially told ED staff he had a headache and weakness on his left side with tremors,  however on my examination I did not notice any lateralizing deficits and he did not complain of a headache.  Patient does have a history of dementia and this is a possible behavioral disturbance, however given the negative imaging and his history of aneurysm with left-sided symptoms we will still obtain an MRI brain if able to rule out an acute ischemic event.       AMS, resolved    12/28: Per primary RN, patient had a acute change in mentation over the last hour now unresponsive withdrawing to pain.  Bradycardic with normotension.  Acute altered mental status previously at baseline.  Last known well 1600.  STAT repeat CT head WO and CTA head W to assess for intracerebral hemorrhage and aneurysm structure.  Repeat NCCT and CTA head stable.  NCCT: stable 1 cm ACOM aneurysm compared to scan from 7/6/23. Streak and motion artifact, bu no acute intracranial findings.   CTA H/N: Negative for LVO, flow-limiting stenosis.  Stable appearing ACOM aneurysm measuring 1 x 0.83 cm previously it was measured at 1.1 x 0.9 cm approximately the same.  Unable to obtain MRI brain due to cochlear implant.    Baseline EKG and chest x-ray  Echocardiogram with bubble study  Continuous cardiac telemetry to monitor for arrhythmia  Continue with home dose aspirin 81 mg daily   Stroke labwork: HgbA1C, lipid panel, urine drug screen  Acetaminophen 640 mg PO every 4 hours for temperature >99F  High intensity statin therapy for LDL >70 (to be started prior to discharge)  Tight glucose control (long-term goal HgbA1c < 6%)  Physical/Occupational/Speech therapy: Evaluate and treat  Vital signs per hospital protocol  Neuroassessment per hospital protocol  Please document NIHSS on admission and with any neurochanges  Strict NPO until bedside swallow study, advance diet as appropriate  Oxygen therapy (titrate to keep SpO2 greater than 94%  Activity as tolerated  Stroke education and counseling  Recommend outpatient follow-up with Dr. Huerta (vascular  neuro) and Dr. Seipel (neuro)       2. Bilateral upper extremity tremors, intermittent  - EEG   - Load levetiracetam 1500 mg IV once     3. Dementia  C/w home dose Aricept  C/w home dose memantine    4.  Bradycardia  Cardiology following recommending holding home atenolol  Continue on telemetry  EP consult Monday for possible PPM versus outpatient monitor  2D echocardiogram        **Please refer to previous notes for further details and recommendations.     I discussed the patient's findings and my recommendations with patient, family, and nursing staff    Leonard Marlow MD  12/29/24  10:57 EST

## 2024-12-29 NOTE — THERAPY EVALUATION
Patient Name: Royal Beckman  : 1935    MRN: [id number]                              Today's Date: 2024       Admit Date: 2024    Visit Dx:     ICD-10-CM ICD-9-CM   1. Symptomatic bradycardia  R00.1 427.89     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    Hyperglycemia     Hyperlipidemia     Hypertension     Knee swelling wiyhin the past several months    Low back pain 6 to 8 months ago    Low back strain     Myocardial infarction     Neck strain     Peripheral neuropathy don't remember     Past Surgical History:   Procedure Laterality Date    CARDIAC CATHETERIZATION      COLONOSCOPY      CORONARY ARTERY BYPASS GRAFT        General Information       Row Name 24 1310          Physical Therapy Time and Intention    Document Type evaluation  -CR     Mode of Treatment physical therapy  -CR       Row Name 24 1310          General Information    Patient Profile Reviewed yes  -CR     Prior Level of Function independent:;all household mobility  using rw per patient report. Patient claims independent with dressing, showers  -CR     Existing Precautions/Restrictions cardiac  -CR     Barriers to Rehab cognitive status;medically complex  -CR       Row Name 24 1310          Living Environment    People in Home spouse  -CR       Row Name 24 1310          Home Main Entrance    Number of Stairs, Main Entrance two  -CR     Stair Railings, Main Entrance railings safe and in good condition  -CR       Row Name 24 1310          Cognition    Orientation Status  (Cognition) oriented x 4  -CR       Row Name 12/29/24 1310          Safety Issues/Impairments Affecting Functional Mobility    Safety Issues Affecting Function (Mobility) at risk behavior observed;insight into deficits/self-awareness  -CR     Impairments Affecting Function (Mobility) endurance/activity tolerance;balance  -CR               User Key  (r) = Recorded By, (t) = Taken By, (c) = Cosigned By      Initials Name Provider Type    CR Reyes, Carmela, PT Physical Therapist                   Mobility       Row Name 12/29/24 1317          Bed Mobility    Bed Mobility supine-sit;sit-supine  -CR     Supine-Sit [place] (Bed Mobility) minimum assist (75% patient effort);moderate assist (50% patient effort);verbal cues  -CR     Sit-Supine [place] (Bed Mobility) standby assist  -CR     Assistive Device (Bed Mobility) bed rails;head of bed elevated  -CR       Row Name 12/29/24 1317          Bed-Chair Transfer    Bed-Chair [place] (Transfers) minimum assist (75% patient effort);set up  -CR       Row Name 12/29/24 1317          Sit-Stand Transfer    Sit-Stand [place] (Transfers) minimum assist (75% patient effort)  -CR               User Key  (r) = Recorded By, (t) = Taken By, (c) = Cosigned By      Initials Name Provider Type    CR Reyes, Carmela, PT Physical Therapist                   Obj/Interventions       Row Name 12/29/24 1317          Range of Motion Comprehensive    General Range of Motion no range of motion deficits identified  -CR       Row Name 12/29/24 1317          Strength Comprehensive (MMT)    Comment, General Manual Muscle Testing (MMT) Assessment BLE grossly 3/5  -CR       Row Name 12/29/24 1317          Balance    Balance Assessment sitting static balance;standing static balance;standing dynamic balance  -CR     Static Sitting Balance independent  -CR     Position, Sitting Balance sitting edge of bed  -CR     Static Standing Balance minimal assist  -CR     Dynamic Standing Balance  minimal assist  -CR       Row Name 12/29/24 1317          Sensory Assessment (Somatosensory)    Sensory Assessment (Somatosensory) sensation intact  -CR               User Key  (r) = Recorded By, (t) = Taken By, (c) = Cosigned By      Initials Name Provider Type    CR Reyes, Carmela, PT Physical Therapist                   Goals/Plan       Row Name 12/29/24 1324          Bed Mobility Goal 1 (PT)    Activity/Assistive Device (Bed Mobility Goal 1, PT) supine to sit  -CR     [place] Level/Cues Needed (Bed Mobility Goal 1, PT) standby assist  -CR     Time Frame (Bed Mobility Goal 1, PT) long term goal (LTG);2 weeks  -CR       Row Name 12/29/24 1324          Gait Training Goal 1 (PT)    Activity/Assistive Device (Gait Training Goal 1, PT) gait (walking locomotion);assistive device use;forward stepping  -CR     [place] Level (Gait Training Goal 1, PT) standby assist  -CR     Distance (Gait Training Goal 1, PT) 50  -CR     Time Frame (Gait Training Goal 1, PT) long term goal (LTG);2 weeks  -CR       Row Name 12/29/24 1324          Therapy Assessment/Plan (PT)    Planned Therapy Interventions (PT) balance training;bed mobility training;gait training;home exercise program;patient/family education;transfer training;strengthening;neuromuscular re-education  -CR               User Key  (r) = Recorded By, (t) = Taken By, (c) = Cosigned By      Initials Name Provider Type    CR Reyes, Carmela, PT Physical Therapist                   Clinical Impression       Row Name 12/29/24 1318          Pain    Pretreatment Pain Rating 0/10 - no pain  -CR     Posttreatment Pain Rating 0/10 - no pain  -CR       Row Name 12/29/24 1318          Plan of Care Review    Plan of Care Reviewed With patient  -CR     Outcome Evaluation 89 y/o male admitted on 12/27 due to HR in 40s ; patient apparently did not take his medications. While in ED , code stroke called due to onset of headache and tremors. CT showed no acute intracranial process  despite movement and implant artifact; + stable aneurysm. PMH includes dementia, htn, cochlear implant. Patient reports residing at home with spouse and using rw for mobility. At time of eval, patient needed min/mod A for supine to sit. Min A needed for transfers and noted HR remains in 40s thus returned supine with SBA and gait deferred. Will continue to follow up while in hospital and progress as tolerated.  -CR       Row Name 12/29/24 1318          Therapy Assessment/Plan (PT)    Patient/Family Therapy Goals Statement (PT) home  -CR     Rehab Potential (PT) good  -CR     Criteria for Skilled Interventions Met (PT) yes;skilled treatment is necessary  -CR     Therapy Frequency (PT) 5 times/wk  -CR     Predicted Duration of Therapy Intervention (PT) dc  -CR       Row Name 12/29/24 1318          Positioning and Restraints    Post Treatment Position bed  -CR     In Bed notified nsg;supine;call light within reach;exit alarm on  -CR               User Key  (r) = Recorded By, (t) = Taken By, (c) = Cosigned By      Initials Name Provider Type    CR Reyes, Carmela, PT Physical Therapist                   Outcome Measures       Row Name 12/29/24 1325 12/29/24 1200       How much help from another person do you currently need...    Turning from your back to your side while in flat bed without using bedrails? 4  -CR 4  -YK    Moving from lying on back to sitting on the side of a flat bed without bedrails? 2  -CR 4  -YK    Moving to and from a bed to a chair (including a wheelchair)? 3  -CR 4  -YK    Standing up from a chair using your arms (e.g., wheelchair, bedside chair)? 3  -CR 4  -YK    Climbing 3-5 steps with a railing? 2  -CR 3  -YK    To walk in hospital room? 3  -CR 3  -YK    AM-PAC 6 Clicks Score (PT) 17  -CR 22  -YK              User Key  (r) = Recorded By, (t) = Taken By, (c) = Cosigned By      Initials Name Provider Type    CR Reyes, Carmela, PT Physical Therapist    Mitch Goode, RN Registered Nurse                                  Physical Therapy Education       Title: PT OT SLP Therapies (In Progress)       Topic: Physical Therapy (In Progress)       Point: Mobility training (Done)       Learning Progress Summary            Patient Acceptance, E, VU by CR at 12/29/2024 1326                      Point: Home exercise program (Not Started)       Learner Progress:  Not documented in this visit.              Point: Body mechanics (Not Started)       Learner Progress:  Not documented in this visit.              Point: Precautions (Not Started)       Learner Progress:  Not documented in this visit.                              User Key       Initials Effective Dates Name Provider Type Discipline    CR 06/16/21 -  Reyes, Carmela, PT Physical Therapist PT                  PT Recommendation and Plan  Planned Therapy Interventions (PT): balance training, bed mobility training, gait training, home exercise program, patient/family education, transfer training, strengthening, neuromuscular re-education  Outcome Evaluation: 89 y/o male admitted on 12/27 due to HR in 40s ; patient apparently did not take his medications. While in ED , code stroke called due to onset of headache and tremors. CT showed no acute intracranial process despite movement and implant artifact; + stable aneurysm. PMH includes dementia, htn, cochlear implant. Patient reports residing at home with spouse and using rw for mobility. At time of eval, patient needed min/mod A for supine to sit. Min A needed for transfers and noted HR remains in 40s thus returned supine with SBA and gait deferred. Will continue to follow up while in hospital and progress as tolerated.     Time Calculation:         PT Charges       Row Name 12/29/24 1326             Time Calculation    Start Time 0923  -CR      Stop Time 0950  -CR      Time Calculation (min) 27 min  -CR      PT Received On 12/29/24  -CR      PT - Next Appointment 12/30/24  -CR      PT Goal Re-Cert Due Date 01/12/25   -CR         Time Calculation- PT    Total Timed Code Minutes- PT 0 minute(s)  -CR                User Key  (r) = Recorded By, (t) = Taken By, (c) = Cosigned By      Initials Name Provider Type    CR Reyes, Carmela, SETH Physical Therapist                  Therapy Charges for Today       Code Description Service Date Service Provider Modifiers Qty    39967457603 HC PT EVAL MOD COMPLEXITY 4 12/29/2024 Reyes, Carmela, SETH GP 1            PT G-Codes  AM-PAC 6 Clicks Score (PT): 17  PT Discharge Summary  Anticipated Discharge Disposition (PT): home with 24/7 care, home with home health    Carmela Reyes, PT  12/29/2024

## 2024-12-29 NOTE — PLAN OF CARE
Problem: Adult Inpatient Plan of Care  Goal: Plan of Care Review  Flowsheets (Taken 12/29/2024 9757)  Progress: no change  Plan of Care Reviewed With: patient   Goal Outcome Evaluation:  Plan of Care Reviewed With: patient        Progress: no change

## 2024-12-30 ENCOUNTER — APPOINTMENT (OUTPATIENT)
Dept: NEUROLOGY | Facility: HOSPITAL | Age: 89
End: 2024-12-30
Payer: MEDICARE

## 2024-12-30 ENCOUNTER — TELEPHONE (OUTPATIENT)
Dept: CASE MANAGEMENT | Facility: CLINIC | Age: 89
End: 2024-12-30
Payer: MEDICARE

## 2024-12-30 LAB
ANION GAP SERPL CALCULATED.3IONS-SCNC: 7.8 MMOL/L (ref 5–15)
BASOPHILS # BLD AUTO: 0.01 10*3/MM3 (ref 0–0.2)
BASOPHILS NFR BLD AUTO: 0.2 % (ref 0–1.5)
BUN SERPL-MCNC: 11 MG/DL (ref 8–23)
BUN/CREAT SERPL: 12.9 (ref 7–25)
CALCIUM SPEC-SCNC: 9 MG/DL (ref 8.6–10.5)
CHLORIDE SERPL-SCNC: 109 MMOL/L (ref 98–107)
CO2 SERPL-SCNC: 25.2 MMOL/L (ref 22–29)
CREAT SERPL-MCNC: 0.85 MG/DL (ref 0.76–1.27)
DEPRECATED RDW RBC AUTO: 51.4 FL (ref 37–54)
EGFRCR SERPLBLD CKD-EPI 2021: 84.1 ML/MIN/1.73
EOSINOPHIL # BLD AUTO: 0.13 10*3/MM3 (ref 0–0.4)
EOSINOPHIL NFR BLD AUTO: 2.1 % (ref 0.3–6.2)
ERYTHROCYTE [DISTWIDTH] IN BLOOD BY AUTOMATED COUNT: 14.3 % (ref 12.3–15.4)
GLUCOSE SERPL-MCNC: 83 MG/DL (ref 65–99)
HCT VFR BLD AUTO: 40.1 % (ref 37.5–51)
HGB BLD-MCNC: 13.5 G/DL (ref 13–17.7)
IMM GRANULOCYTES # BLD AUTO: 0.01 10*3/MM3 (ref 0–0.05)
IMM GRANULOCYTES NFR BLD AUTO: 0.2 % (ref 0–0.5)
LYMPHOCYTES # BLD AUTO: 1.66 10*3/MM3 (ref 0.7–3.1)
LYMPHOCYTES NFR BLD AUTO: 27.3 % (ref 19.6–45.3)
MAGNESIUM SERPL-MCNC: 2.3 MG/DL (ref 1.6–2.4)
MCH RBC QN AUTO: 33.1 PG (ref 26.6–33)
MCHC RBC AUTO-ENTMCNC: 33.7 G/DL (ref 31.5–35.7)
MCV RBC AUTO: 98.3 FL (ref 79–97)
MONOCYTES # BLD AUTO: 0.59 10*3/MM3 (ref 0.1–0.9)
MONOCYTES NFR BLD AUTO: 9.7 % (ref 5–12)
NEUTROPHILS NFR BLD AUTO: 3.68 10*3/MM3 (ref 1.7–7)
NEUTROPHILS NFR BLD AUTO: 60.5 % (ref 42.7–76)
NRBC BLD AUTO-RTO: 0 /100 WBC (ref 0–0.2)
PHOSPHATE SERPL-MCNC: 3.2 MG/DL (ref 2.5–4.5)
PLATELET # BLD AUTO: 178 10*3/MM3 (ref 140–450)
PMV BLD AUTO: 10.3 FL (ref 6–12)
POTASSIUM SERPL-SCNC: 4.6 MMOL/L (ref 3.5–5.2)
RBC # BLD AUTO: 4.08 10*6/MM3 (ref 4.14–5.8)
SODIUM SERPL-SCNC: 142 MMOL/L (ref 136–145)
WBC NRBC COR # BLD AUTO: 6.08 10*3/MM3 (ref 3.4–10.8)

## 2024-12-30 PROCEDURE — 97166 OT EVAL MOD COMPLEX 45 MIN: CPT

## 2024-12-30 PROCEDURE — 95819 EEG AWAKE AND ASLEEP: CPT

## 2024-12-30 PROCEDURE — 80048 BASIC METABOLIC PNL TOTAL CA: CPT

## 2024-12-30 PROCEDURE — 83735 ASSAY OF MAGNESIUM: CPT

## 2024-12-30 PROCEDURE — 99232 SBSQ HOSP IP/OBS MODERATE 35: CPT | Performed by: INTERNAL MEDICINE

## 2024-12-30 PROCEDURE — 95819 EEG AWAKE AND ASLEEP: CPT | Performed by: PSYCHIATRY & NEUROLOGY

## 2024-12-30 PROCEDURE — 85025 COMPLETE CBC W/AUTO DIFF WBC: CPT

## 2024-12-30 PROCEDURE — 84100 ASSAY OF PHOSPHORUS: CPT

## 2024-12-30 PROCEDURE — 99232 SBSQ HOSP IP/OBS MODERATE 35: CPT | Performed by: PSYCHIATRY & NEUROLOGY

## 2024-12-30 RX ORDER — LISINOPRIL 5 MG/1
10 TABLET ORAL
Status: DISCONTINUED | OUTPATIENT
Start: 2024-12-30 | End: 2024-12-31 | Stop reason: HOSPADM

## 2024-12-30 RX ADMIN — SERTRALINE HYDROCHLORIDE 50 MG: 50 TABLET, FILM COATED ORAL at 08:09

## 2024-12-30 RX ADMIN — Medication 1000 MCG: at 08:09

## 2024-12-30 RX ADMIN — Medication 5000 UNITS: at 08:09

## 2024-12-30 RX ADMIN — MEMANTINE 10 MG: 10 TABLET ORAL at 20:19

## 2024-12-30 RX ADMIN — Medication 10 ML: at 20:19

## 2024-12-30 RX ADMIN — Medication 10 ML: at 08:10

## 2024-12-30 RX ADMIN — MEMANTINE 10 MG: 10 TABLET ORAL at 08:09

## 2024-12-30 RX ADMIN — ASPIRIN 81 MG: 81 TABLET, COATED ORAL at 08:10

## 2024-12-30 RX ADMIN — DONEPEZIL HYDROCHLORIDE 10 MG: 5 TABLET, FILM COATED ORAL at 08:09

## 2024-12-30 RX ADMIN — LISINOPRIL 10 MG: 5 TABLET ORAL at 18:11

## 2024-12-30 RX ADMIN — DONEPEZIL HYDROCHLORIDE 10 MG: 5 TABLET, FILM COATED ORAL at 20:19

## 2024-12-30 RX ADMIN — ATORVASTATIN CALCIUM 20 MG: 20 TABLET, FILM COATED ORAL at 08:09

## 2024-12-30 RX ADMIN — AMLODIPINE BESYLATE 5 MG: 5 TABLET ORAL at 08:09

## 2024-12-30 NOTE — CASE MANAGEMENT/SOCIAL WORK
Continued Stay Note  DIOR Martínez     Patient Name: Royal Beckman  MRN: [id number]  Today's Date: 12/30/2024    Admit Date: 12/27/2024    Plan: DC PLAN: From routine home with sponse. [facility] following.       Discharge Plan       Row Name 12/30/24 1628       Plan    Plan DC PLAN: From routine home with sponse. [facility] following.    Patient/Family in Agreement with Plan yes    Plan Comments EEG results pending, Awaiting neuro to sign off. Bradycardic. Anticipate discharge later today or in the morning.                      Expected Discharge Date and Time       Expected Discharge Date Expected Discharge Time    Dec 30, 2024           Ana Maria Virgen RN    Case Management  [phone]

## 2024-12-30 NOTE — PROGRESS NOTES
CARDIOLOGY PROGRESS NOTE:    Royal Beckman  89 y.o.  male  1935  [id number]      Referring Provider: Hospitalist    Reason for follow-up: Bradycardia     Patient Care Team:  Edin Mckeon APRN as PCP - General (Family Medicine)  Sanjiv Bae MD as Consulting Physician (Cardiology)  Kelly Maravilla, CEM as Ambulatory  ([facility])    Subjective .  Patient does not have any symptoms of chest pain or shortness of breath or dizziness    Objective lying in bed comfortably     Review of Systems   Constitutional: Negative for malaise/fatigue.   Cardiovascular:  Negative for chest pain, dyspnea on exertion, leg swelling and palpitations.   Respiratory:  Negative for cough and shortness of breath.    Gastrointestinal:  Negative for abdominal pain, nausea and vomiting.   Neurological:  Negative for dizziness, focal weakness, headaches, light-headedness and numbness.   All other systems reviewed and are negative.      Allergies: Patient has no known allergies.    Scheduled Meds:amLODIPine, 5 mg, Oral, Daily  aspirin, 81 mg, Oral, Daily  atorvastatin, 20 mg, Oral, Daily  donepezil, 10 mg, Oral, BID  memantine, 10 mg, Oral, Q12H  sertraline, 50 mg, Oral, Daily  sodium chloride, 10 mL, Intravenous, Q12H  vitamin B-12, 1,000 mcg, Oral, Daily  vitamin D3, 5,000 Units, Oral, Daily      Continuous Infusions:   PRN Meds:.  acetaminophen **OR** acetaminophen **OR** acetaminophen    senna-docusate sodium **AND** polyethylene glycol **AND** bisacodyl **AND** bisacodyl    Calcium Replacement - Follow Nurse / BPA Driven Protocol    Magnesium Standard Dose Replacement - Follow Nurse / BPA Driven Protocol    nitroglycerin    Phosphorus Replacement - Follow Nurse / BPA Driven Protocol    Potassium Replacement - Follow Nurse / BPA Driven Protocol    [COMPLETED] Insert Peripheral IV **AND** sodium chloride    sodium chloride    sodium chloride        VITAL SIGNS  Vitals:    12/29/24 2013 12/30/24  "0015 12/30/24 0435 12/30/24 0810   BP: 135/56 123/69 130/66 111/60   BP Location: Right arm Right arm Right arm Right arm   Patient Position: Lying Lying Lying    Pulse: 50 50 50 64   Resp: 16 13 12 16   Temp: 97.6 °F (36.4 °C) 97.5 °F (36.4 °C) 97.3 °F (36.3 °C) 98.6 °F (37 °C)   TempSrc: Oral Oral Oral Infrared   SpO2: 96%   97%   Weight:       Height:           Flowsheet Rows      Flowsheet Row First Filed Value   Admission Height 165.1 cm (65\") Documented at 12/27/2024 1028   Admission Weight 83.7 kg (184 lb 8.4 oz) Documented at 12/27/2024 1028             TELEMETRY: Sinus bradycardia    Physical Exam:  Constitutional:       Appearance: Well-developed.   Eyes:      General: No scleral icterus.     Conjunctiva/sclera: Conjunctivae normal.   HENT:      Head: Normocephalic and atraumatic.   Neck:      Vascular: No carotid bruit or JVD.   Pulmonary:      Effort: Pulmonary effort is normal.      Breath sounds: Normal breath sounds. No wheezing. No rales.   Cardiovascular:      Normal rate. Regular rhythm.   Pulses:     Intact distal pulses.   Abdominal:      General: Bowel sounds are normal.      Palpations: Abdomen is soft.   Musculoskeletal:      Cervical back: Normal range of motion and neck supple. Skin:     General: Skin is warm and dry.      Findings: No rash.   Neurological:      Mental Status: Alert.          Results Review:   I reviewed the patient's new clinical results.  Lab Results (last 24 hours)       Procedure Component Value Units Date/Time    Basic Metabolic Panel [[id number]]  (Abnormal) Collected: 12/30/24 0401    Specimen: Blood from Arm, Left Updated: 12/30/24 0448     Glucose 83 mg/dL      BUN 11 mg/dL      Creatinine 0.85 mg/dL      Sodium 142 mmol/L      Potassium 4.6 mmol/L      Chloride 109 mmol/L      CO2 25.2 mmol/L      Calcium 9.0 mg/dL      BUN/Creatinine Ratio 12.9     Anion Gap 7.8 mmol/L      eGFR 84.1 mL/min/1.73     Narrative:      GFR Categories in Chronic Kidney Disease " (CKD)      GFR Category          GFR (mL/min/1.73)    Interpretation  G1                     90 or greater         Normal or high (1)  G2                      60-89                Mild decrease (1)  G3a                   45-59                Mild to moderate decrease  G3b                   30-44                Moderate to severe decrease  G4                    15-29                Severe decrease  G5                    14 or less           Kidney failure          (1)In the absence of evidence of kidney disease, neither GFR category G1 or G2 fulfill the criteria for CKD.    eGFR calculation 2021 CKD-EPI creatinine equation, which does not include race as a factor    Magnesium [[id number]]  (Normal) Collected: 12/30/24 0401    Specimen: Blood from Arm, Left Updated: 12/30/24 0448     Magnesium 2.3 mg/dL     Phosphorus [[id number]]  (Normal) Collected: 12/30/24 0401    Specimen: Blood from Arm, Left Updated: 12/30/24 0448     Phosphorus 3.2 mg/dL     CBC & Differential [[id number]]  (Abnormal) Collected: 12/30/24 0401    Specimen: Blood from Arm, Left Updated: 12/30/24 0422    Narrative:      The following orders were created for panel order CBC & Differential.  Procedure                               Abnormality         Status                     ---------                               -----------         ------                     CBC Auto Differential[[id number]]        Abnormal            Final result                 Please view results for these tests on the individual orders.    CBC Auto Differential [[id number]]  (Abnormal) Collected: 12/30/24 0401    Specimen: Blood from Arm, Left Updated: 12/30/24 0422     WBC 6.08 10*3/mm3      RBC 4.08 10*6/mm3      Hemoglobin 13.5 g/dL      Hematocrit 40.1 %      MCV 98.3 fL      MCH 33.1 pg      MCHC 33.7 g/dL      RDW 14.3 %      RDW-SD 51.4 fl      MPV 10.3 fL      Platelets 178 10*3/mm3      Neutrophil % 60.5 %      Lymphocyte % 27.3 %      Monocyte % 9.7 %      Eosinophil %  2.1 %      Basophil % 0.2 %      Immature Grans % 0.2 %      Neutrophils, Absolute 3.68 10*3/mm3      Lymphocytes, Absolute 1.66 10*3/mm3      Monocytes, Absolute 0.59 10*3/mm3      Eosinophils, Absolute 0.13 10*3/mm3      Basophils, Absolute 0.01 10*3/mm3      Immature Grans, Absolute 0.01 10*3/mm3      nRBC 0.0 /100 WBC             Imaging Results (Last 24 Hours)       ** No results found for the last 24 hours. **            EKG      I personally viewed and interpreted the patient's EKG/Telemetry data:    ECHOCARDIOGRAM:  Results for orders placed during the hospital encounter of 12/27/24    Adult Transthoracic Echo Complete W/ Cont if Necessary Per Protocol    Interpretation Summary    Left ventricular ejection fraction appears to be 46 - 50%.    Left ventricular wall thickness is consistent with mild concentric hypertrophy.    Left ventricular diastolic dysfunction is noted.    The right ventricular cavity is mildly dilated.    The left atrial cavity is moderately dilated.    Mild aortic valve stenosis is present.    Estimated right ventricular systolic pressure from tricuspid regurgitation is normal (<35 mmHg).       STRESS MYOVIEW:       CARDIAC CATHETERIZATION:  No results found for this or any previous visit.       OTHER:         Assessment & Plan     Sinus bradycardia  Patient presented with fatigue and dizziness and had sinus bradycardia but he was on beta-blockers which have been stopped since his admission  Will watch him for the next 24 hours to see if the heart rates improved  Patient should also have a Holter monitor to assess for any significant pauses  We also discussed with him about the possibility of pacemaker placement.    Coronary disease  Patient had coronary artery bypass surgery x 4 vessels in the past with a LIMA to LAD and SVG to the marginal branch diagonal branch and RCA and is not having any angina  Patient had an echocardiogram which showed mild LV dysfunction with an EF of 45 to  50%.    HFmEF  Patient has medium range LV dysfunction with an EF of 45 to 50% but cannot take beta-blockers because of bradycardia and if his blood pressure permits we will place him on low-dose ACE inhibitors since his creatinine is stable  Patient is on amlodipine which will be stopped.    Hypertension  Patient's blood pressure is being monitored without beta-blockers and will start him on low-dose ACE inhibitors.    Hyperlipidemia  Patient is on statins and the lipid levels are well within normal limits    I discussed the patients findings and my recommendations with patient and nurse    Padilla Barney MD  12/30/24  11:50 EST

## 2024-12-30 NOTE — PROCEDURES
This is an inpatient,   Digitally recorded multi-montage EEG with leads placed according to the international 10/20 system  Photic stimulation was not done  Hyperventilation was not done    With the patient fully aroused, the background was 8.5 to 9.5 Hz posterior dominant alpha rhythm which was symmetric and attenuated with eyes opening    The patient did become drowsy and later on stage II sleep was seen    Mostly asleep with spindles  No asymmetry    Nothing suggesting clear-cut epileptiform activity or asymmetry  No clinical events      Impression:  This essentially normal adult awake, drowsy and asleep EEG  No seizures but EEG like this does not rule out epilepsy

## 2024-12-30 NOTE — PLAN OF CARE
Goal Outcome Evaluation:              Outcome Evaluation: Pt is a 89 y.o. year old male admitted 12/27/24 with bradycardia. Presented with HA and tremors in ED. CTH (-) acute changes.    Pmhx significant for dementa, HTN, Cochlear implant.     At baseline, pt resides with spouse and uses RW for mobility. Pt oriented x2. Very [place], so additional cognitive assessment limited. Pt completes bed mobility and functional transfers with Min A. He requires Mod A for LB ADLs, but appears largely limited with initiation due to cognition impairment / unfamiliar environment and difficulty communicating due to hearing loss. No family present at this time, but appears patient is near baseline. If family is able to provide 24 hour assist, he would benefit from return to familiar environment with [facility] services.  However, if this is not available patient will require SNF at [place].    Anticipated Discharge Disposition (OT): home with home health, home with 24/7 care

## 2024-12-30 NOTE — PLAN OF CARE
Problem: Wound  Goal: Absence of Infection Signs and Symptoms  Intervention: Prevent or Manage Infection  Recent Flowsheet Documentation  Taken 12/30/2024 0400 by Bon Flaherty RN  Isolation Precautions: precautions maintained  Taken 12/30/2024 0000 by Bon Flaherty RN  Isolation Precautions: precautions maintained  Taken 12/29/2024 2000 by Bon Flaherty RN  Isolation Precautions: precautions maintained  Goal: Skin Health and Integrity  Intervention: Optimize Skin Protection  Recent Flowsheet Documentation  Taken 12/30/2024 0400 by Bon Flaherty RN  Pressure Reduction Techniques: frequent weight shift encouraged  Head of Bed (HOB) Positioning: HOB elevated  Pressure Reduction Devices: pressure-redistributing mattress utilized  Taken 12/30/2024 0000 by Bon Flaherty RN  Pressure Reduction Techniques: frequent weight shift encouraged  Pressure Reduction Devices: pressure-redistributing mattress utilized  Taken 12/29/2024 2000 by Bon Flaherty RN  Pressure Reduction Techniques: frequent weight shift encouraged  Head of Bed (HOB) Positioning: HOB elevated  Pressure Reduction Devices: pressure-redistributing mattress utilized     Problem: Adult Inpatient Plan of Care  Goal: Absence of Hospital-Acquired Illness or Injury  Intervention: Identify and Manage Fall Risk  Recent Flowsheet Documentation  Taken 12/30/2024 0400 by Bon Flaherty RN  Safety Promotion/Fall Prevention:   safety round/check completed   room organization consistent   nonskid shoes/slippers when out of bed   fall prevention program maintained   clutter free environment maintained   assistive device/personal items within reach  Taken 12/30/2024 0200 by Bon Flaherty RN  Safety Promotion/Fall Prevention:   safety round/check completed   room organization consistent   nonskid shoes/slippers when out of bed   fall prevention program maintained   clutter free environment maintained   assistive device/personal items within  reach  Taken 12/30/2024 0000 by Bon Flaherty RN  Safety Promotion/Fall Prevention:   safety round/check completed   room organization consistent   nonskid shoes/slippers when out of bed   fall prevention program maintained   assistive device/personal items within reach   clutter free environment maintained  Taken 12/29/2024 2200 by Bon Flaherty RN  Safety Promotion/Fall Prevention:   safety round/check completed   room organization consistent   nonskid shoes/slippers when out of bed   fall prevention program maintained   clutter free environment maintained   assistive device/personal items within reach  Taken 12/29/2024 2000 by Bon Flaherty RN  Safety Promotion/Fall Prevention:   safety round/check completed   room organization consistent   nonskid shoes/slippers when out of bed   fall prevention program maintained   clutter free environment maintained   assistive device/personal items within reach  Intervention: Prevent Skin Injury  Recent Flowsheet Documentation  Taken 12/30/2024 0400 by Bon Flaherty RN  Body Position: position changed independently  Skin Protection: incontinence pads utilized  Taken 12/30/2024 0000 by Bon Flaherty RN  Skin Protection: incontinence pads utilized  Taken 12/29/2024 2000 by Bon Flaherty RN  Body Position: turned  Skin Protection: incontinence pads utilized  Intervention: Prevent Infection  Recent Flowsheet Documentation  Taken 12/30/2024 0400 by Bon Flaherty RN  Infection Prevention:   environmental surveillance performed   hand hygiene promoted   personal protective equipment utilized   rest/sleep promoted   single patient room provided  Taken 12/30/2024 0000 by Bon Flaherty RN  Infection Prevention: environmental surveillance performed  Taken 12/29/2024 2000 by Bon Flaherty RN  Infection Prevention:   environmental surveillance performed   hand hygiene promoted   personal protective equipment utilized   rest/sleep promoted   single  patient room provided     Problem: Skin Injury Risk Increased  Goal: Skin Health and Integrity  Intervention: Optimize Skin Protection  Recent Flowsheet Documentation  Taken 12/30/2024 0400 by Bon Flaherty RN  Pressure Reduction Techniques: frequent weight shift encouraged  Head of Bed (HOB) Positioning: [facility] elevated  Pressure Reduction Devices: pressure-redistributing mattress utilized  Skin Protection: incontinence pads utilized  Taken 12/30/2024 0000 by Bon Flaherty RN  Pressure Reduction Techniques: frequent weight shift encouraged  Pressure Reduction Devices: pressure-redistributing mattress utilized  Skin Protection: incontinence pads utilized  Taken 12/29/2024 2000 by Bon Flaherty RN  Pressure Reduction Techniques: frequent weight shift encouraged  Head of Bed (HOB) Positioning: [facility] elevated  Pressure Reduction Devices: pressure-redistributing mattress utilized  Skin Protection: incontinence pads utilized     Problem: Fall Injury Risk  Goal: Absence of Fall and Fall-Related Injury  Intervention: Promote Injury-Free Environment  Recent Flowsheet Documentation  Taken 12/30/2024 0400 by Bno Flaherty RN  Safety Promotion/Fall Prevention:   safety round/check completed   room organization consistent   nonskid shoes/slippers when out of bed   fall prevention program maintained   clutter free environment maintained   assistive device/personal items within reach  Taken 12/30/2024 0200 by Bon Flaherty RN  Safety Promotion/Fall Prevention:   safety round/check completed   room organization consistent   nonskid shoes/slippers when out of bed   fall prevention program maintained   clutter free environment maintained   assistive device/personal items within reach  Taken 12/30/2024 0000 by Bon Flaherty RN  Safety Promotion/Fall Prevention:   safety round/check completed   room organization consistent   nonskid shoes/slippers when out of bed   fall prevention program maintained   assistive  device/personal items within reach   clutter free environment maintained  Taken 12/29/2024 2200 by Bon Flaherty, RN  Safety Promotion/Fall Prevention:   safety round/check completed   room organization consistent   nonskid shoes/slippers when out of bed   fall prevention program maintained   clutter free environment maintained   assistive device/personal items within reach  Taken 12/29/2024 2000 by Bon Flaherty, RN  Safety Promotion/Fall Prevention:   safety round/check completed   room organization consistent   nonskid shoes/slippers when out of bed   fall prevention program maintained   clutter free environment maintained   assistive device/personal items within reach   Goal Outcome Evaluation:      Patient has been calm and cooperative with care. Patient slept between care.

## 2024-12-30 NOTE — PROGRESS NOTES
LOS: 3 days     Chief Complaint: Confusion       SUBJECTIVE:    History taken from: patient chart RN    Vital signs stable, still some bradycardia    Labs reviewed and nothing major      His EEG was essentially normal    He is awake and alert and oriented and interacts very well    Nothing focal    Patient was seen by Dr. Marlow,  Interval History: Royal Beckman was admitted on 12/27/2024 12/29/24: Patient was delirious overnight and removed IV lines and had required frequent reorientation.  This morning the patient remains bradycardic in the 40s, alert and oriented x 3 sitting upright in his bed eating breakfast and watching TV.  Neuro intact with no deficits.  Patient states he feels at baseline but is amnestic to his events yesterday where he was not responsive.  CTA head and neck showed stable ACOM aneurysm with no hemorrhage.  Will still continue with EEG testing on Monday.     12/28/24: 5 by nursing the patient is not following commands and only withdrawing to pain.  Patient was immediately assessed and seem to be resisting opening his eyes, neck was supple with negative Kernig and Brudzinski signs.  On the monitor the patient was bradycardic in the 40s however he was perfusing adequately with a systolic in the 120s.  Pupils pinpoint but reactive, breathing comfortably on room air and intermittently opening his eyes to look at me when I was going to pain him and then would close his eyes when I made eye contact.  Unclear if this is a behavioral disturbance versus metabolic encephalopathy.  Given the patient's ACOM aneurysm we will send him for repeat stat CT and CTA to assess for a rupture.  Spouse and son at bedside report similar presentation yesterday prior to arrival however this seems to be a longer episode.  Additionally he does have some bilateral upper extremity tremors that seem to have worsened according to the family.  He did not have tremors yesterday.  Patient was given a one-time IV  levetiracetam load of 1500 mg.     We discussed with the family in the event that there is a aneurysmal rupture, the patient had stated he would not like surgery performed as this was discussed at the last visit with .      History of present illness: Background per H&P: Royal Beckman is a 89 y.o. 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 Huerta last seen 8/16/2024 and Dr. Seipel last seen 6/11/24. He has not had any growth at his last visit in August of the aneurysm.      He presents today for symptomatic bradycardia in the 40s requiring a dose of atropine and responsive to the 70s with adequate perfusion.      He was in the ED doing well when he had complained of a headache and there was concern for LUE weakness and whole body tremors. A code stroke was called given his h/o an anerysm and there was a concern for hemorrhage.      On my examination the patient denies headache, has whole body tremors/shaking but is able to antigravity all extremities equally well. He had some mild dysarthria due to his jaw jerking, otherwise his exam was unremarkable. Pupils symmetric, he is alert, following commands and has no focal deficits. Neurointact.      CTH had sig artifact from the cochlear implant, however hemorrhage is not grossly evident, but difficult to exclude. Unfortunately he likely cannot get a MRI due to the cochlear implant. The patient states he is shaking because was very cold. Denied N/V, headache or lateralizing weakness. No visual or speech disturbance.         NIHSS 1  GCS 15  /70  - Portions of the above HPI were copied from previous encounters and edited as appropriate.           - Portions of the above HPI were copied from previous encounters and edited as appropriate.    Patient Complaints: Essentially none      Review of Systems   No fever chills rigors or sweats  No weight issues  No sleep  problems  HEENT:  No speech problem, vision changes, facial asymmetry or pain, or neck problem but he has significant hearing problem  Chest: No chest pain, clubbing, cyanosis, orthopnea palpitations, but has bradycardia, history of coronary artery disease  Pulmonary:  No shortness of air, cough or expectoration  Abdomen:  No swelling/tension, constipation,diarrhea or pain  No genitourinary symptoms  Extremity problems as discussed  Chronic low back pain  No psychotic issues  Neurologic issues as discussed  No hematologic, dermatologic or endocrine problems, history of anemia, history of DVT        Pertinent PMH:  has a past medical history of Anemia, Aneurysm, B12 deficiency, Coronary artery disease, Deep vein thrombosis (10/2001), Hyperglycemia, Hyperlipidemia, Hypertension, Knee swelling (wiyhin the past several months), Low back pain (6 to 8 months ago), Low back strain, Myocardial infarction, Neck strain, and Peripheral neuropathy (don't remember).   ________________________________________________     OBJECTIVE:  This gentleman is resting comfortably in bed in no acute distress      The patient is awake and alert and oriented x3  Normal speech  Cranial nerve examination demonstrate:  Full fields of vision to confrontation  Pupils are round, reactive to light and accommodation and size of about 3 mm  No ptosis or nystagmus  Funduscopic examination was not successful  Eye movements are conjugate     Sensation on the face and scalp are normal  Muscles of mastication are normal and symmetric  Muscles of  facial expression are normal and symmetric  Very very hard of hearing confusion and possible syncope  Head turning and shoulder shrugs were unremarkable  Tongue was midline  I could not visualize  oropharynx or uvula     Motor examination:  Normal bulk, tone and strength was 5/5  No fasciculations     Sensory examination:  Intact for soft touch, pain   Romberg was not evaluated     Reflexes:  0/4      Coordination:  Normal finger-to-nose to finger, rapid alternating movements and toe to finger     Gait:  Deferred     Toe signs:  [place]      NIH Stroke Scale  Interval: baseline  1a. Level of Consciousness: 0-->Alert, keenly responsive  1b. LOC Questions: 0-->Answers both questions correctly  1c. LOC Commands: 0-->Performs both tasks correctly  2. Best Gaze: 0-->Normal  3. Visual: 0-->No visual loss  4. Facial Palsy: 0-->Normal symmetrical movements  5a. Motor Arm, Left: 0-->No drift, limb holds 90 (or 45) degrees for full 10 secs  5b. Motor Arm, Right: 0-->No drift, limb holds 90 (or 45) degrees for full 10 secs  6a. Motor Leg, Left: 0-->No drift, leg holds 30 degree position for full 5 secs  6b. Motor Leg, Right: 0-->No drift, leg holds 30 degree position for full 5 secs  7. Limb Ataxia: 0-->Absent  8. Sensory: 0-->Normal, no sensory loss  9. Best Language: 0-->No aphasia, normal  10. Dysarthria: 1-->Mild-to-moderate dysarthria, patient slurs at least some words and, at worst, can be understood with some difficulty  11. Extinction and Inattention (formerly Neglect): 0-->No abnormality  Total (NIH Stroke Scale): 1         ________________________________________________   RESULTS REVIEW    VITAL SIGNS:  Temp:  [97.2 °F (36.2 °C)-98.6 °F (37 °C)] 98.6 °F (37 °C)  Heart Rate:  [47-64] 64  Resp:  [12-16] 16  BP: (108-135)/(48-69) 111/60    LABS:       Lab 12/30/24  0401 12/29/24  0008 12/28/24  0338 12/27/24  1042   WBC 6.08 6.72 8.10 5.39   HEMOGLOBIN 13.5 13.1 13.7 13.6   HEMATOCRIT 40.1 39.8 41.4 40.6   PLATELETS 178 163 157 163   NEUTROS ABS 3.68 4.44 5.39 3.30   IMMATURE GRANS (ABS) 0.01 0.01 0.02 0.01   LYMPHS ABS 1.66 1.57 1.82 1.44   MONOS ABS 0.59 0.57 0.70 0.49   EOS ABS 0.13 0.11 0.14 0.12   MCV 98.3* 99.0* 99.3* 99.5*   PROTIME  --   --   --  14.0   APTT  --   --   --  30.6         Lab 12/30/24  0401 12/29/24  0008 12/28/24  0338 12/27/24  1042   SODIUM 142 141 142 142   POTASSIUM 4.6 4.2 3.5 3.9    CHLORIDE 109* 108* 107 106   CO2 25.2 24.7 25.4 26.7   ANION GAP 7.8 8.3 9.6 9.3   BUN 11 12 16 14   CREATININE 0.85 0.68* 0.86 0.81   EGFR 84.1 90.0 83.8 85.3   GLUCOSE 83 91 89 83   CALCIUM 9.0 8.6 9.1 9.2   MAGNESIUM 2.3 2.1 2.1 2.0   PHOSPHORUS 3.2 2.6 3.3  --    HEMOGLOBIN A1C  --   --  5.28  --    TSH  --   --   --  1.340         Lab 12/27/24  1042   TOTAL PROTEIN 5.9*   ALBUMIN 3.3*   GLOBULIN 2.6   ALT (SGPT) 8   AST (SGOT) 21   BILIRUBIN 0.8   ALK PHOS 115         Lab 12/27/24  1156 12/27/24  1042   PROBNP  --  383.0   HSTROP T 31* 28*   PROTIME  --  14.0   INR  --  1.08             Lab 12/28/24  0338 12/27/24  1150   FOLATE 3.96*  --    VITAMIN B 12 1,560*  --    ABO TYPING  --  A   RH TYPING  --  Positive   ANTIBODY SCREEN  --  Negative             Lab Results   Component Value Date    TSH 1.340 12/27/2024    LDL 71 12/19/2024    HGBA1C 5.28 12/28/2024    [id number] 1,560 (H) 12/28/2024         IMAGING STUDIES:  CT Angiogram Head    Result Date: 12/28/2024  Impression: Unchanged anterior communicating artery aneurysm measuring up to 1.1 cm. No acute abnormality of the intracranial arterial vessels. Electronically Signed: Wood Cox DO  12/28/2024 6:30 PM EST  Workstation ID: [id number]    CT Head Without Contrast    Result Date: 12/28/2024  Impression: 1.No acute intracranial finding. 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 3.Chronic changes including involutional changes and chronic small vessel ischemic changes. Electronically Signed: Hardy Golden MD  12/28/2024 5:05 PM EST  Workstation ID: [id number]     I reviewed the patient's new clinical results.    ________________________________________________      PROBLEM LIST:    Symptomatic bradycardia        ASSESSMENT/PLAN:  Confusion and possible bradycardic syncope    Nothing suggesting epilepsy    Neurologic workup unremarkable   per neurology will observe and call if is needed        Epilepsy not established on no AEDs  yet        **Please refer to previous notes for further details and recommendations.     I discussed the patient's findings and my recommendations with patient and nursing staff    Jamie Lane MD  12/30/24  10:37 EST

## 2024-12-30 NOTE — THERAPY EVALUATION
Patient Name: Royal Beckman  : 1935    MRN: [id number]                              Today's Date: 2024       Admit Date: 2024    Visit Dx:     ICD-10-CM ICD-9-CM   1. Symptomatic bradycardia  R00.1 427.89     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    Hyperglycemia     Hyperlipidemia     Hypertension     Knee swelling wiyhin the past several months    Low back pain 6 to 8 months ago    Low back strain     Myocardial infarction     Neck strain     Peripheral neuropathy don't remember     Past Surgical History:   Procedure Laterality Date    CARDIAC CATHETERIZATION      COLONOSCOPY      CORONARY ARTERY BYPASS GRAFT        General Information       Row Name 24 1451          OT Time and Intention    Document Type evaluation  -ES     Mode of Treatment occupational therapy  -ES     Patient Effort good  -ES       Row Name 24 1451          General Information    Patient Profile Reviewed yes  -ES     Existing Precautions/Restrictions cardiac  -ES     Barriers to Rehab medically complex;cognitive status  -ES       Row Name 24 1451          Occupational Profile    Reason for Services/Referral (Occupational Profile) Pt is a 89 y.o. year old male admitted 24 with bradycardia. Presented with HA and tremors in ED. CTH (-) acute changes.    Pmhx significant for dementa, HTN, Cochlear implant.     At baseline, pt resides with spouse and uses RW for mobility.  -ES       Row Name 24 1451          Living Environment    People in Home spouse  -ES       Row  Name 12/30/24 1451          Home Main Entrance    Number of Stairs, Main Entrance two  -ES       Row Name 12/30/24 1451          Cognition    Orientation Status (Cognition) oriented x 4  -ES       Row Name 12/30/24 1451          Safety Issues/Impairments Affecting Functional Mobility    Safety Issues Affecting Function (Mobility) ability to follow commands;awareness of need for assistance;safety precaution awareness;safety precautions follow-through/compliance;insight into deficits/self-awareness  -ES     Impairments Affecting Function (Mobility) endurance/activity tolerance;balance  -ES               User Key  (r) = Recorded By, (t) = Taken By, (c) = Cosigned By      Initials Name Provider Type    ES Kanchan Velasquez OT Occupational Therapist                     Mobility/ADL's       Row Name 12/30/24 1454          Bed Mobility    Bed Mobility supine-sit;sit-supine  -ES     Supine-Sit [place] (Bed Mobility) moderate assist (50% patient effort)  -ES     Sit-Supine [place] (Bed Mobility) minimum assist (75% patient effort)  -ES     Assistive Device (Bed Mobility) bed rails;head of bed elevated  -ES       Row Name 12/30/24 1454          Bed-Chair Transfer    Bed-Chair [place] (Transfers) minimum assist (75% patient effort)  -ES       Row Name 12/30/24 1454          Sit-Stand Transfer    Sit-Stand [place] (Transfers) minimum assist (75% patient effort)  -ES       Row Name 12/30/24 1454          Activities of Daily Living    BADL Assessment/Intervention upper body dressing;toileting;lower body dressing  -ES       Row Name 12/30/24 1454          Upper Body Dressing Assessment/Training    [place] Level (Upper Body Dressing) set up  -ES       Row Name 12/30/24 1454          Toileting Assessment/Training    [place] Level (Toileting) moderate assist (50% patient effort)  -ES       Row Name 12/30/24 1454          Lower Body Dressing Assessment/Training    [place] Level (Lower Body  Dressing) minimum assist (75% patient effort)  -ES               User Key  (r) = Recorded By, (t) = Taken By, (c) = Cosigned By      Initials Name Provider Type    Kanchan Glynn OT Occupational Therapist                   Obj/Interventions       Row Name 12/30/24 1506          Sensory Assessment (Somatosensory)    Sensory Assessment (Somatosensory) sensation intact  -ES       Row Name 12/30/24 1506          Vision Assessment/Intervention    Visual Impairment/Limitations corrective lenses full-time  -ES       Row Name 12/30/24 1506          Range of Motion Comprehensive    Comment, General Range of Motion LUE shoulder mild impairment- pt states chronic  -ES       Row Name 12/30/24 1506          Strength Comprehensive (MMT)    Comment, General Manual Muscle Testing (MMT) Assessment Grossly WFL  -ES       Row Name 12/30/24 1506          Balance    Balance Assessment sitting dynamic balance;sitting static balance;standing static balance  -ES     Static Sitting Balance independent  -ES     Dynamic Sitting Balance supervision  -ES     Static Standing Balance minimal assist  -ES     Balance Interventions sitting;standing;static;dynamic  -ES               User Key  (r) = Recorded By, (t) = Taken By, (c) = Cosigned By      Initials Name Provider Type    Kanchan Glynn OT Occupational Therapist                   Goals/Plan       Row Name 12/30/24 1513          Bathing Goal 1 (OT)    Activity/Device (Bathing Goal 1, OT) bathing skills, all  -ES     [place] Level/Cues Needed (Bathing Goal 1, OT) minimum assist (75% or more patient effort)  -ES     Time Frame (Bathing Goal 1, OT) 2 weeks  -ES       Row Name 12/30/24 1513          Dressing Goal 1 (OT)    Activity/Device (Dressing Goal 1, OT) dressing skills, all  -ES     [place]/Cues Needed (Dressing Goal 1, OT) modified independence  -ES     Time Frame (Dressing Goal 1, OT) 2 weeks  -ES       Row Name 12/30/24 1513          Toileting Goal 1 (OT)     Activity/Device (Toileting Goal 1, OT) toileting skills, all  -ES     [place] Level/Cues Needed (Toileting Goal 1, OT) standby assist  -ES     Time Frame (Toileting Goal 1, OT) 2 weeks  -ES       Row Name 12/30/24 6711          Therapy Assessment/Plan (OT)    Planned Therapy Interventions (OT) activity tolerance training;BADL retraining;neuromuscular control/coordination retraining;occupation/activity based interventions;strengthening exercise;ROM/therapeutic exercise  -ES               User Key  (r) = Recorded By, (t) = Taken By, (c) = Cosigned By      Initials Name Provider Type    ES Kanchan Velasquez, OT Occupational Therapist                   Clinical Impression       Row Name 12/30/24 4246          Pain Assessment    Pretreatment Pain Rating 0/10 - no pain  -ES     Posttreatment Pain Rating 0/10 - no pain  -ES       Row Name 12/30/24 1502          Plan of Care Review    Outcome Evaluation Pt is a 89 y.o. year old male admitted 12/27/24 with bradycardia. Presented with HA and tremors in ED. CTH (-) acute changes.    Pmhx significant for dementa, HTN, Cochlear implant.     At baseline, pt resides with spouse and uses RW for mobility. Pt oriented x2. Very [place], so additional cognitive assessment limited. Pt completes bed mobility and functional transfers with Min A. He requires Mod A for LB ADLs, but appears largely limited with initiation due to cognition impairment / unfamiliar environment and difficulty communicating due to hearing loss. No family present at this time, but appears patient is near baseline. If family is able to provide 24 hour assist, he would benefit from return to familiar environment with [facility] services.  However, if this is not available patient will require SNF at [place].  -ES       Row Name 12/30/24 3055          Therapy Assessment/Plan (OT)    Rehab Potential (OT) good  -ES     Criteria for Skilled Therapeutic Interventions Met (OT) yes;skilled treatment is necessary  -ES     Therapy  Frequency (OT) 3 times/wk  -ES     Predicted Duration of Therapy Intervention (OT) until dc  -ES       Row Name 12/30/24 1507          Therapy Plan Review/Discharge Plan (OT)    Anticipated Discharge Disposition (OT) home with home health;home with 24/7 care  -ES       Row Name 12/30/24 1507          Vital Signs    Pretreatment Heart Rate (beats/min) 48  -ES     Intratreatment Heart Rate (beats/min) 53  -ES     Posttreatment Heart Rate (beats/min) 50  -ES     O2 Delivery Pre Treatment room air  -ES     O2 Delivery Intra Treatment room air  -ES     O2 Delivery Post Treatment room air  -ES     Pre Patient Position Supine  -ES     Intra Patient Position Standing  -ES     Post Patient Position Sitting  -ES       Row Name 12/30/24 1507          Positioning and Restraints    Pre-Treatment Position in bed  -ES     Post Treatment Position bed  -ES     In Bed notified nsg;call light within reach;encouraged to call for assist;exit alarm on  -ES               User Key  (r) = Recorded By, (t) = Taken By, (c) = Cosigned By      Initials Name Provider Type    Kanchan Glynn OT Occupational Therapist                   Outcome Measures       Row Name 12/30/24 1527          How much help from another is currently needed...    Putting on and taking off regular lower body clothing? 2  -ES     Bathing (including washing, rinsing, and drying) 2  -ES     Toileting (which includes using toilet bed pan or urinal) 2  -ES     Putting on and taking off regular upper body clothing 4  -ES     Taking care of personal grooming (such as brushing teeth) 3  -ES     Eating meals 4  -ES     AM-PAC 6 Clicks Score (OT) 17  -ES       Row Name 12/30/24 0816          How much help from another person do you currently need...    Turning from your back to your side while in flat bed without using bedrails? 4  -SS     Moving from lying on back to sitting on the side of a flat bed without bedrails? 3  -SS     Moving to and from a bed to a chair  (including a wheelchair)? 3  -SS     Standing up from a chair using your arms (e.g., wheelchair, bedside chair)? 3  -SS     Climbing 3-5 steps with a railing? 2  -SS     To walk in hospital room? 3  -SS     AM-PAC 6 Clicks Score (PT) 18  -SS       Row Name 12/30/24 1527          Functional Assessment    Outcome Measure Options AM-PAC 6 Clicks Daily Activity (OT)  -ES               User Key  (r) = Recorded By, (t) = Taken By, (c) = Cosigned By      Initials Name Provider Type    Kanchan Glynn OT Occupational Therapist    SS Sujatha Mayer LPN Licensed Nurse                    Occupational Therapy Education        No education to display                  OT Recommendation and Plan  Planned Therapy Interventions (OT): activity tolerance training, BADL retraining, neuromuscular control/coordination retraining, occupation/activity based interventions, strengthening exercise, ROM/therapeutic exercise  Therapy Frequency (OT): 3 times/wk  Plan of Care Review  Outcome Evaluation: Pt is a 89 y.o. year old male admitted 12/27/24 with bradycardia. Presented with HA and tremors in ED. CTH (-) acute changes.    Pmhx significant for dementa, HTN, Cochlear implant.     At baseline, pt resides with spouse and uses RW for mobility. Pt oriented x2. Very [place], so additional cognitive assessment limited. Pt completes bed mobility and functional transfers with Min A. He requires Mod A for LB ADLs, but appears largely limited with initiation due to cognition impairment / unfamiliar environment and difficulty communicating due to hearing loss. No family present at this time, but appears patient is near baseline. If family is able to provide 24 hour assist, he would benefit from return to familiar environment with [facility] services.  However, if this is not available patient will require SNF at [place].     Time Calculation:         Time Calculation- OT       Row Name 12/30/24 1527             Time Calculation- OT    OT Start Time 1134   -ES      OT Stop Time 1150  -ES      OT Time Calculation (min) 18 min  -ES      Total Timed Code Minutes- OT 0 minute(s)  -ES      OT Received On 12/30/24  -ES      OT - Next Appointment 01/02/25  -ES      OT Goal Re-Cert Due Date 01/13/25  -ES                User Key  (r) = Recorded By, (t) = Taken By, (c) = Cosigned By      Initials Name Provider Type     Kanchan Velasquez OT Occupational Therapist                  Therapy Charges for Today       Code Description Service Date Service Provider Modifiers Qty    [id number]  OT EVAL MOD COMPLEXITY 3 12/30/2024 Kanchan Velasquez OT GO 1                 Kanchan Velasquez OT  12/30/2024

## 2024-12-30 NOTE — PROGRESS NOTES
[facility]   PROGRESS NOTE      Referring Provider: Mahendra Vázquez MD    Reason for follow-up: symptomatic bradycardia     Patient Care Team:  Edin Mckeon APRN as PCP - General (Family Medicine)  Sanjiv Bae MD as Consulting Physician (Cardiology)  Kelly Maravilla, CEM as Ambulatory  (Population Health)      SUBJECTIVE    Patient doing okay today. Poor historian.     Review of Systems   Unable to perform ROS: Dementia       Allergies:  Patient has no known allergies.    Personal History:    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    Hyperglycemia     Hyperlipidemia     Hypertension     Knee swelling wiyhin the past several months    Low back pain 6 to 8 months ago    Low back strain     Myocardial infarction     Neck strain     Peripheral neuropathy don't remember       Past Surgical History:   Procedure Laterality Date    CARDIAC CATHETERIZATION      COLONOSCOPY      CORONARY ARTERY BYPASS GRAFT         Family History   Problem Relation Age of Onset    Aneurysm Father     Heart disease Brother         he passe away several tears ago       Social History     Tobacco Use    Smoking status: Former     Current packs/day: 0.00     Average packs/day: 1.5 packs/day for 5.2 years (7.8 ttl pk-yrs)     Types: Cigarettes     Start date: 10/10/1964     Quit date: 12/15/1969     Years since quittin.0     Passive exposure: Past    Smokeless tobacco: Never   Vaping Use    Vaping status: Never Used   Substance Use Topics    Alcohol use: Yes     Comment: occasional, only    Drug use: No        Medications Prior to Admission   Medication Sig Dispense Refill Last Dose/Taking    amLODIPine (NORVASC) 10 MG tablet TAKE ONE TABLET BY MOUTH EVERY DAY 90 tablet 2 Past Week    aspirin 81 MG tablet Take 1 tablet by mouth Daily.   Past Week     "atenolol (TENORMIN) 25 MG tablet TAKE 1/2 TABLET BY MOUTH EVERY DAY 45 tablet 3 Past Week    atorvastatin (LIPITOR) 20 MG tablet Take 1 tablet by mouth Daily. 90 tablet 3 Past Week    Cyanocobalamin (B-12) 1000 MCG capsule Take 1 capsule by mouth Daily.   Past Week    donepezil (Aricept) 10 MG tablet Take 1 tablet by mouth 2 (Two) Times a Day. 180 tablet 3 Past Week    memantine (NAMENDA) 10 MG tablet Take 1 tablet by mouth 2 (Two) Times a Day.   Past Week    sertraline (Zoloft) 50 MG tablet Take 1 tablet by mouth Daily. 30 tablet 11 Past Week    vitamin D3 125 MCG (5000 UT) capsule capsule Take 1 capsule by mouth Daily.   Past Week         OBJECTIVE    VITAL SIGNS  Vitals:    12/29/24 2013 12/30/24 0015 12/30/24 0435 12/30/24 0810   BP: 135/56 123/69 130/66 111/60   BP Location: Right arm Right arm Right arm Right arm   Patient Position: Lying Lying Lying    Pulse: 50 50 50 64   Resp: 16 13 12 16   Temp: 97.6 °F (36.4 °C) 97.5 °F (36.4 °C) 97.3 °F (36.3 °C) 98.6 °F (37 °C)   TempSrc: Oral Oral Oral Infrared   SpO2: 96%   97%   Weight:       Height:         Flowsheet Rows      Flowsheet Row First Filed Value   Admission Height 165.1 cm (65\") Documented at 12/27/2024 1028   Admission Weight 83.7 kg (184 lb 8.4 oz) Documented at 12/27/2024 1028             TELEMETRY: sinus rhythm    Physical Exam  VITALS REVIEWED    General:      well developed, in no acute distress.    Head:      normocephalic and atraumatic.    Eyes:      PERRL/EOM intact, conjunctiva and sclera clear with out nystagmus.    Neck:      no masses, thyromegaly,  trachea central with normal respiratory effort and PMI displaced laterally  Lungs:      Clear to auscultation bilaterally  Heart:       Regular rhythm  Msk:      no deformity or scoliosis noted of thoracic or lumbar spine.    Pulses:      pulses normal in all 4 extremities.    Extremities:       No lower extremity edema    Neurologic:      no focal deficits.   Disoriented  Skin:      intact " without lesions or rashes.    Psych:      alert and cooperative; normal mood and affect; normal attention span and concentration.      LAB RESULTS (LAST 7 DAYS)  I have reviewed new clinical results.    CMP   Results from last 7 days   Lab Units 12/30/24  0401 12/29/24  0008 12/28/24  0338 12/27/24  1042   SODIUM mmol/L 142 141 142 142   POTASSIUM mmol/L 4.6 4.2 3.5 3.9   CHLORIDE mmol/L 109* 108* 107 106   CO2 mmol/L 25.2 24.7 25.4 26.7   BUN mg/dL 11 12 16 14   CREATININE mg/dL 0.85 0.68* 0.86 0.81   GLUCOSE mg/dL 83 91 89 83   ALBUMIN g/dL  --   --   --  3.3*   BILIRUBIN mg/dL  --   --   --  0.8   ALK PHOS U/L  --   --   --  115   AST (SGOT) U/L  --   --   --  21   ALT (SGPT) U/L  --   --   --  8     CBC  Results from last 7 days   Lab Units 12/30/24  0401 12/29/24  0008 12/28/24 0338 12/27/24  1042   WBC 10*3/mm3 6.08 6.72 8.10 5.39   RBC 10*6/mm3 4.08* 4.02* 4.17 4.08*   HEMOGLOBIN g/dL 13.5 13.1 13.7 13.6   HEMATOCRIT % 40.1 39.8 41.4 40.6   MCV fL 98.3* 99.0* 99.3* 99.5*   PLATELETS 10*3/mm3 178 163 157 163     ProBNP      TROPONIN  Results from last 7 days   Lab Units 12/27/24  1156   HSTROP T ng/L 31*     CoAg  Results from last 7 days   Lab Units 12/27/24  1042   INR  1.08   APTT seconds 30.6     Creatinine Clearance  Estimated Creatinine Clearance: 60.4 mL/min (by C-G formula based on SCr of 0.85 mg/dL).    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 No acute abnormality of the intracranial arterial vessels. Electronically Signed: Wood Cox DO  12/28/2024 6:30 PM EST  Workstation ID: [id number]    CT Head Without Contrast    Result Date: 12/28/2024  Impression: 1.No acute intracranial finding. 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 3.Chronic changes including involutional changes and chronic small vessel ischemic changes. Electronically Signed: aHrdy Golden MD  12/28/2024 5:05 PM EST   Workstation ID: [id number]     EKG    I personally viewed and interpreted the patient's EKG/Telemetry data:  ECG 12 Lead Other; JAW DISCOMFORT   Final Result   HEART RATE=51  bpm   RR Claexwbq=9502  ms   IN Phmabylj=624  ms   P Horizontal Axis=  deg   P Front Axis=1  deg   QRSD Lfupxsrv=314  ms   QT Ihdvllfz=736  ms   QEgT=749  ms   QRS Axis=77  deg   T Wave Axis=71  deg   - ABNORMAL ECG -   Sinus bradycardia   Nonspecific  intraventricular conduction delay   Probable  anteroseptal infarct, old   When compared with ECG of 27-Dec-2024 12:00:39,   No significant change   Electronically Signed By: Lucia Howell ([facility]) 2024-12-29 18:43:54   Date and Time of Study:2024-12-28 18:40:25      ECG 12 Lead Stroke Evaluation   Final Result   HEART RATE=48  bpm   RR Tdqgiiqr=0205  ms   IN Lwuaxllz=004  ms   P Horizontal Axis=-8  deg   P Front Axis=12  deg   QRSD Vfkdmvwq=787  ms   QT Paaouuqd=015  ms   JBuB=750  ms   QRS Axis=-20  deg   T Wave Axis=Invalid  deg   - ABNORMAL ECG -   Sinus bradycardia   Left bundle branch block   When compared with ECG of 27-Dec-2024 11:06:40,   No significant change   Electronically Signed By: Daniel Kramer ([facility]) 2024-12-28 06:33:53   Date and Time of Study:2024-12-27 12:00:39      ECG 12 Lead Rhythm Change   Final Result   HEART RATE=63  bpm   RR Kdgtxmow=635  ms   IN Ntqfbqbx=743  ms   P Horizontal Axis=30  deg   P Front Axis=81  deg   QRSD Sgobvdyi=291  ms   QT Xdodskkq=800  ms   YZkL=985  ms   QRS Axis=-28  deg   T Wave Axis=60  deg   - ABNORMAL ECG -   Sinus rhythm   Left bundle branch block   Electronically Signed By: Daniel Kramer ([facility]) 2024-12-28 06:34:43   Date and Time of Study:2024-12-27 11:06:40      ECG 12 Lead Rhythm Change   Final Result   HEART RATE=67  bpm   RR Itleibka=212  ms   IN Zbhwxotr=862  ms   P Horizontal Axis=55  deg   P Front Axis=45  deg   QRSD Plnmslgr=140  ms   QT Boieygji=482  ms   NXnN=045  ms   QRS Axis=-31  deg   T Wave Axis=63  deg   - ABNORMAL ECG -    Sinus rhythm   Left bundle branch block   No previous ECG available for comparison   Electronically Signed By: Daniel Kramer ([facility]) 2024-12-28 06:34:26   Date and Time of Study:2024-12-27 10:35:32      ECG 12 Lead Bradycardia    (Results Pending)       ECHOCARDIOGRAM:  Results for orders placed during the hospital encounter of 12/27/24    Adult Transthoracic Echo Complete W/ Cont if Necessary Per Protocol    Interpretation Summary    Left ventricular ejection fraction appears to be 46 - 50%.    Left ventricular wall thickness is consistent with mild concentric hypertrophy.    Left ventricular diastolic dysfunction is noted.    The right ventricular cavity is mildly dilated.    The left atrial cavity is moderately dilated.    Mild aortic valve stenosis is present.    Estimated right ventricular systolic pressure from tricuspid regurgitation is normal (<35 mmHg).      STRESS MYOVIEW:      CARDIAC CATHETERIZATION:  No results found for this or any previous visit.      OTHER:     Pertinent cardiac workup:  EKG 12/28/24, sinus bradycardia, rate 51bpm  EKG 12/27/24, sinus rhythm, rate 67 bpm, LBBB  EKG 4/8/24. Sinus rhythm, rate 60 bpm, moderate IVC delay  Echo 12/28/24, EF 46-50%, LVDD noted, LA mod dilated, mild AS      ASSESSMENT/PLAN      Symptomatic bradycardia  Dementia  CAD    PLAN    Bradycardia  -beta blocker stopped  -not on any AV node delaying agents  -HR as low as mid 40s per nursing  -Left bundle branch block  -TSH within normal limits    Dementia  -AO x 2 (self, occasionally time)  -continue current medications    CAD, NSTEMI  -CABG x4  -on statin  -stable    I discussed the patients findings and my recommendations with patient and nurse.  Further recommendations and assessment per Dr. Haynes.    PIERRE Miller  12/30/24  09:14 EST  Electronically signed by PIERRE Miller, 12/30/24, 9:25 AM EST.

## 2024-12-30 NOTE — TELEPHONE ENCOUNTER
ACM notes missed call and Voicemail from Dalia at [facility].  She states she had reached out to the wife and scheduled patient for an evaluation, then the wife requested that the evaluation be pushed out until after the new year.      ACM notes that patient is currently in the hospital due to not taking his medications.  Per CR, CM note states that D/C plan is home with [facility] and that [facility] will schedule to evaluate once patient is discharged home.  ACM will continue to follow.

## 2024-12-30 NOTE — PROGRESS NOTES
[facility] MEDICINE SERVICE  DAILY PROGRESS NOTE    NAME: Royal Beckman  : 1935  MRN: [id number]      LOS: 3 days     PROVIDER OF SERVICE: Mahendra Vázquez MD    Chief Complaint: Symptomatic bradycardia    Subjective:     Interval History: Patient doing well, no complaints.        Review of Systems: UTO ROS due to underlying dementia  Review of Systems    Objective:     Vital Signs  Temp:  [97.2 °F (36.2 °C)-98.6 °F (37 °C)] 98.6 °F (37 °C)  Heart Rate:  [47-64] 64  Resp:  [12-16] 16  BP: (111-135)/(56-69) 111/60   Body mass index is 30.12 kg/m².    Physical Exam  Physical Exam  Constitutional:       Appearance: Normal appearance.   HENT:      Head: Normocephalic and atraumatic.      Mouth/Throat:      Mouth: Mucous membranes are moist.   Eyes:      Extraocular Movements: Extraocular movements intact.   Cardiovascular:      Rate and Rhythm: Regular rhythm. Bradycardia present.   Pulmonary:      Effort: Pulmonary effort is normal.      Breath sounds: Normal breath sounds.   Abdominal:      General: Abdomen is flat.      Palpations: Abdomen is soft.      Tenderness: There is no abdominal tenderness.   Musculoskeletal:      Cervical back: Normal range of motion.      Right lower leg: No edema.      Left lower leg: No edema.   Skin:     General: Skin is warm.   Neurological:      General: No focal deficit present.      Mental Status: He is alert. He is disoriented.            Diagnostic Data    Results from last 7 days   Lab Units 24  0401 24  0338 24  1042   WBC 10*3/mm3 6.08   < > 5.39   HEMOGLOBIN g/dL 13.5   < > 13.6   HEMATOCRIT % 40.1   < > 40.6   PLATELETS 10*3/mm3 178   < > 163   GLUCOSE mg/dL 83   < > 83   CREATININE mg/dL 0.85   < > 0.81   BUN mg/dL 11   < > 14   SODIUM mmol/L 142   < > 142   POTASSIUM mmol/L 4.6   < > 3.9   AST (SGOT) U/L  --   --  21   ALT (SGPT) U/L  --   --  8   ALK PHOS U/L  --   --  115   BILIRUBIN mg/dL  --   --  0.8   ANION GAP mmol/L 7.8   < > 9.3     < > = values in this interval not displayed.       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 No acute abnormality of the intracranial arterial vessels. Electronically Signed: Wood JacielnaldoDO charley  12/28/2024 6:30 PM EST  Workstation ID: [id number]    CT Head Without Contrast    Result Date: 12/28/2024  Impression: 1.No acute intracranial finding. 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 3.Chronic changes including involutional changes and chronic small vessel ischemic changes. Electronically Signed: Hardy Golden MD  12/28/2024 5:05 PM EST  Workstation ID: [id number]       I reviewed the patient's new clinical results.    Assessment/Plan:     Active and Resolved Problems  Active Hospital Problems    Diagnosis  POA    **Symptomatic bradycardia [R00.1]  Yes      Resolved Hospital Problems   No resolved problems to display.       Symptomatic bradycardia  -Patient presented with bradycardia on admission and was given atropine prior to arrival  -He was on atenolol at home and this was discontinued on admission here  -Given persistent bradycardia, EP consulted  -TSH wnl  -Troponin mildly elevated 28->31 but he denies any chest pain or other anginal equivalents  -TTE reveals LV EF 46-50%, mild left ventricle concentric hypertrophy, left ventricular diastolic dysfunction, right ventricular cavity is mildly dilated, left atrial cavity is moderately dilated, mild aortic valve stenosis  -Heart rate has improved and is now in the high 50s, low 60s although does not increase much with physical activity    Possible Left-sided weakness and tremors  Anterior communicating artery aneurism   -Code stroke was called while in ED due to report of left-sided weakness, tremor and headache in the setting of underlying brain aneurysm  -On exam patient does not have lateralizing weakness and hx is inconsistent  -CTH showed stable ACOM.  CTA H/N shows  no LVO or flow-limiting stenosis.  Neurology was consulted and recommend MRI brain and continue aspirin. He is already on atorvastatin  -Patient unable to have MRI due to deep brain stimulator  -Patient had episode 12/28 afternoon of decreased responsiveness and bilateral upper extremity tremors.  Repeat CTAs were obtained and unchanged  -EEG on 12/30 does not show any evidence of epileptiform discharges    Dementia  -Pleasant and cooperative, does not know why he was brought to the ED in the first place  -Continue memantine and donepezil    HTN  -Blood pressure was running slightly low at home per recent PCP office note and amlodipine was decreased to 5mg.  I have changed amlodipine dose to match what he was on at home.  Blood pressure wnl.    VTE Prophylaxis:  Mechanical VTE prophylaxis orders are present.             Disposition Planning:     Barriers to Discharge:cardiology consult  Anticipated Date of Discharge: 12/31  Place of Discharge: Home      Time: 40 minutes     Code Status and Medical Interventions: CPR (Attempt to Resuscitate); Full Support   Ordered at: 12/28/24 1249     Level Of Support Discussed With:    Patient     Code Status (Patient has no pulse and is not breathing):    CPR (Attempt to Resuscitate)     Medical Interventions (Patient has pulse or is breathing):    Full Support       Signature: Electronically signed by Mahendra Vázquez MD, 12/30/24, 14:13 EST.  [facility] Hospitalist Team

## 2024-12-31 VITALS
SYSTOLIC BLOOD PRESSURE: 101 MMHG | OXYGEN SATURATION: 96 % | WEIGHT: 181 LBS | RESPIRATION RATE: 21 BRPM | DIASTOLIC BLOOD PRESSURE: 60 MMHG | HEIGHT: 65 IN | HEART RATE: 69 BPM | BODY MASS INDEX: 30.16 KG/M2 | TEMPERATURE: 98.3 F

## 2024-12-31 PROCEDURE — 97116 GAIT TRAINING THERAPY: CPT

## 2024-12-31 PROCEDURE — 97110 THERAPEUTIC EXERCISES: CPT

## 2024-12-31 PROCEDURE — 99232 SBSQ HOSP IP/OBS MODERATE 35: CPT

## 2024-12-31 RX ORDER — LISINOPRIL 5 MG/1
5 TABLET ORAL
Qty: 90 TABLET | Refills: 0 | Status: SHIPPED | OUTPATIENT
Start: 2025-01-01

## 2024-12-31 RX ADMIN — ATORVASTATIN CALCIUM 20 MG: 20 TABLET, FILM COATED ORAL at 08:05

## 2024-12-31 RX ADMIN — Medication 1000 MCG: at 08:05

## 2024-12-31 RX ADMIN — DONEPEZIL HYDROCHLORIDE 10 MG: 5 TABLET, FILM COATED ORAL at 08:05

## 2024-12-31 RX ADMIN — MEMANTINE 10 MG: 10 TABLET ORAL at 08:05

## 2024-12-31 RX ADMIN — Medication 5000 UNITS: at 08:05

## 2024-12-31 RX ADMIN — ASPIRIN 81 MG: 81 TABLET, COATED ORAL at 08:05

## 2024-12-31 RX ADMIN — SERTRALINE HYDROCHLORIDE 50 MG: 50 TABLET, FILM COATED ORAL at 08:05

## 2024-12-31 NOTE — PLAN OF CARE
Problem: Wound  Goal: Absence of Infection Signs and Symptoms  Intervention: Prevent or Manage Infection  Recent Flowsheet Documentation  Taken 12/31/2024 0400 by Bon Flaherty RN  Isolation Precautions: precautions maintained  Taken 12/31/2024 0000 by Bon Flaherty RN  Isolation Precautions: precautions maintained  Taken 12/30/2024 2000 by Bon Flaherty RN  Isolation Precautions: precautions maintained  Goal: Skin Health and Integrity  Intervention: Optimize Skin Protection  Recent Flowsheet Documentation  Taken 12/31/2024 0400 by Bon Flaherty RN  Pressure Reduction Techniques: frequent weight shift encouraged  Pressure Reduction Devices: pressure-redistributing mattress utilized  Taken 12/31/2024 0000 by Bon Flaherty RN  Pressure Reduction Techniques: frequent weight shift encouraged  Head of Bed (HOB) Positioning: HOB elevated  Pressure Reduction Devices: pressure-redistributing mattress utilized  Taken 12/30/2024 2000 by Bon Flaherty RN  Pressure Reduction Techniques: frequent weight shift encouraged  Head of Bed (HOB) Positioning: HOB elevated  Pressure Reduction Devices: pressure-redistributing mattress utilized     Problem: Adult Inpatient Plan of Care  Goal: Absence of Hospital-Acquired Illness or Injury  Intervention: Identify and Manage Fall Risk  Recent Flowsheet Documentation  Taken 12/31/2024 0400 by Bon Flaherty RN  Safety Promotion/Fall Prevention:   safety round/check completed   room organization consistent   nonskid shoes/slippers when out of bed   fall prevention program maintained   clutter free environment maintained   assistive device/personal items within reach  Taken 12/31/2024 0200 by Bon Flaherty RN  Safety Promotion/Fall Prevention:   safety round/check completed   room organization consistent   nonskid shoes/slippers when out of bed   fall prevention program maintained   clutter free environment maintained   assistive device/personal items within  reach  Taken 12/31/2024 0000 by Bon Flaherty RN  Safety Promotion/Fall Prevention:   safety round/check completed   room organization consistent   nonskid shoes/slippers when out of bed   fall prevention program maintained   clutter free environment maintained   assistive device/personal items within reach  Taken 12/30/2024 2200 by Bon Flaherty RN  Safety Promotion/Fall Prevention:   room organization consistent   safety round/check completed   nonskid shoes/slippers when out of bed   fall prevention program maintained   clutter free environment maintained   assistive device/personal items within reach  Taken 12/30/2024 2000 by Bon Flaherty RN  Safety Promotion/Fall Prevention:   safety round/check completed   room organization consistent   nonskid shoes/slippers when out of bed   fall prevention program maintained   clutter free environment maintained   assistive device/personal items within reach  Intervention: Prevent Skin Injury  Recent Flowsheet Documentation  Taken 12/31/2024 0000 by Bon Flaherty RN  Body Position: weight shifting  Skin Protection: incontinence pads utilized  Taken 12/30/2024 2000 by Bon Flaherty RN  Body Position: weight shifting  Skin Protection: incontinence pads utilized  Intervention: Prevent Infection  Recent Flowsheet Documentation  Taken 12/31/2024 0400 by Bon Flaherty RN  Infection Prevention:   environmental surveillance performed   hand hygiene promoted   personal protective equipment utilized   rest/sleep promoted   single patient room provided  Taken 12/31/2024 0000 by Bon Flaherty RN  Infection Prevention: environmental surveillance performed  Taken 12/30/2024 2000 by Bon Flaherty RN  Infection Prevention:   environmental surveillance performed   hand hygiene promoted   personal protective equipment utilized   rest/sleep promoted   single patient room provided     Problem: Skin Injury Risk Increased  Goal: Skin Health and  Integrity  Intervention: Optimize Skin Protection  Recent Flowsheet Documentation  Taken 12/31/2024 0400 by Bon Flaherty RN  Pressure Reduction Techniques: frequent weight shift encouraged  Pressure Reduction Devices: pressure-redistributing mattress utilized  Taken 12/31/2024 0000 by Bon Flaherty RN  Pressure Reduction Techniques: frequent weight shift encouraged  Head of Bed (HOB) Positioning: HOB elevated  Pressure Reduction Devices: pressure-redistributing mattress utilized  Skin Protection: incontinence pads utilized  Taken 12/30/2024 2000 by Bon Flaherty RN  Pressure Reduction Techniques: frequent weight shift encouraged  Head of Bed (HOB) Positioning: HOB elevated  Pressure Reduction Devices: pressure-redistributing mattress utilized  Skin Protection: incontinence pads utilized     Problem: Fall Injury Risk  Goal: Absence of Fall and Fall-Related Injury  Intervention: Promote Injury-Free Environment  Recent Flowsheet Documentation  Taken 12/31/2024 0400 by Bon Flaherty RN  Safety Promotion/Fall Prevention:   safety round/check completed   room organization consistent   nonskid shoes/slippers when out of bed   fall prevention program maintained   clutter free environment maintained   assistive device/personal items within reach  Taken 12/31/2024 0200 by Bon Flaherty RN  Safety Promotion/Fall Prevention:   safety round/check completed   room organization consistent   nonskid shoes/slippers when out of bed   fall prevention program maintained   clutter free environment maintained   assistive device/personal items within reach  Taken 12/31/2024 0000 by Bon Flaherty RN  Safety Promotion/Fall Prevention:   safety round/check completed   room organization consistent   nonskid shoes/slippers when out of bed   fall prevention program maintained   clutter free environment maintained   assistive device/personal items within reach  Taken 12/30/2024 2200 by Bon Flaherty RN  Safety  Promotion/Fall Prevention:   room organization consistent   safety round/check completed   nonskid shoes/slippers when out of bed   fall prevention program maintained   clutter free environment maintained   assistive device/personal items within reach  Taken 12/30/2024 2000 by Bon Flaherty RN  Safety Promotion/Fall Prevention:   safety round/check completed   room organization consistent   nonskid shoes/slippers when out of bed   fall prevention program maintained   clutter free environment maintained   assistive device/personal items within reach   Goal Outcome Evaluation:        Problem: Wound  Goal: Absence of Infection Signs and Symptoms  Intervention: Prevent or Manage Infection  Recent Flowsheet Documentation  Taken 12/31/2024 0400 by Bon Flaherty RN  Isolation Precautions: precautions maintained  Taken 12/31/2024 0000 by Bon Flaherty RN  Isolation Precautions: precautions maintained  Taken 12/30/2024 2000 by Bon Flaherty RN  Isolation Precautions: precautions maintained  Goal: Skin Health and Integrity  Intervention: Optimize Skin Protection  Recent Flowsheet Documentation  Taken 12/31/2024 0400 by Bon Flaherty RN  Pressure Reduction Techniques: frequent weight shift encouraged  Pressure Reduction Devices: pressure-redistributing mattress utilized  Taken 12/31/2024 0000 by Bon Flaherty RN  Pressure Reduction Techniques: frequent weight shift encouraged  Head of Bed (HOB) Positioning: HOB elevated  Pressure Reduction Devices: pressure-redistributing mattress utilized  Taken 12/30/2024 2000 by Bon Flaherty RN  Pressure Reduction Techniques: frequent weight shift encouraged  Head of Bed (HOB) Positioning: HOB elevated  Pressure Reduction Devices: pressure-redistributing mattress utilized     Problem: Adult Inpatient Plan of Care  Goal: Absence of Hospital-Acquired Illness or Injury  Intervention: Identify and Manage Fall Risk  Recent Flowsheet Documentation  Taken 12/31/2024  0400 by Bon Flaherty RN  Safety Promotion/Fall Prevention:   safety round/check completed   room organization consistent   nonskid shoes/slippers when out of bed   fall prevention program maintained   clutter free environment maintained   assistive device/personal items within reach  Taken 12/31/2024 0200 by Bon Flaherty RN  Safety Promotion/Fall Prevention:   safety round/check completed   room organization consistent   nonskid shoes/slippers when out of bed   fall prevention program maintained   clutter free environment maintained   assistive device/personal items within reach  Taken 12/31/2024 0000 by Bon Flaherty RN  Safety Promotion/Fall Prevention:   safety round/check completed   room organization consistent   nonskid shoes/slippers when out of bed   fall prevention program maintained   clutter free environment maintained   assistive device/personal items within reach  Taken 12/30/2024 2200 by Bon Flaherty RN  Safety Promotion/Fall Prevention:   room organization consistent   safety round/check completed   nonskid shoes/slippers when out of bed   fall prevention program maintained   clutter free environment maintained   assistive device/personal items within reach  Taken 12/30/2024 2000 by Bon Flaherty RN  Safety Promotion/Fall Prevention:   safety round/check completed   room organization consistent   nonskid shoes/slippers when out of bed   fall prevention program maintained   clutter free environment maintained   assistive device/personal items within reach  Intervention: Prevent Skin Injury  Recent Flowsheet Documentation  Taken 12/31/2024 0000 by Bon Flaherty RN  Body Position: weight shifting  Skin Protection: incontinence pads utilized  Taken 12/30/2024 2000 by Bon Flaherty RN  Body Position: weight shifting  Skin Protection: incontinence pads utilized  Intervention: Prevent Infection  Recent Flowsheet Documentation  Taken 12/31/2024 0400 by Bon Flaherty  RN  Infection Prevention:   environmental surveillance performed   hand hygiene promoted   personal protective equipment utilized   rest/sleep promoted   single patient room provided  Taken 12/31/2024 0000 by Bon Flaherty RN  Infection Prevention: environmental surveillance performed  Taken 12/30/2024 2000 by Bon Flaherty RN  Infection Prevention:   environmental surveillance performed   hand hygiene promoted   personal protective equipment utilized   rest/sleep promoted   single patient room provided     Problem: Skin Injury Risk Increased  Goal: Skin Health and Integrity  Intervention: Optimize Skin Protection  Recent Flowsheet Documentation  Taken 12/31/2024 0400 by Bon Flaherty RN  Pressure Reduction Techniques: frequent weight shift encouraged  Pressure Reduction Devices: pressure-redistributing mattress utilized  Taken 12/31/2024 0000 by Bon Flaherty RN  Pressure Reduction Techniques: frequent weight shift encouraged  Head of Bed (HOB) Positioning: HOB elevated  Pressure Reduction Devices: pressure-redistributing mattress utilized  Skin Protection: incontinence pads utilized  Taken 12/30/2024 2000 by Bon Flaherty RN  Pressure Reduction Techniques: frequent weight shift encouraged  Head of Bed (HOB) Positioning: HOB elevated  Pressure Reduction Devices: pressure-redistributing mattress utilized  Skin Protection: incontinence pads utilized     Problem: Fall Injury Risk  Goal: Absence of Fall and Fall-Related Injury  Intervention: Promote Injury-Free Environment  Recent Flowsheet Documentation  Taken 12/31/2024 0400 by Bon Flaherty RN  Safety Promotion/Fall Prevention:   safety round/check completed   room organization consistent   nonskid shoes/slippers when out of bed   fall prevention program maintained   clutter free environment maintained   assistive device/personal items within reach  Taken 12/31/2024 0200 by Bon Flaherty RN  Safety Promotion/Fall Prevention:   safety  round/check completed   room organization consistent   nonskid shoes/slippers when out of bed   fall prevention program maintained   clutter free environment maintained   assistive device/personal items within reach  Taken 12/31/2024 0000 by Bon Flaherty RN  Safety Promotion/Fall Prevention:   safety round/check completed   room organization consistent   nonskid shoes/slippers when out of bed   fall prevention program maintained   clutter free environment maintained   assistive device/personal items within reach  Taken 12/30/2024 2200 by Bon Flaherty, RN  Safety Promotion/Fall Prevention:   room organization consistent   safety round/check completed   nonskid shoes/slippers when out of bed   fall prevention program maintained   clutter free environment maintained   assistive device/personal items within reach  Taken 12/30/2024 2000 by Bon Flaherty RN  Safety Promotion/Fall Prevention:   safety round/check completed   room organization consistent   nonskid shoes/slippers when out of bed   fall prevention program maintained   clutter free environment maintained   assistive device/personal items within reach   Patient has been pleasant calm and cooperative with care.

## 2024-12-31 NOTE — THERAPY TREATMENT NOTE
"Subjective: Pt agreeable to therapeutic plan of care.    Objective:     Precautions - cardiac, HFR, [place]    Bed mobility - SBA and cues for initiation  Transfers - CGA and with rolling walker  Ambulation - 15 feet Min-A and with rolling walker    Therapeutic Exercise - 10 Reps Bilaterally AROM supported sitting / chair    Vitals: Tachycardic  HR up to 120 with activity    Pain: Pt reports tailbone pain but unable to classify  Intervention for pain: Repositioned and Increased Activity    Education: Provided education on the importance of mobility in the acute care setting, Verbal/Tactile Cues, Transfer Training, and Gait Training    Assessment: Royal Beckman presents with functional mobility impairments which indicate the need for skilled intervention. Pt was pleasant and cooperative and very [place]. Pt required 100% cueing for task segmentation when up on his feet with some impulsivity noted. Pt with HR up to 120 with activity. PT assisted pt with brief change and stripped pt's bed d/t a UI incident. Tolerating session today without incident. Will continue to follow and progress as tolerated.     Plan/Recommendations:   If medically appropriate, Low Intensity Therapy recommended post-acute care - This is recommended as therapy feels this patient would require 2-3 visits per week. OP or HH would be the best option depending on patient's home bound status. Consider, if the patient has other  \"skilled\" needs such as wounds, IV antibiotics, etc. Combined with \"low intensity\" could also equate to a SNF. If patient is medically complex, consider LTAC. Pt requires no DME at discharge.     Pt desires Home with family assist and Home Health at discharge. Pt cooperative; agreeable to therapeutic recommendations and plan of care.         Basic Mobility 6-click:  Rollin = Total, A lot = 2, A little = 3; 4 = None  Supine>Sit:   1 = Total, A lot = 2, A little = 3; 4 = None   Sit>Stand with arms:  1 = Total, A lot = 2, A " little = 3; 4 = None  Bed>Chair:   1 = Total, A lot = 2, A little = 3; 4 = None  Ambulate in room:  1 = Total, A lot = 2, A little = 3; 4 = None  3-5 Steps with railin = Total, A lot = 2, A little = 3; 4 = None  Score: 19    Modified Jon: N/A = No pre-op stroke/TIA    Post-Tx Position: Up in Chair, Alarms activated, and Call light and personal items within reach  PPE: gloves    Therapy Charges for Today       Code Description Service Date Service Provider Modifiers Qty    02669509457 HC GAIT TRAINING EA 15 MIN 2024 Marion Vivar, PT GP 1    [id number] HC PT THER PROC EA 15 MIN 2024 Marion Vivar, PT GP 1           PT Charges       Row Name 24 1046             Time Calculation    Start Time 0900  -BR      Stop Time 0920  -BR      Time Calculation (min) 20 min  -BR      PT Received On 24  -BR      PT - Next Appointment 25  -BR         Time Calculation- PT    Total Timed Code Minutes- PT 20 minute(s)  -BR                User Key  (r) = Recorded By, (t) = Taken By, (c) = Cosigned By      Initials Name Provider Type    BR Marion Vivar PT Physical Therapist

## 2024-12-31 NOTE — DISCHARGE SUMMARY
[facility] Medicine Services  Discharge Summary    Date of Service: 2024  Patient Name: Royal Beckman  : 1935  MRN: [id number]    Date of Admission: 2024  Discharge Diagnosis:   Symptomatic bradycardia  Generalized weakness secondary to bradycardia  Dementia  Hypertension    Date of Discharge: 2024  Primary Care Physician: Edin Mckeon APRN      Presenting Problem:   Symptomatic bradycardia [R00.1]    Active and Resolved Hospital Problems:  Active Hospital Problems    Diagnosis POA    **Symptomatic bradycardia [R00.1] Yes      Resolved Hospital Problems   No resolved problems to display.         Hospital Course     HPI:    Patient is a 89-year-old male who presented to the hospital with complaints of generalized weakness and bradycardia.  Please see H&P for details.    Hospital Course:  Patient was found to be profoundly bradycardic on admission.  He was on atenolol and this was discontinued although it is unclear whether the patient had actually been taking it prior to admission, as it appears he may have stopped it a couple of days prior to admission.  Regardless, the patient's heart rate remained in the 40s to 50s range.  He did have some improvement in the low 60s on occasion although it did not improve significantly with activity.  Despite this, the patient no longer had any symptoms and his weakness had improved.  PT/OT recommended home health therapy.  Of note, he did have an episode where he had temporary unresponsiveness.  There was concern for possible seizure activity.  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  EEG did not show any evidence of epileptiform discharges.  Neurology did not feel that this was a true seizure event.  It may have been related to his bradycardia.  Cardiology did evaluate the patient and did discuss possible PPM placement, however the patient is being discharged home with home hospice and therefore PPM would  not be appropriate in this patient.  He is stable for discharge.  He will remain off his beta-blocker on discharge.        DISCHARGE Follow Up Recommendations for labs and diagnostics: Follow-up with your PCP as needed        Day of Discharge     Vital Signs:  Temp:  [97.3 °F (36.3 °C)-98.3 °F (36.8 °C)] 98.3 °F (36.8 °C)  Heart Rate:  [47-69] 69  Resp:  [13-21] 21  BP: ()/(54-65) 101/60    Physical Exam:  Physical Exam   General Appearance:  Alert, cooperative, no distress, appears stated age  Head:  Normocephalic, without obvious abnormality, atraumatic  Eyes:  PERRL, conjunctiva/corneas clear, EOM's intact, fundi benign, both eyes  Ears:  Normal TM's and external ear canals, both ears  Nose: Nares normal, septum midline, mucosa normal, no drainage or sinus tenderness  Throat: Lips, mucosa, and tongue normal; teeth and gums normal  Neck: Supple, symmetrical, trachea midline, no adenopathy, thyroid: not enlarged, symmetric, no tenderness/mass/nodules, no carotid bruit or JVD  Lungs:   Clear to auscultation bilaterally, respirations unlabored  Heart:  Regular rate and rhythm, S1, S2 normal, no murmur, rub or gallop  Abdomen:  Soft, non-tender, bowel sounds active all four quadrants,  no masses, no organomegaly  Extremities: Extremities normal, atraumatic, no cyanosis or edema  Pulses: 2+ and symmetric  Skin: Skin color, texture, turgor normal, no rashes or lesions  Neurologic: Normal        Pertinent  and/or Most Recent Results     LAB RESULTS:      Lab 12/30/24  0401 12/29/24  0008 12/28/24  0338 12/27/24  1042   WBC 6.08 6.72 8.10 5.39   HEMOGLOBIN 13.5 13.1 13.7 13.6   HEMATOCRIT 40.1 39.8 41.4 40.6   PLATELETS 178 163 157 163   NEUTROS ABS 3.68 4.44 5.39 3.30   IMMATURE GRANS (ABS) 0.01 0.01 0.02 0.01   LYMPHS ABS 1.66 1.57 1.82 1.44   MONOS ABS 0.59 0.57 0.70 0.49   EOS ABS 0.13 0.11 0.14 0.12   MCV 98.3* 99.0* 99.3* 99.5*   PROTIME  --   --   --  14.0   APTT  --   --   --  30.6         Lab 12/30/24  0400  12/29/24  0008 12/28/24  0338 12/27/24  1042   SODIUM 142 141 142 142   POTASSIUM 4.6 4.2 3.5 3.9   CHLORIDE 109* 108* 107 106   CO2 25.2 24.7 25.4 26.7   ANION GAP 7.8 8.3 9.6 9.3   BUN 11 12 16 14   CREATININE 0.85 0.68* 0.86 0.81   EGFR 84.1 90.0 83.8 85.3   GLUCOSE 83 91 89 83   CALCIUM 9.0 8.6 9.1 9.2   MAGNESIUM 2.3 2.1 2.1 2.0   PHOSPHORUS 3.2 2.6 3.3  --    HEMOGLOBIN A1C  --   --  5.28  --    TSH  --   --   --  1.340         Lab 12/27/24  1042   TOTAL PROTEIN 5.9*   ALBUMIN 3.3*   GLOBULIN 2.6   ALT (SGPT) 8   AST (SGOT) 21   BILIRUBIN 0.8   ALK PHOS 115         Lab 12/27/24  1156 12/27/24  1042   PROBNP  --  383.0   HSTROP T 31* 28*   PROTIME  --  14.0   INR  --  1.08             Lab 12/28/24  0338 12/27/24  1150   FOLATE 3.96*  --    VITAMIN B 12 1,560*  --    ABO TYPING  --  A   RH TYPING  --  Positive   ANTIBODY SCREEN  --  Negative         Brief Urine Lab Results       None          Microbiology Results (last 10 days)       Procedure Component Value - Date/Time    COVID-19 and FLU A/B PCR, 1 HR TAT - Swab, Nasopharynx [[id number]]  (Normal) Collected: 12/27/24 1042    Lab Status: Final result Specimen: Swab from Nasopharynx Updated: 12/27/24 1107     COVID19 Not Detected     Influenza A PCR Not Detected     Influenza B PCR Not Detected    Narrative:      Fact sheet for providers: https://www.fda.gov/media/023719/download    Fact sheet for patients: https://www.fda.gov/media/877665/download    Test performed by PCR.            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 No acute abnormality of the intracranial arterial vessels. Electronically Signed: Wood Cox DO  12/28/2024 6:30 PM EST  Workstation ID: [id number]    CT Head Without Contrast    Result Date: 12/28/2024  Impression: Impression: 1.No acute intracranial finding. 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 3.Chronic changes including  involutional changes and chronic small vessel ischemic changes. Electronically Signed: Hardy Golden MD  12/28/2024 5:05 PM EST  Workstation ID: [id number]    CT Angiogram Head w AI Analysis of LVO    Result Date: 12/27/2024  Impression: Impression: 1.Atherosclerotic changes are noted involving the carotid arteries. 2.Dominant left vertebral artery. The right vertebral artery is diminutive in size and may end in a PICA. 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 4.Left cochlear implant. Electronically Signed: Rc Samano MD  12/27/2024 12:48 PM EST  Workstation ID: [id number]    CT Angiogram Neck    Result Date: 12/27/2024  Impression: Impression: 1.Atherosclerotic changes are noted involving the carotid arteries. 2.Dominant left vertebral artery. The right vertebral artery is diminutive in size and may end in a PICA. 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 4.Left cochlear implant. Electronically Signed: Rc Samano MD  12/27/2024 12:48 PM EST  Workstation ID: [id number]    CT Head Without Contrast Stroke Protocol    Result Date: 12/27/2024  Impression: Impression: 1. The study is degraded by both motion artifact and streak artifact related to left parietal implant. 2. No acute intracranial findings are seen. 3. 1 cm anterior communicating artery aneurysm appears grossly stable, corresponds to CTA head and neck findings from 7/6/2023. 4.. Advanced chronic microvascular disease. Electronically Signed: Roxanne Jones MD  12/27/2024 11:25 AM EST  Workstation ID: [id number]    XR Chest 1 View    Result Date: 12/27/2024  Impression: Impression: No acute radiographic abnormality. Electronically Signed: Dilan Partida MD  12/27/2024 11:01 AM EST  Workstation ID: [id number]     Results for orders placed during the hospital encounter of 08/12/23    Duplex Venous Lower Extremity - Left CAR    Interpretation Summary    Acute left lower extremity superficial  thrombophlebitis noted in the varicosity (below knee).    All other left sided veins appeared normal.      Results for orders placed during the hospital encounter of 08/12/23    Duplex Venous Lower Extremity - Left CAR    Interpretation Summary    Acute left lower extremity superficial thrombophlebitis noted in the varicosity (below knee).    All other left sided veins appeared normal.      Results for orders placed during the hospital encounter of 12/27/24    Adult Transthoracic Echo Complete W/ Cont if Necessary Per Protocol    Interpretation Summary    Left ventricular ejection fraction appears to be 46 - 50%.    Left ventricular wall thickness is consistent with mild concentric hypertrophy.    Left ventricular diastolic dysfunction is noted.    The right ventricular cavity is mildly dilated.    The left atrial cavity is moderately dilated.    Mild aortic valve stenosis is present.    Estimated right ventricular systolic pressure from tricuspid regurgitation is normal (<35 mmHg).      Labs Pending at Discharge:  Pending Results       None            Procedures Performed    12/30 1328 EEG      Consults:   Consults       Date and Time Order Name Status Description    12/27/2024 11:44 PM Inpatient Cardiology Consult Completed     12/27/2024 11:07 AM Inpatient Neurology Consult Stroke Completed     12/27/2024 11:07 AM Inpatient Neurology Consult Stroke Completed               Discharge Details        Discharge Medications        New Medications        Instructions Start Date   lisinopril 5 MG tablet  Commonly known as: PRINIVIL,ZESTRIL   5 mg, Oral, Every 24 Hours Scheduled   Start Date: January 1, 2025            Continue These Medications        Instructions Start Date   aspirin 81 MG tablet   Take 1 tablet by mouth Daily.      atorvastatin 20 MG tablet  Commonly known as: LIPITOR   20 mg, Oral, Daily      B-12 1000 MCG capsule   Take 1 capsule by mouth Daily.      donepezil 10 MG tablet  Commonly known as:  Aricept   10 mg, Oral, 2 Times Daily      memantine 10 MG tablet  Commonly known as: NAMENDA   10 mg, 2 Times Daily      sertraline 50 MG tablet  Commonly known as: Zoloft   50 mg, Oral, Daily      vitamin D3 125 MCG (5000 UT) capsule capsule   5,000 Units, Daily             Stop These Medications      amLODIPine 10 MG tablet  Commonly known as: NORVASC     atenolol 25 MG tablet  Commonly known as: TENORMIN              No Known Allergies      Discharge Disposition:     Hospice/Home    Diet:  Hospital:No active diet order        Discharge Activity:         CODE STATUS:  Code Status and Medical Interventions: CPR (Attempt to Resuscitate); Full Support   Ordered at: 12/28/24 1249     Level Of Support Discussed With:    Patient     Code Status (Patient has no pulse and is not breathing):    CPR (Attempt to Resuscitate)     Medical Interventions (Patient has pulse or is breathing):    Full Support         Future Appointments   Date Time Provider Department Center   4/10/2025 12:10 PM Sanjiv Bae MD [facility]   6/12/2025  2:45 PM Seipel, Joseph F, MD [facility]   6/18/2025 11:00 AM Edin Mckeon APRN [facility]       Additional Instructions for the Follow-ups that You Need to Schedule       Discharge Follow-up with PCP   As directed       Currently Documented PCP:    Edin Mckeon APRN    PCP Phone Number:    [phone]     Follow Up Details: Follow-up with your PCP as needed                Time spent on Discharge including face to face service: Greater than 30 minutes    Signature: Electronically signed by Mahendra Vázquez MD, 12/31/24, 14:17 EST.  [facility] Hospitalist Team

## 2024-12-31 NOTE — PLAN OF CARE
"Goal Outcome Evaluation:     Assessment: Royal Beckman presents with functional mobility impairments which indicate the need for skilled intervention. Pt was pleasant and cooperative and very [place]. Pt required 100% cueing for task segmentation when up on his feet with some impulsivity noted. Pt with HR up to 120 with activity. PT assisted pt with brief change and stripped pt's bed d/t a UI incident. Tolerating session today without incident. Will continue to follow and progress as tolerated.     Plan/Recommendations:   If medically appropriate, Low Intensity Therapy recommended post-acute care - This is recommended as therapy feels this patient would require 2-3 visits per week. OP or HH would be the best option depending on patient's home bound status. Consider, if the patient has other  \"skilled\" needs such as wounds, IV antibiotics, etc. Combined with \"low intensity\" could also equate to a SNF. If patient is medically complex, consider LTAC. Pt requires no DME at discharge.     Pt desires Home with family assist and Home Health at discharge. Pt cooperative; agreeable to therapeutic recommendations and plan of care.                                            "

## 2024-12-31 NOTE — PROGRESS NOTES
CARDIOLOGY PROGRESS NOTE:    Royal Beckman  89 y.o.  male  1935  [id number]      Referring Provider: Krissy Haas APRN     Reason for follow-up: Bradycardia     Patient Care Team:  Edin Mckeon APRN as PCP - General (Family Medicine)  Sanjiv Bae MD as Consulting Physician (Cardiology)  Kelly Maravilla, CEM as Ambulatory  ([facility])    Subjective  Patient seen and examined.  Labs and chart reviewed.  Patient out of bed to chair resting comfortably on room air.  Heart rate improved to 60s/70s with ambulation.      Review of Systems   Constitutional: Negative for malaise/fatigue.   Cardiovascular:  Negative for chest pain, dyspnea on exertion, leg swelling and palpitations.   Respiratory:  Negative for cough and shortness of breath.    Gastrointestinal:  Negative for abdominal pain, nausea and vomiting.   Neurological:  Negative for dizziness, focal weakness, headaches, light-headedness and numbness.   All other systems reviewed and are negative.      Allergies: Patient has no known allergies.    Scheduled Meds:aspirin, 81 mg, Oral, Daily  atorvastatin, 20 mg, Oral, Daily  donepezil, 10 mg, Oral, BID  lisinopril, 10 mg, Oral, Q24H  memantine, 10 mg, Oral, Q12H  sertraline, 50 mg, Oral, Daily  sodium chloride, 10 mL, Intravenous, Q12H  vitamin B-12, 1,000 mcg, Oral, Daily  vitamin D3, 5,000 Units, Oral, Daily      Continuous Infusions:   PRN Meds:.  acetaminophen **OR** acetaminophen **OR** acetaminophen    senna-docusate sodium **AND** polyethylene glycol **AND** bisacodyl **AND** bisacodyl    Calcium Replacement - Follow Nurse / BPA Driven Protocol    Magnesium Standard Dose Replacement - Follow Nurse / BPA Driven Protocol    nitroglycerin    Phosphorus Replacement - Follow Nurse / BPA Driven Protocol    Potassium Replacement - Follow Nurse / BPA Driven Protocol    [COMPLETED] Insert Peripheral IV **AND** sodium chloride    sodium chloride    sodium  "chloride        VITAL SIGNS  Vitals:    12/30/24 2018 12/31/24 0017 12/31/24 0435 12/31/24 0802   BP: 133/65 98/54 109/59 125/65   BP Location: Right arm Right arm Right arm    Patient Position: Lying Lying Lying    Pulse: 63 (!) 49 61 (!) 47   Resp: 16 13 14 20   Temp: 97.7 °F (36.5 °C) 97.9 °F (36.6 °C) 97.3 °F (36.3 °C) 98 °F (36.7 °C)   TempSrc: Oral Oral Oral    SpO2: 97% 97%  96%   Weight:       Height:           Flowsheet Rows      Flowsheet Row First Filed Value   Admission Height 165.1 cm (65\") Documented at 12/27/2024 1028   Admission Weight 83.7 kg (184 lb 8.4 oz) Documented at 12/27/2024 1028             TELEMETRY: Sinus rhythm    Physical Exam:  Constitutional:       Appearance: Well-developed.   Eyes:      General: No scleral icterus.     Conjunctiva/sclera: Conjunctivae normal.   HENT:      Head: Normocephalic and atraumatic.   Neck:      Vascular: No carotid bruit or JVD.   Pulmonary:      Effort: Pulmonary effort is normal.      Breath sounds: Normal breath sounds. No wheezing. No rales.   Cardiovascular:      Normal rate. Regular rhythm.   Pulses:     Intact distal pulses.   Abdominal:      General: Bowel sounds are normal.      Palpations: Abdomen is soft.   Musculoskeletal:      Cervical back: Normal range of motion and neck supple. Skin:     General: Skin is warm and dry.      Findings: No rash.   Neurological:      Mental Status: Alert.          Results Review:   I reviewed the patient's new clinical results.  Lab Results (last 24 hours)       ** No results found for the last 24 hours. **            Imaging Results (Last 24 Hours)       ** No results found for the last 24 hours. **            EKG        I personally viewed and interpreted the patient's EKG/Telemetry data:    ECHOCARDIOGRAM:  Results for orders placed during the hospital encounter of 12/27/24    Adult Transthoracic Echo Complete W/ Cont if Necessary Per Protocol    Interpretation Summary    Left ventricular ejection fraction " appears to be 46 - 50%.    Left ventricular wall thickness is consistent with mild concentric hypertrophy.    Left ventricular diastolic dysfunction is noted.    The right ventricular cavity is mildly dilated.    The left atrial cavity is moderately dilated.    Mild aortic valve stenosis is present.    Estimated right ventricular systolic pressure from tricuspid regurgitation is normal (<35 mmHg).       STRESS MYOVIEW:       CARDIAC CATHETERIZATION:  No results found for this or any previous visit.       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  Plan for [facility] hosp[facility] at discharge, if plans change will discharge with [facility] for pacemaker evaluation    I discussed the patients findings and my recommendations with patient and nurse    PIERRE Cole  12/31/24  10:14 EST

## 2025-01-01 NOTE — CASE MANAGEMENT/SOCIAL WORK
Case Management Discharge Note      Final Note: Home with [facility]    Provided Post Acute Provider List?: Yes  Post Acute Provider List: Hospice  Delivered To: Support Person  Support Person: Lizabeth-spouse    Selected Continued Care - Discharged on 12/31/2024 Admission date: 12/27/2024 - Discharge disposition: Hospice/Home        Community Resources Coordination complete.      Service Provider Services Address Phone Fax Patient Preferred    [facility] [address] [place] [address] [place] [address] [place] [address] [phone] --                      Transportation Services  Private: Car    Final Discharge Disposition Code: 50 - home with hospice

## 2025-01-03 ENCOUNTER — TELEPHONE (OUTPATIENT)
Dept: CASE MANAGEMENT | Facility: CLINIC | Age: 89
End: 2025-01-03
Payer: MEDICARE

## 2025-01-03 ENCOUNTER — PATIENT OUTREACH (OUTPATIENT)
Dept: CASE MANAGEMENT | Facility: CLINIC | Age: 89
End: 2025-01-03
Payer: MEDICARE

## 2025-01-03 NOTE — OUTREACH NOTE
AMBULATORY CASE MANAGEMENT NOTE    Names and Relationships of Patient/Support Persons: Contact: [facility] - Dalia; Relationship:  -     Care Coordination    Incoming call from Dalia at [facility], she states that patient was discharged home from hospital on 12/31/24 and they started Hospice services.  Will notify PCP and close program.      Kelly LANDIS  Ambulatory Case Management    1/3/2025, 10:32 EST

## 2025-03-20 ENCOUNTER — TELEPHONE (OUTPATIENT)
Dept: CASE MANAGEMENT | Facility: CLINIC | Age: 89
End: 2025-03-20
Payer: MEDICARE

## 2025-03-20 NOTE — TELEPHONE ENCOUNTER
DORENE contacted by  staff, states wife is in office wondering if patient is still a patient of this office.  Explained to  staff that patient is under the care of [facility], and unless PCP agreed to continue following patient, he would be under the care of the Hospice MD.  CR completed, noted patient had UC visit for possible skin infection.  Called Dalia with [facility] to notify her of the above, and possibly reinforce how they can assist with wife.  Dalia will outreach to patient's hospice nurse to discuss further.

## 2025-05-08 ENCOUNTER — TELEPHONE (OUTPATIENT)
Dept: CARDIOLOGY | Facility: CLINIC | Age: 89
End: 2025-05-08
Payer: MEDICARE

## 2025-05-08 RX ORDER — LISINOPRIL 5 MG/1
5 TABLET ORAL
Qty: 30 TABLET | Refills: 0 | OUTPATIENT
Start: 2025-05-08

## 2025-05-08 RX ORDER — LISINOPRIL 5 MG/1
5 TABLET ORAL
Qty: 30 TABLET | Refills: 0 | Status: SHIPPED | OUTPATIENT
Start: 2025-05-08

## 2025-05-08 NOTE — TELEPHONE ENCOUNTER
Incoming Refill Request      Medication requested (name and dose): LISINOPRIL 5 MG    Pharmacy where request should be sent: [facility]    Additional details provided by patient: PATIENT AT [facility] [place].      Best call back number: [phone] PHARMACY CALLED AND REQUESTED WE SEND THIS IN.     Does the patient have less than a 3 day supply:  [] Yes  [x] No    Ravinder Jacobs Rep  05/08/25, 10:55 EDT

## 2025-05-08 NOTE — TELEPHONE ENCOUNTER
Lmtc, PT WIFE CANCELLED OV on 4/10/25, PT IN HOSPICE FOR DEMENTIA, WILL CALL TO [facility] IF NEEDED.     Pt needs an appointment, or they can have the hospice doctor order medications. We can send in a 30 day supply, to give them time to figure out what they are going to do.

## 2025-05-08 NOTE — TELEPHONE ENCOUNTER
Rx Refill Note  Requested Prescriptions      No prescriptions requested or ordered in this encounter      Last office visit with prescribing clinician: 4/8/2024   Last telemedicine visit with prescribing clinician: Visit date not found   Next office visit with prescribing clinician: Visit date not found                         Would you like a call back once the refill request has been completed: [] Yes [] No    If the office needs to give you a call back, can they leave a voicemail: [] Yes [] No    Iman Sin MA  05/08/25, 10:57 EDT

## 2025-06-09 RX ORDER — ATORVASTATIN CALCIUM 20 MG/1
20 TABLET, FILM COATED ORAL DAILY
Qty: 30 TABLET | Refills: 0 | OUTPATIENT
Start: 2025-06-09

## 2025-06-09 NOTE — TELEPHONE ENCOUNTER
Rx Refill Note  Requested Prescriptions     Refused Prescriptions Disp Refills    atorvastatin (LIPITOR) 20 MG tablet [Pharmacy Med Name: ATORVASTATIN CALCIUM 20 MG TABLET] 90 tablet 2     Sig: TAKE 1 TABLET BY MOUTH DAILY      Last office visit with prescribing clinician: 4/8/2024   Last telemedicine visit with prescribing clinician: Visit date not found   Next office visit with prescribing clinician: Visit date not found                         Would you like a call back once the refill request has been completed: [] Yes [] No    If the office needs to give you a call back, can they leave a voicemail: [] Yes [] No    Lin Horner MA  06/09/25, 09:40 EDT

## 2025-06-11 DIAGNOSIS — R41.3 MEMORY LOSS: ICD-10-CM

## 2025-06-11 RX ORDER — DONEPEZIL HYDROCHLORIDE 10 MG/1
10 TABLET, FILM COATED ORAL EVERY 12 HOURS SCHEDULED
Qty: 180 TABLET | Refills: 0 | OUTPATIENT
Start: 2025-06-11

## 2025-06-16 DIAGNOSIS — R41.3 MEMORY LOSS: ICD-10-CM

## 2025-06-16 RX ORDER — ATENOLOL 25 MG/1
12.5 TABLET ORAL DAILY
Qty: 45 TABLET | Refills: 0 | OUTPATIENT
Start: 2025-06-16

## 2025-06-16 RX ORDER — AMLODIPINE BESYLATE 10 MG/1
10 TABLET ORAL DAILY
Qty: 90 TABLET | Refills: 0 | OUTPATIENT
Start: 2025-06-16

## 2025-06-16 RX ORDER — MEMANTINE HYDROCHLORIDE 10 MG/1
10 TABLET ORAL EVERY 12 HOURS SCHEDULED
Qty: 60 TABLET | Refills: 3 | OUTPATIENT
Start: 2025-06-16

## 2025-06-16 RX ORDER — DONEPEZIL HYDROCHLORIDE 10 MG/1
TABLET, FILM COATED ORAL
Qty: 180 TABLET | Refills: 0 | OUTPATIENT
Start: 2025-06-16

## 2025-06-23 ENCOUNTER — TELEPHONE (OUTPATIENT)
Dept: CARDIOLOGY | Facility: CLINIC | Age: 89
End: 2025-06-23
Payer: MEDICARE

## 2025-06-23 NOTE — TELEPHONE ENCOUNTER
LEEANNE WITH [facility]  PHONE [phone]    PATIENT UNDER HOSPICE SERVICE NOW. NEEDS TO CONFIRM WHAT BLOOD PRESSURE MEDICATION DR. SHEA HAS PATIENT ON.

## 2025-06-23 NOTE — TELEPHONE ENCOUNTER
Called Audrey back advised we only prescribe for two medications Atorvastatin and lisinopril she V/U we will call pharmacy and stop the refills

## 2025-06-25 ENCOUNTER — TELEPHONE (OUTPATIENT)
Dept: FAMILY MEDICINE CLINIC | Facility: CLINIC | Age: 89
End: 2025-06-25
Payer: MEDICARE

## 2025-06-25 NOTE — TELEPHONE ENCOUNTER
"Patient had an appt scheduled with Ernestina on 06/18/2025 and it was marked \"no show\".    Wife Eugenia called in to our office today to state that she did call our office and spoke with someone to cancel that appt but she doesn't know who she spoke with or when she called.    Patient has been in [facility] in the [facility] Care Unit and is on Hospice since mid-March.    Patient's PCP was changed on 03/16/2025 to the Memory Care and Hospice physician.     His wife just wanted to let us know that she did cancel the appt. I told her I would let you know.  "